# Patient Record
Sex: MALE | Race: WHITE | NOT HISPANIC OR LATINO | Employment: UNEMPLOYED | URBAN - METROPOLITAN AREA
[De-identification: names, ages, dates, MRNs, and addresses within clinical notes are randomized per-mention and may not be internally consistent; named-entity substitution may affect disease eponyms.]

---

## 2017-09-06 ENCOUNTER — HOSPITAL ENCOUNTER (INPATIENT)
Facility: MEDICAL CENTER | Age: 62
LOS: 5 days | DRG: 240 | End: 2017-09-11
Attending: EMERGENCY MEDICINE | Admitting: HOSPITALIST
Payer: MEDICAID

## 2017-09-06 ENCOUNTER — APPOINTMENT (OUTPATIENT)
Dept: RADIOLOGY | Facility: MEDICAL CENTER | Age: 62
DRG: 240 | End: 2017-09-06
Attending: EMERGENCY MEDICINE
Payer: MEDICAID

## 2017-09-06 ENCOUNTER — RESOLUTE PROFESSIONAL BILLING HOSPITAL PROF FEE (OUTPATIENT)
Dept: HOSPITALIST | Facility: MEDICAL CENTER | Age: 62
End: 2017-09-06
Payer: MEDICAID

## 2017-09-06 DIAGNOSIS — I96 GANGRENE OF FOOT (HCC): ICD-10-CM

## 2017-09-06 PROBLEM — L03.90 CELLULITIS: Status: ACTIVE | Noted: 2017-09-06

## 2017-09-06 LAB
ALBUMIN SERPL BCP-MCNC: 3.5 G/DL (ref 3.2–4.9)
ALBUMIN/GLOB SERPL: 0.7 G/DL
ALP SERPL-CCNC: 72 U/L (ref 30–99)
ALT SERPL-CCNC: 21 U/L (ref 2–50)
ANION GAP SERPL CALC-SCNC: 12 MMOL/L (ref 0–11.9)
AST SERPL-CCNC: 30 U/L (ref 12–45)
BASOPHILS # BLD AUTO: 0.2 % (ref 0–1.8)
BASOPHILS # BLD: 0.03 K/UL (ref 0–0.12)
BILIRUB SERPL-MCNC: 0.4 MG/DL (ref 0.1–1.5)
BUN SERPL-MCNC: 28 MG/DL (ref 8–22)
CALCIUM SERPL-MCNC: 9.9 MG/DL (ref 8.5–10.5)
CHLORIDE SERPL-SCNC: 89 MMOL/L (ref 96–112)
CO2 SERPL-SCNC: 25 MMOL/L (ref 20–33)
CREAT SERPL-MCNC: 1.04 MG/DL (ref 0.5–1.4)
CRP SERPL HS-MCNC: 22.85 MG/DL (ref 0–0.75)
EOSINOPHIL # BLD AUTO: 0.01 K/UL (ref 0–0.51)
EOSINOPHIL NFR BLD: 0.1 % (ref 0–6.9)
ERYTHROCYTE [DISTWIDTH] IN BLOOD BY AUTOMATED COUNT: 36.1 FL (ref 35.9–50)
ERYTHROCYTE [SEDIMENTATION RATE] IN BLOOD BY WESTERGREN METHOD: 36 MM/HOUR (ref 0–20)
GFR SERPL CREATININE-BSD FRML MDRD: >60 ML/MIN/1.73 M 2
GLOBULIN SER CALC-MCNC: 4.7 G/DL (ref 1.9–3.5)
GLUCOSE BLD-MCNC: 61 MG/DL (ref 65–99)
GLUCOSE BLD-MCNC: 70 MG/DL (ref 65–99)
GLUCOSE BLD-MCNC: 83 MG/DL (ref 65–99)
GLUCOSE SERPL-MCNC: 115 MG/DL (ref 65–99)
HCT VFR BLD AUTO: 34.3 % (ref 42–52)
HGB BLD-MCNC: 11.5 G/DL (ref 14–18)
IMM GRANULOCYTES # BLD AUTO: 0.14 K/UL (ref 0–0.11)
IMM GRANULOCYTES NFR BLD AUTO: 0.7 % (ref 0–0.9)
LACTATE BLD-SCNC: 1 MMOL/L (ref 0.5–2)
LACTATE BLD-SCNC: 1.3 MMOL/L (ref 0.5–2)
LYMPHOCYTES # BLD AUTO: 1.76 K/UL (ref 1–4.8)
LYMPHOCYTES NFR BLD: 9.2 % (ref 22–41)
MCH RBC QN AUTO: 26.8 PG (ref 27–33)
MCHC RBC AUTO-ENTMCNC: 33.5 G/DL (ref 33.7–35.3)
MCV RBC AUTO: 80 FL (ref 81.4–97.8)
MONOCYTES # BLD AUTO: 1.4 K/UL (ref 0–0.85)
MONOCYTES NFR BLD AUTO: 7.4 % (ref 0–13.4)
NEUTROPHILS # BLD AUTO: 15.69 K/UL (ref 1.82–7.42)
NEUTROPHILS NFR BLD: 82.4 % (ref 44–72)
NRBC # BLD AUTO: 0 K/UL
NRBC BLD AUTO-RTO: 0 /100 WBC
PLATELET # BLD AUTO: 378 K/UL (ref 164–446)
PMV BLD AUTO: 9.1 FL (ref 9–12.9)
POTASSIUM SERPL-SCNC: 4.3 MMOL/L (ref 3.6–5.5)
PREALB SERPL-MCNC: 9 MG/DL (ref 18–38)
PROT SERPL-MCNC: 8.2 G/DL (ref 6–8.2)
RBC # BLD AUTO: 4.29 M/UL (ref 4.7–6.1)
SODIUM SERPL-SCNC: 126 MMOL/L (ref 135–145)
SODIUM SERPL-SCNC: 128 MMOL/L (ref 135–145)
WBC # BLD AUTO: 19 K/UL (ref 4.8–10.8)

## 2017-09-06 PROCEDURE — 96365 THER/PROPH/DIAG IV INF INIT: CPT

## 2017-09-06 PROCEDURE — 700102 HCHG RX REV CODE 250 W/ 637 OVERRIDE(OP): Performed by: HOSPITALIST

## 2017-09-06 PROCEDURE — 770001 HCHG ROOM/CARE - MED/SURG/GYN PRIV*

## 2017-09-06 PROCEDURE — 700105 HCHG RX REV CODE 258: Performed by: EMERGENCY MEDICINE

## 2017-09-06 PROCEDURE — 700105 HCHG RX REV CODE 258

## 2017-09-06 PROCEDURE — 96366 THER/PROPH/DIAG IV INF ADDON: CPT

## 2017-09-06 PROCEDURE — 700111 HCHG RX REV CODE 636 W/ 250 OVERRIDE (IP): Performed by: HOSPITALIST

## 2017-09-06 PROCEDURE — 80053 COMPREHEN METABOLIC PANEL: CPT

## 2017-09-06 PROCEDURE — 700111 HCHG RX REV CODE 636 W/ 250 OVERRIDE (IP): Performed by: EMERGENCY MEDICINE

## 2017-09-06 PROCEDURE — 96361 HYDRATE IV INFUSION ADD-ON: CPT

## 2017-09-06 PROCEDURE — 83605 ASSAY OF LACTIC ACID: CPT

## 2017-09-06 PROCEDURE — 36415 COLL VENOUS BLD VENIPUNCTURE: CPT

## 2017-09-06 PROCEDURE — 96375 TX/PRO/DX INJ NEW DRUG ADDON: CPT

## 2017-09-06 PROCEDURE — 96368 THER/DIAG CONCURRENT INF: CPT

## 2017-09-06 PROCEDURE — 84295 ASSAY OF SERUM SODIUM: CPT

## 2017-09-06 PROCEDURE — 71010 DX-CHEST-PORTABLE (1 VIEW): CPT

## 2017-09-06 PROCEDURE — 99223 1ST HOSP IP/OBS HIGH 75: CPT | Performed by: HOSPITALIST

## 2017-09-06 PROCEDURE — 86140 C-REACTIVE PROTEIN: CPT

## 2017-09-06 PROCEDURE — 73630 X-RAY EXAM OF FOOT: CPT | Mod: LT

## 2017-09-06 PROCEDURE — 85025 COMPLETE CBC W/AUTO DIFF WBC: CPT

## 2017-09-06 PROCEDURE — 93926 LOWER EXTREMITY STUDY: CPT

## 2017-09-06 PROCEDURE — 83036 HEMOGLOBIN GLYCOSYLATED A1C: CPT

## 2017-09-06 PROCEDURE — 700111 HCHG RX REV CODE 636 W/ 250 OVERRIDE (IP)

## 2017-09-06 PROCEDURE — 93922 UPR/L XTREMITY ART 2 LEVELS: CPT

## 2017-09-06 PROCEDURE — 87040 BLOOD CULTURE FOR BACTERIA: CPT | Mod: 91

## 2017-09-06 PROCEDURE — 99285 EMERGENCY DEPT VISIT HI MDM: CPT

## 2017-09-06 PROCEDURE — 94760 N-INVAS EAR/PLS OXIMETRY 1: CPT

## 2017-09-06 PROCEDURE — 82962 GLUCOSE BLOOD TEST: CPT

## 2017-09-06 PROCEDURE — 85652 RBC SED RATE AUTOMATED: CPT

## 2017-09-06 PROCEDURE — 93926 LOWER EXTREMITY STUDY: CPT | Mod: 26 | Performed by: SURGERY

## 2017-09-06 PROCEDURE — 84134 ASSAY OF PREALBUMIN: CPT

## 2017-09-06 PROCEDURE — 700105 HCHG RX REV CODE 258: Performed by: HOSPITALIST

## 2017-09-06 PROCEDURE — A9270 NON-COVERED ITEM OR SERVICE: HCPCS | Performed by: HOSPITALIST

## 2017-09-06 PROCEDURE — 93922 UPR/L XTREMITY ART 2 LEVELS: CPT | Mod: 26 | Performed by: SURGERY

## 2017-09-06 RX ORDER — LOSARTAN POTASSIUM 50 MG/1
50 TABLET ORAL DAILY
Status: DISCONTINUED | OUTPATIENT
Start: 2017-09-07 | End: 2017-09-11 | Stop reason: HOSPADM

## 2017-09-06 RX ORDER — ACETAMINOPHEN 325 MG/1
650 TABLET ORAL EVERY 6 HOURS PRN
Status: DISCONTINUED | OUTPATIENT
Start: 2017-09-06 | End: 2017-09-11 | Stop reason: HOSPADM

## 2017-09-06 RX ORDER — LABETALOL HYDROCHLORIDE 5 MG/ML
10 INJECTION, SOLUTION INTRAVENOUS EVERY 4 HOURS PRN
Status: DISCONTINUED | OUTPATIENT
Start: 2017-09-06 | End: 2017-09-11 | Stop reason: HOSPADM

## 2017-09-06 RX ORDER — SPIRONOLACTONE 50 MG/1
50 TABLET, FILM COATED ORAL 2 TIMES DAILY
Status: DISCONTINUED | OUTPATIENT
Start: 2017-09-06 | End: 2017-09-11 | Stop reason: HOSPADM

## 2017-09-06 RX ORDER — OXYCODONE HYDROCHLORIDE 5 MG/1
15 TABLET ORAL EVERY 4 HOURS PRN
Status: DISCONTINUED | OUTPATIENT
Start: 2017-09-06 | End: 2017-09-11 | Stop reason: HOSPADM

## 2017-09-06 RX ORDER — PROMETHAZINE HYDROCHLORIDE 25 MG/1
12.5-25 SUPPOSITORY RECTAL EVERY 4 HOURS PRN
Status: DISCONTINUED | OUTPATIENT
Start: 2017-09-06 | End: 2017-09-11 | Stop reason: HOSPADM

## 2017-09-06 RX ORDER — LOSARTAN POTASSIUM 50 MG/1
50 TABLET ORAL DAILY
COMMUNITY

## 2017-09-06 RX ORDER — HYDRALAZINE HYDROCHLORIDE 50 MG/1
100 TABLET, FILM COATED ORAL 2 TIMES DAILY
Status: DISCONTINUED | OUTPATIENT
Start: 2017-09-06 | End: 2017-09-11 | Stop reason: HOSPADM

## 2017-09-06 RX ORDER — ONDANSETRON 4 MG/1
4 TABLET, ORALLY DISINTEGRATING ORAL EVERY 4 HOURS PRN
Status: DISCONTINUED | OUTPATIENT
Start: 2017-09-06 | End: 2017-09-11 | Stop reason: HOSPADM

## 2017-09-06 RX ORDER — AMPICILLIN AND SULBACTAM 2; 1 G/1; G/1
3 INJECTION, POWDER, FOR SOLUTION INTRAMUSCULAR; INTRAVENOUS ONCE
Status: COMPLETED | OUTPATIENT
Start: 2017-09-06 | End: 2017-09-06

## 2017-09-06 RX ORDER — OMEPRAZOLE 20 MG/1
20 CAPSULE, DELAYED RELEASE ORAL
Status: DISCONTINUED | OUTPATIENT
Start: 2017-09-06 | End: 2017-09-11 | Stop reason: HOSPADM

## 2017-09-06 RX ORDER — PROMETHAZINE HYDROCHLORIDE 25 MG/1
12.5-25 TABLET ORAL EVERY 4 HOURS PRN
Status: DISCONTINUED | OUTPATIENT
Start: 2017-09-06 | End: 2017-09-11 | Stop reason: HOSPADM

## 2017-09-06 RX ORDER — SODIUM CHLORIDE 9 MG/ML
INJECTION, SOLUTION INTRAVENOUS CONTINUOUS
Status: DISCONTINUED | OUTPATIENT
Start: 2017-09-06 | End: 2017-09-10

## 2017-09-06 RX ORDER — MORPHINE SULFATE 4 MG/ML
4 INJECTION, SOLUTION INTRAMUSCULAR; INTRAVENOUS ONCE
Status: COMPLETED | OUTPATIENT
Start: 2017-09-06 | End: 2017-09-06

## 2017-09-06 RX ORDER — ISOSORBIDE MONONITRATE 30 MG/1
30 TABLET, EXTENDED RELEASE ORAL 2 TIMES DAILY
COMMUNITY

## 2017-09-06 RX ORDER — ATENOLOL 100 MG/1
100 TABLET ORAL DAILY
Status: DISCONTINUED | OUTPATIENT
Start: 2017-09-06 | End: 2017-09-11 | Stop reason: HOSPADM

## 2017-09-06 RX ORDER — BISACODYL 10 MG
10 SUPPOSITORY, RECTAL RECTAL
Status: DISCONTINUED | OUTPATIENT
Start: 2017-09-06 | End: 2017-09-11 | Stop reason: HOSPADM

## 2017-09-06 RX ORDER — OXYCODONE HYDROCHLORIDE 5 MG/1
2.5 TABLET ORAL
Status: DISCONTINUED | OUTPATIENT
Start: 2017-09-06 | End: 2017-09-06

## 2017-09-06 RX ORDER — POLYETHYLENE GLYCOL 3350 17 G/17G
1 POWDER, FOR SOLUTION ORAL
Status: DISCONTINUED | OUTPATIENT
Start: 2017-09-06 | End: 2017-09-11 | Stop reason: HOSPADM

## 2017-09-06 RX ORDER — AMOXICILLIN 250 MG
2 CAPSULE ORAL 2 TIMES DAILY
Status: DISCONTINUED | OUTPATIENT
Start: 2017-09-06 | End: 2017-09-11 | Stop reason: HOSPADM

## 2017-09-06 RX ORDER — OXYCODONE HYDROCHLORIDE 5 MG/1
5 TABLET ORAL
Status: DISCONTINUED | OUTPATIENT
Start: 2017-09-06 | End: 2017-09-06

## 2017-09-06 RX ORDER — LOVASTATIN 20 MG/1
50 TABLET ORAL NIGHTLY
Status: DISCONTINUED | OUTPATIENT
Start: 2017-09-06 | End: 2017-09-11 | Stop reason: HOSPADM

## 2017-09-06 RX ORDER — SODIUM CHLORIDE 9 MG/ML
1000 INJECTION, SOLUTION INTRAVENOUS ONCE
Status: COMPLETED | OUTPATIENT
Start: 2017-09-06 | End: 2017-09-06

## 2017-09-06 RX ORDER — DULOXETIN HYDROCHLORIDE 30 MG/1
30 CAPSULE, DELAYED RELEASE ORAL DAILY
Status: ON HOLD | COMMUNITY
End: 2020-04-09

## 2017-09-06 RX ORDER — ISOSORBIDE MONONITRATE 30 MG/1
30 TABLET, EXTENDED RELEASE ORAL 2 TIMES DAILY
Status: DISCONTINUED | OUTPATIENT
Start: 2017-09-06 | End: 2017-09-11 | Stop reason: HOSPADM

## 2017-09-06 RX ORDER — OMEPRAZOLE 20 MG/1
20 TABLET, DELAYED RELEASE ORAL DAILY
Status: DISCONTINUED | OUTPATIENT
Start: 2017-09-06 | End: 2017-09-06

## 2017-09-06 RX ORDER — OXYCODONE HYDROCHLORIDE 15 MG/1
15 TABLET ORAL EVERY 4 HOURS PRN
COMMUNITY

## 2017-09-06 RX ORDER — ONDANSETRON 2 MG/ML
4 INJECTION INTRAMUSCULAR; INTRAVENOUS EVERY 4 HOURS PRN
Status: DISCONTINUED | OUTPATIENT
Start: 2017-09-06 | End: 2017-09-11 | Stop reason: HOSPADM

## 2017-09-06 RX ORDER — SODIUM CHLORIDE 9 MG/ML
500 INJECTION, SOLUTION INTRAVENOUS
Status: DISCONTINUED | OUTPATIENT
Start: 2017-09-06 | End: 2017-09-11 | Stop reason: HOSPADM

## 2017-09-06 RX ORDER — DEXTROSE MONOHYDRATE 25 G/50ML
25 INJECTION, SOLUTION INTRAVENOUS
Status: DISCONTINUED | OUTPATIENT
Start: 2017-09-06 | End: 2017-09-11 | Stop reason: HOSPADM

## 2017-09-06 RX ORDER — AMLODIPINE BESYLATE 10 MG/1
10 TABLET ORAL DAILY
Status: DISCONTINUED | OUTPATIENT
Start: 2017-09-06 | End: 2017-09-11 | Stop reason: HOSPADM

## 2017-09-06 RX ORDER — DULOXETIN HYDROCHLORIDE 30 MG/1
30 CAPSULE, DELAYED RELEASE ORAL DAILY
Status: DISCONTINUED | OUTPATIENT
Start: 2017-09-06 | End: 2017-09-11 | Stop reason: HOSPADM

## 2017-09-06 RX ADMIN — LOVASTATIN 50 MG: 20 TABLET ORAL at 21:11

## 2017-09-06 RX ADMIN — SODIUM CHLORIDE: 9 INJECTION, SOLUTION INTRAVENOUS at 18:29

## 2017-09-06 RX ADMIN — ISOSORBIDE MONONITRATE 30 MG: 30 TABLET, EXTENDED RELEASE ORAL at 21:15

## 2017-09-06 RX ADMIN — MORPHINE SULFATE 4 MG: 4 INJECTION INTRAVENOUS at 14:03

## 2017-09-06 RX ADMIN — OMEPRAZOLE 20 MG: 20 CAPSULE, DELAYED RELEASE ORAL at 21:12

## 2017-09-06 RX ADMIN — STANDARDIZED SENNA CONCENTRATE AND DOCUSATE SODIUM 2 TABLET: 8.6; 5 TABLET, FILM COATED ORAL at 21:11

## 2017-09-06 RX ADMIN — Medication 16 G: at 18:27

## 2017-09-06 RX ADMIN — SPIRONOLACTONE 50 MG: 50 TABLET ORAL at 21:14

## 2017-09-06 RX ADMIN — SODIUM CHLORIDE 1000 ML: 9 INJECTION, SOLUTION INTRAVENOUS at 13:02

## 2017-09-06 RX ADMIN — OXYCODONE HYDROCHLORIDE 15 MG: 5 TABLET ORAL at 15:11

## 2017-09-06 RX ADMIN — HYDRALAZINE HYDROCHLORIDE 100 MG: 50 TABLET, FILM COATED ORAL at 21:11

## 2017-09-06 RX ADMIN — AMPICILLIN SODIUM AND SULBACTAM SODIUM 3 G: 2; 1 INJECTION, POWDER, FOR SOLUTION INTRAMUSCULAR; INTRAVENOUS at 19:36

## 2017-09-06 RX ADMIN — AMPICILLIN SODIUM AND SULBACTAM SODIUM 3 G: 2; 1 INJECTION, POWDER, FOR SOLUTION INTRAMUSCULAR; INTRAVENOUS at 11:13

## 2017-09-06 RX ADMIN — VANCOMYCIN HYDROCHLORIDE 2500 MG: 100 INJECTION, POWDER, LYOPHILIZED, FOR SOLUTION INTRAVENOUS at 11:28

## 2017-09-06 RX ADMIN — VANCOMYCIN HYDROCHLORIDE 1500 MG: 100 INJECTION, POWDER, LYOPHILIZED, FOR SOLUTION INTRAVENOUS at 21:16

## 2017-09-06 RX ADMIN — OXYCODONE HYDROCHLORIDE 15 MG: 5 TABLET ORAL at 19:45

## 2017-09-06 ASSESSMENT — ENCOUNTER SYMPTOMS
SHORTNESS OF BREATH: 0
VOMITING: 0
CHILLS: 0
NAUSEA: 0
DIARRHEA: 0
FEVER: 0
COUGH: 0
DIZZINESS: 0
SENSORY CHANGE: 0
CONSTIPATION: 0

## 2017-09-06 ASSESSMENT — PATIENT HEALTH QUESTIONNAIRE - PHQ9
SUM OF ALL RESPONSES TO PHQ9 QUESTIONS 1 AND 2: 1
4. FEELING TIRED OR HAVING LITTLE ENERGY: SEVERAL DAYS
SUM OF ALL RESPONSES TO PHQ QUESTIONS 1-9: 3
9. THOUGHTS THAT YOU WOULD BE BETTER OFF DEAD, OR OF HURTING YOURSELF: NOT AT ALL
3. TROUBLE FALLING OR STAYING ASLEEP OR SLEEPING TOO MUCH: NOT AT ALL
5. POOR APPETITE OR OVEREATING: NOT AT ALL
8. MOVING OR SPEAKING SO SLOWLY THAT OTHER PEOPLE COULD HAVE NOTICED. OR THE OPPOSITE, BEING SO FIGETY OR RESTLESS THAT YOU HAVE BEEN MOVING AROUND A LOT MORE THAN USUAL: SEVERAL DAYS
2. FEELING DOWN, DEPRESSED, IRRITABLE, OR HOPELESS: NOT AT ALL
6. FEELING BAD ABOUT YOURSELF - OR THAT YOU ARE A FAILURE OR HAVE LET YOURSELF OR YOUR FAMILY DOWN: NOT AL ALL
1. LITTLE INTEREST OR PLEASURE IN DOING THINGS: SEVERAL DAYS
7. TROUBLE CONCENTRATING ON THINGS, SUCH AS READING THE NEWSPAPER OR WATCHING TELEVISION: NOT AT ALL

## 2017-09-06 ASSESSMENT — LIFESTYLE VARIABLES
EVER_SMOKED: NEVER
ALCOHOL_USE: NO
DO YOU DRINK ALCOHOL: NO

## 2017-09-06 ASSESSMENT — PAIN SCALES - GENERAL
PAINLEVEL_OUTOF10: 9
PAINLEVEL_OUTOF10: 0

## 2017-09-06 NOTE — ED PROVIDER NOTES
ED Provider Note    CHIEF COMPLAINT  Chief Complaint   Patient presents with   • Foot Swelling     Possible foot infection       HPI  Seven Pavan is a 62 y.o. male who presents to the emergency department with foot swelling and an ulcer. 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 His foot has gotten worse. It is now red and swollen. He has neuropathy and denies having pain. He has had no odor. According to outside physician and the redness and swelling is much worse today. Nothing in particular brought this on. Denies fevers or chills.    REVIEW OF SYSTEMS  As per HPI, otherwise a 10 point review of systems is negative    PAST MEDICAL HISTORY  Past Medical History:   Diagnosis Date   • CVA (cerebral infarction) 2011   • Diabetes (CMS-HCC)    • Dysphagia    • HTN (hypertension)    • Obesity        SOCIAL HISTORY  Social History   Substance Use Topics   • Smoking status: Never Smoker   • Smokeless tobacco: Not on file   • Alcohol use No       SURGICAL HISTORY  Past Surgical History:   Procedure Laterality Date   • VITRECTOMY POSTERIOR Left 1/5/2016    Procedure: VITRECTOMY POSTERIOR membrane dissection laser ;  Surgeon: Parmjit Newell M.D.;  Location: SURGERY SAME DAY [facility];  Service:        CURRENT MEDICATIONS  Home Medications     Reviewed by Tyrell Barlow R.N. (Registered Nurse) on 09/06/17 at 1058  Med List Status: Not Addressed   Medication Last Dose Status   amlodipine (NORVASC) 5 MG Tab 1/4/2016 Active   atenolol (TENORMIN) 100 MG Tab 1/4/2016 Active   Dextromethorphan-Quinidine (NUEDEXTA PO) 1/4/2016 Active   dipyridamole-aspirin (AGGRENOX)  MG CAPSULE SR 12 HR 1/4/2016 Active   furosemide (LASIX) 40 MG Tab 1/4/2016 Active   glipiZIDE (GLUCOTROL) 5 MG Tab 1/4/2016 Active   hydrALAZINE (APRESOLINE) 50 MG Tab 1/4/2016 Active   isosorbide dinitrate (ISORDIL) 30 MG Tab 1/4/2016 Active   lisinopril (PRINIVIL, ZESTRIL) 40 MG tablet  "1/4/2016 Active   lovastatin (MEVACOR) 40 MG tablet 1/4/2016 Active   metformin (GLUCOPHAGE) 1000 MG tablet 1/4/2016 Active   omeprazole (PRILOSEC OTC) 20 MG tablet 1/4/2016 Active   spironolactone (ALDACTONE) 50 MG Tab 1/4/2016 Active                ALLERGIES  No Known Allergies    PHYSICAL EXAM  VITAL SIGNS: /72   Pulse 69   Temp 36.2 °C (97.2 °F)   Resp 18   Ht 1.702 m (5' 7\")   Wt 99.8 kg (220 lb)   BMI 34.46 kg/m²    Constitutional: Awake and alert  HENT:  Atraumatic, Normocephalic.Oropharynx moist mucus membranes, Nose normal inspection.   Eyes: Normal inspection  Neck: Supple  Cardiovascular: Normal heart rate, Normal rhythm.    Thorax & Lungs: No respiratory distress, No wheezing, No rales, No rhonchi, No chest tenderness.   Abdomen: Bowel sounds normal, soft, non-distended, nontender, no mass  Skin: See extremity exam below  Extremities: Obvious gangrene of the left 1st and 4th toes. There is redness and swelling over the dorsal foot. Chronic vascular skin changes bilateral lower extremities. I cannot palpate dorsalis pedis or posterior tibial pulse.  Neurologic: Dysarthria, left hemiparesis from prior stroke    RADIOLOGY/PROCEDURES  LE ART DUPLX/IMAG (Specify in Comments Left, Right Or Bilateral)   Final Result      LE ART/CORDELIA (Specify in Comments Left, Right Or Bilateral)   Final Result      DX-FOOT-COMPLETE 3+ LEFT   Final Result      1.  There is diffuse swelling with gas in the plantar soft tissues in the distal metatarsal regions. There is no periostitis to suggest osteomyelitis.   2.  There is extensive atherosclerotic change throughout the vessels of the foot and ankle.   3.  There is degenerative change as described above.      DX-CHEST-PORTABLE (1 VIEW)   Final Result      1.  There is mild peribronchial wall thickening suggesting possible vascular congestion or edema.   2.  Right hilar opacity is likely due to atelectasis.       Imaging is interpreted by radiologist    Labs:  Results " for orders placed or performed during the hospital encounter of 09/06/17   LACTIC ACID   Result Value Ref Range    Lactic Acid 1.3 0.5 - 2.0 mmol/L   CBC WITH DIFFERENTIAL   Result Value Ref Range    WBC 19.0 (H) 4.8 - 10.8 K/uL    RBC 4.29 (L) 4.70 - 6.10 M/uL    Hemoglobin 11.5 (L) 14.0 - 18.0 g/dL    Hematocrit 34.3 (L) 42.0 - 52.0 %    MCV 80.0 (L) 81.4 - 97.8 fL    MCH 26.8 (L) 27.0 - 33.0 pg    MCHC 33.5 (L) 33.7 - 35.3 g/dL    RDW 36.1 35.9 - 50.0 fL    Platelet Count 378 164 - 446 K/uL    MPV 9.1 9.0 - 12.9 fL    Neutrophils-Polys 82.40 (H) 44.00 - 72.00 %    Lymphocytes 9.20 (L) 22.00 - 41.00 %    Monocytes 7.40 0.00 - 13.40 %    Eosinophils 0.10 0.00 - 6.90 %    Basophils 0.20 0.00 - 1.80 %    Immature Granulocytes 0.70 0.00 - 0.90 %    Nucleated RBC 0.00 /100 WBC    Neutrophils (Absolute) 15.69 (H) 1.82 - 7.42 K/uL    Lymphs (Absolute) 1.76 1.00 - 4.80 K/uL    Monos (Absolute) 1.40 (H) 0.00 - 0.85 K/uL    Eos (Absolute) 0.01 0.00 - 0.51 K/uL    Baso (Absolute) 0.03 0.00 - 0.12 K/uL    Immature Granulocytes (abs) 0.14 (H) 0.00 - 0.11 K/uL    NRBC (Absolute) 0.00 K/uL   COMP METABOLIC PANEL   Result Value Ref Range    Sodium 126 (L) 135 - 145 mmol/L    Potassium 4.3 3.6 - 5.5 mmol/L    Chloride 89 (L) 96 - 112 mmol/L    Co2 25 20 - 33 mmol/L    Anion Gap 12.0 (H) 0.0 - 11.9    Glucose 115 (H) 65 - 99 mg/dL    Bun 28 (H) 8 - 22 mg/dL    Creatinine 1.04 0.50 - 1.40 mg/dL    Calcium 9.9 8.5 - 10.5 mg/dL    AST(SGOT) 30 12 - 45 U/L    ALT(SGPT) 21 2 - 50 U/L    Alkaline Phosphatase 72 30 - 99 U/L    Total Bilirubin 0.4 0.1 - 1.5 mg/dL    Albumin 3.5 3.2 - 4.9 g/dL    Total Protein 8.2 6.0 - 8.2 g/dL    Globulin 4.7 (H) 1.9 - 3.5 g/dL    A-G Ratio 0.7 g/dL   ESTIMATED GFR   Result Value Ref Range    GFR If African American >60 >60 mL/min/1.73 m 2    GFR If Non African American >60 >60 mL/min/1.73 m 2         COURSE & MEDICAL DECISION MAKING  Patient presents with gangrenous toes with evidence of infection over  the foot. IV was established. Given IV vancomycin and Unasyn. Obtained laboratory data and x-rays with results as noted above. Started saline infusion for hyponatremia. Patient's vital signs are stable. Patient will be admitted to the hospital for further treatment. Hospitalist was consulted for admission.  consultu Dr. Whitley, orthopedics    FINAL IMPRESSION  1. Cellulitis of the foot  2. Gangrene of left toes #1 and 4      This dictation was created using voice recognition software. The accuracy of the dictation is limited to the abilities of the software.  The nursing notes were reviewed and certain aspects of this information were incorporated into this note.      Electronically signed by: Tyrell Griffin, 9/6/2017 11:10 AM

## 2017-09-06 NOTE — ED NOTES
Patient updated on POC. Complained of 6/10 pain in left foot. ERP notified and patient medicated for pain

## 2017-09-06 NOTE — ED NOTES
Patient transferred from [facility]. Patient transferred for infection in left foot and big toe. Infection has lasted approximately 3 months. ERP at bedside for eval

## 2017-09-06 NOTE — ED NOTES
Patient repositioned in bed and updated on POC. Patient o2 sats were dropping to 88-90 based off positioning in bed. Patient was placed on 2l of 02 via NC.

## 2017-09-06 NOTE — PROGRESS NOTES
LIMB PRESERVATION SERVICE      DATE OF CONSULTATION: 9/6/2017    REASON FOR CONSULT: Gangrenous toes of left foot    HISTORY OF PRESENT ILLNESS: 62-year-old male, prisoner from [facility], seen in ED for gangrenous left 4th toe and left great toe.  The wound on the 4th toe started as a blister about 3 weeks ago.  The great toe wound started after he was given a new pair of shoes recently. He is nonambulatory due to a series of CVAs.  He uses a wheelchair for mobility.  He smoked for many years prior to his incarceration.  He is diabetic, though he does not know what his blood sugars normally run.  An xray of his foot was done and shows diffuse swelling with gas in the plantar soft tissues, no osseous destruction.  Arterial studies have also been done and shows left lower extremity arterial insufficiency.  He denies fevers, chills, nausea or vomiting. WBC 19; Glucose 115.   IV vancomycin and Unasyn started on admission.    PAST MEDICAL HISTORY:   Past Medical History:   Diagnosis Date   • CVA (cerebral infarction) 2011   • Diabetes (CMS-HCC)    • Dysphagia    • HTN (hypertension)    • Obesity         MEDICATIONS:   Current Facility-Administered Medications   Medication Dose   • amlodipine (NORVASC) tablet 10 mg  10 mg   • atenolol (TENORMIN) tablet 100 mg  100 mg   • duloxetine (CYMBALTA) capsule 30 mg  30 mg   • hydrALAZINE (APRESOLINE) tablet 100 mg  100 mg   • isosorbide mononitrate SR (IMDUR) tablet 30 mg  30 mg   • [START ON 9/7/2017] losartan (COZAAR) tablet 50 mg  50 mg   • lovastatin (MEVACOR) tablet 50 mg  50 mg   • oxycodone immediate-release (ROXICODONE) tablet 15 mg  15 mg   • spironolactone (ALDACTONE) tablet 50 mg  50 mg   • senna-docusate (PERICOLACE or SENOKOT S) 8.6-50 MG per tablet 2 Tab  2 Tab    And   • polyethylene glycol/lytes (MIRALAX) PACKET 1 Packet  1 Packet    And   • magnesium hydroxide (MILK OF MAGNESIA) suspension 30 mL  30 mL    And   • bisacodyl (DULCOLAX) suppository 10 mg  10 mg   • NS  infusion     • NS (BOLUS) infusion 500 mL  500 mL   • acetaminophen (TYLENOL) tablet 650 mg  650 mg   • Pharmacy Consult Request ...Pain Management Review      And   • HYDROmorphone (DILAUDID) injection 0.25 mg  0.25 mg   • ampicillin/sulbactam (UNASYN) 3 g in  mL IVPB  3 g   • MD ALERT... vancomycin per pharmacy protocol 1 Each  1 Each   • labetalol (NORMODYNE,TRANDATE) injection 10 mg  10 mg   • insulin lispro (HUMALOG) injection 2-9 Units  2-9 Units   • glucose 4 g chewable tablet 16 g  16 g    And   • dextrose 50% (D50W) injection 25 mL  25 mL   • ondansetron (ZOFRAN) syringe/vial injection 4 mg  4 mg   • ondansetron (ZOFRAN ODT) dispertab 4 mg  4 mg   • promethazine (PHENERGAN) tablet 12.5-25 mg  12.5-25 mg   • promethazine (PHENERGAN) suppository 12.5-25 mg  12.5-25 mg   • prochlorperazine (COMPAZINE) injection 5-10 mg  5-10 mg   • omeprazole (PRILOSEC) capsule 20 mg  20 mg     Current Outpatient Prescriptions   Medication   • losartan (COZAAR) 50 MG Tab   • Dextromethorphan-Quinidine (NUEDEXTA) 20-10 MG Cap   • isosorbide mononitrate SR (IMDUR) 30 MG TABLET SR 24 HR   • duloxetine (CYMBALTA) 30 MG Cap DR Particles   • oxycodone (OXY-IR) 15 MG immediate release tablet   • furosemide (LASIX) 40 MG Tab   • glipiZIDE (GLUCOTROL) 5 MG Tab   • dipyridamole-aspirin (AGGRENOX)  MG CAPSULE SR 12 HR   • lovastatin (MEVACOR) 40 MG tablet   • omeprazole (PRILOSEC OTC) 20 MG tablet   • spironolactone (ALDACTONE) 50 MG Tab   • hydrALAZINE (APRESOLINE) 50 MG Tab   • amlodipine (NORVASC) 5 MG Tab   • atenolol (TENORMIN) 100 MG Tab   • metformin (GLUCOPHAGE) 1000 MG tablet       ALLERGIES:  No Known Allergies     PAST SURGICAL HISTORY:   Past Surgical History:   Procedure Laterality Date   • VITRECTOMY POSTERIOR Left 1/5/2016    Procedure: VITRECTOMY POSTERIOR membrane dissection laser ;  Surgeon: Parmjit Newell M.D.;  Location: SURGERY SAME DAY [facility];  Service:        SOCIAL HISTORY:   Social  "History     Social History   • Marital status: Single     Spouse name: N/A   • Number of children: N/A   • Years of education: N/A     Social History Main Topics   • Smoking status: Never Smoker   • Smokeless tobacco: Not on file   • Alcohol use No   • Drug use: No   • Sexual activity: Not on file     Other Topics Concern   • Not on file     Social History Narrative   • No narrative on file        FAMILY HISTORY: No family history on file.    REVIEW OF SYSTEMS:   Review of Systems   Constitutional: Negative for chills and fever.   Respiratory: Negative for cough and shortness of breath.    Cardiovascular: Negative for chest pain and leg swelling.   Gastrointestinal: Negative for constipation, diarrhea, nausea and vomiting.   Musculoskeletal:        Left leg painful, foot swollen for several days   Neurological: Negative for dizziness and sensory change.       PHYSICAL EXAMINATION:   VITAL SIGNS: Blood pressure 111/72, pulse (!) 58, temperature 36.2 °C (97.2 °F), resp. rate 18, height 1.702 m (5' 7\"), weight 99.8 kg (220 lb), SpO2 90 %.  Physical Exam   Constitutional: He is well-developed, well-nourished, and in no distress.   HENT:   Head: Normocephalic.   Eyes: Pupils are equal, round, and reactive to light.   Cardiovascular:   Unable to palpate Left pedal pulses  Foot cool to touch       Pulmonary/Chest: Effort normal.   Musculoskeletal: He exhibits edema.   Decreased ROM of LLE  Left foot edematous   Neurological:   Drowsy, falling asleep during conversation   Skin: Skin is warm.   Gangrenous left great toe and left 4th toe, foul odor noted  Swelling and fluctuance to left plantar foot   Psychiatric: Affect normal.         DIAGNOSTIC DATA:   Recent Labs      09/06/17   1058   WBC  19.0*   RBC  4.29*   HEMOGLOBIN  11.5*   HEMATOCRIT  34.3*   MCV  80.0*   MCH  26.8*   MCHC  33.5*   RDW  36.1   PLATELETCT  378   MPV  9.1     Recent Labs      09/06/17   1058   SODIUM  126*   POTASSIUM  4.3   CHLORIDE  89*   CO2  25 "   GLUCOSE  115*   BUN  28*                               Results for orders placed during the hospital encounter of 09/06/17   DX-FOOT-COMPLETE 3+ LEFT    Impression 1.  There is diffuse swelling with gas in the plantar soft tissues in the distal metatarsal regions. There is no periostitis to suggest osteomyelitis.  2.  There is extensive atherosclerotic change throughout the vessels of the foot and ankle.  3.  There is degenerative change as described above.                                                                     VASCULAR STUDIES  LLE arterial duplex: CONCLUSIONS   1.  Monophasic left left artery waveforms lend suspicion for possible left    iliac artery stenosis.   2.  Heavy calcification in the left superficial femoral artery with distal    occlusion.    3.  Possible left lower extremity tibial artery occlusion with distal    reconstitution of the anterior tibial and dorsal pedal artery.      ASSESSMENT AND PLAN:     1. Gangrene of toes- Gangrene of left great toe and left 4th toes d/t LLE arterial insufficiency.  Vascular to consult, Dr. Osei contacted.     2. Infection- IV abx started on admission.  WBC 19.  Afebrile.     3. Diabetes mellitus type 2- Glucose 115 on admission. A1c ordered

## 2017-09-07 PROBLEM — I10 HTN (HYPERTENSION): Status: ACTIVE | Noted: 2017-09-07

## 2017-09-07 PROBLEM — E87.1 HYPONATREMIA: Status: ACTIVE | Noted: 2017-09-07

## 2017-09-07 PROBLEM — D64.9 ANEMIA: Status: ACTIVE | Noted: 2017-09-07

## 2017-09-07 PROBLEM — L97.509 DIABETIC FOOT ULCER (HCC): Status: ACTIVE | Noted: 2017-09-07

## 2017-09-07 PROBLEM — I96 GANGRENE OF FOOT (HCC): Status: ACTIVE | Noted: 2017-09-07

## 2017-09-07 PROBLEM — I73.9 PAD (PERIPHERAL ARTERY DISEASE) (HCC): Status: ACTIVE | Noted: 2017-09-07

## 2017-09-07 PROBLEM — E78.5 DYSLIPIDEMIA: Status: ACTIVE | Noted: 2017-09-07

## 2017-09-07 PROBLEM — E11.621 DIABETIC FOOT ULCER (HCC): Status: ACTIVE | Noted: 2017-09-07

## 2017-09-07 PROBLEM — E11.9 DM (DIABETES MELLITUS) (HCC): Status: ACTIVE | Noted: 2017-09-07

## 2017-09-07 PROBLEM — Z86.73 HISTORY OF STROKE: Status: ACTIVE | Noted: 2017-09-07

## 2017-09-07 LAB
ANION GAP SERPL CALC-SCNC: 9 MMOL/L (ref 0–11.9)
BASOPHILS # BLD AUTO: 0.3 % (ref 0–1.8)
BASOPHILS # BLD: 0.04 K/UL (ref 0–0.12)
BUN SERPL-MCNC: 16 MG/DL (ref 8–22)
CALCIUM SERPL-MCNC: 8.9 MG/DL (ref 8.5–10.5)
CHLORIDE SERPL-SCNC: 95 MMOL/L (ref 96–112)
CO2 SERPL-SCNC: 24 MMOL/L (ref 20–33)
CREAT SERPL-MCNC: 0.6 MG/DL (ref 0.5–1.4)
EKG IMPRESSION: NORMAL
EOSINOPHIL # BLD AUTO: 0.04 K/UL (ref 0–0.51)
EOSINOPHIL NFR BLD: 0.3 % (ref 0–6.9)
ERYTHROCYTE [DISTWIDTH] IN BLOOD BY AUTOMATED COUNT: 36.4 FL (ref 35.9–50)
GFR SERPL CREATININE-BSD FRML MDRD: >60 ML/MIN/1.73 M 2
GLUCOSE BLD-MCNC: 108 MG/DL (ref 65–99)
GLUCOSE BLD-MCNC: 115 MG/DL (ref 65–99)
GLUCOSE BLD-MCNC: 163 MG/DL (ref 65–99)
GLUCOSE BLD-MCNC: 216 MG/DL (ref 65–99)
GLUCOSE BLD-MCNC: 226 MG/DL (ref 65–99)
GLUCOSE SERPL-MCNC: 95 MG/DL (ref 65–99)
HCT VFR BLD AUTO: 28.6 % (ref 42–52)
HGB BLD-MCNC: 9.4 G/DL (ref 14–18)
IMM GRANULOCYTES # BLD AUTO: 0.1 K/UL (ref 0–0.11)
IMM GRANULOCYTES NFR BLD AUTO: 0.7 % (ref 0–0.9)
LYMPHOCYTES # BLD AUTO: 1.43 K/UL (ref 1–4.8)
LYMPHOCYTES NFR BLD: 9.9 % (ref 22–41)
MCH RBC QN AUTO: 26.3 PG (ref 27–33)
MCHC RBC AUTO-ENTMCNC: 32.9 G/DL (ref 33.7–35.3)
MCV RBC AUTO: 80.1 FL (ref 81.4–97.8)
MONOCYTES # BLD AUTO: 1.05 K/UL (ref 0–0.85)
MONOCYTES NFR BLD AUTO: 7.3 % (ref 0–13.4)
NEUTROPHILS # BLD AUTO: 11.81 K/UL (ref 1.82–7.42)
NEUTROPHILS NFR BLD: 81.5 % (ref 44–72)
NRBC # BLD AUTO: 0 K/UL
NRBC BLD AUTO-RTO: 0 /100 WBC
PLATELET # BLD AUTO: 273 K/UL (ref 164–446)
PMV BLD AUTO: 9.2 FL (ref 9–12.9)
POTASSIUM SERPL-SCNC: 4 MMOL/L (ref 3.6–5.5)
RBC # BLD AUTO: 3.57 M/UL (ref 4.7–6.1)
SODIUM SERPL-SCNC: 128 MMOL/L (ref 135–145)
VANCOMYCIN TROUGH SERPL-MCNC: 20.3 UG/ML (ref 10–20)
WBC # BLD AUTO: 14.5 K/UL (ref 4.8–10.8)

## 2017-09-07 PROCEDURE — 82962 GLUCOSE BLOOD TEST: CPT | Mod: 91

## 2017-09-07 PROCEDURE — 700111 HCHG RX REV CODE 636 W/ 250 OVERRIDE (IP)

## 2017-09-07 PROCEDURE — 700101 HCHG RX REV CODE 250

## 2017-09-07 PROCEDURE — 700105 HCHG RX REV CODE 258

## 2017-09-07 PROCEDURE — G8997 SWALLOW GOAL STATUS: HCPCS | Mod: CI

## 2017-09-07 PROCEDURE — 160048 HCHG OR STATISTICAL LEVEL 1-5: Performed by: SURGERY

## 2017-09-07 PROCEDURE — 500088 HCHG BLADE, SAGITTAL: Performed by: SURGERY

## 2017-09-07 PROCEDURE — 501838 HCHG SUTURE GENERAL: Performed by: SURGERY

## 2017-09-07 PROCEDURE — 88307 TISSUE EXAM BY PATHOLOGIST: CPT

## 2017-09-07 PROCEDURE — 500881 HCHG PACK, EXTREMITY: Performed by: SURGERY

## 2017-09-07 PROCEDURE — 700102 HCHG RX REV CODE 250 W/ 637 OVERRIDE(OP)

## 2017-09-07 PROCEDURE — 92610 EVALUATE SWALLOWING FUNCTION: CPT

## 2017-09-07 PROCEDURE — A9270 NON-COVERED ITEM OR SERVICE: HCPCS

## 2017-09-07 PROCEDURE — 770001 HCHG ROOM/CARE - MED/SURG/GYN PRIV*

## 2017-09-07 PROCEDURE — 700105 HCHG RX REV CODE 258: Performed by: HOSPITALIST

## 2017-09-07 PROCEDURE — 160036 HCHG PACU - EA ADDL 30 MINS PHASE I: Performed by: SURGERY

## 2017-09-07 PROCEDURE — 700111 HCHG RX REV CODE 636 W/ 250 OVERRIDE (IP): Performed by: NURSE PRACTITIONER

## 2017-09-07 PROCEDURE — 160002 HCHG RECOVERY MINUTES (STAT): Performed by: SURGERY

## 2017-09-07 PROCEDURE — 500053 HCHG BANDAGE, ELASTIC 4: Performed by: SURGERY

## 2017-09-07 PROCEDURE — 700102 HCHG RX REV CODE 250 W/ 637 OVERRIDE(OP): Performed by: HOSPITALIST

## 2017-09-07 PROCEDURE — 80202 ASSAY OF VANCOMYCIN: CPT

## 2017-09-07 PROCEDURE — 160035 HCHG PACU - 1ST 60 MINS PHASE I: Performed by: SURGERY

## 2017-09-07 PROCEDURE — 36415 COLL VENOUS BLD VENIPUNCTURE: CPT

## 2017-09-07 PROCEDURE — 0Y6D0Z1 DETACHMENT AT LEFT UPPER LEG, HIGH, OPEN APPROACH: ICD-10-PCS | Performed by: SURGERY

## 2017-09-07 PROCEDURE — 80048 BASIC METABOLIC PNL TOTAL CA: CPT

## 2017-09-07 PROCEDURE — 160009 HCHG ANES TIME/MIN: Performed by: SURGERY

## 2017-09-07 PROCEDURE — 501445 HCHG STAPLER, SKIN DISP: Performed by: SURGERY

## 2017-09-07 PROCEDURE — 500697 HCHG HEMOCLIP, LARGE (ORANGE): Performed by: SURGERY

## 2017-09-07 PROCEDURE — 99233 SBSQ HOSP IP/OBS HIGH 50: CPT | Performed by: FAMILY MEDICINE

## 2017-09-07 PROCEDURE — 160028 HCHG SURGERY MINUTES - 1ST 30 MINS LEVEL 3: Performed by: SURGERY

## 2017-09-07 PROCEDURE — G8996 SWALLOW CURRENT STATUS: HCPCS | Mod: CJ

## 2017-09-07 PROCEDURE — 160039 HCHG SURGERY MINUTES - EA ADDL 1 MIN LEVEL 3: Performed by: SURGERY

## 2017-09-07 PROCEDURE — 88311 DECALCIFY TISSUE: CPT

## 2017-09-07 PROCEDURE — A9270 NON-COVERED ITEM OR SERVICE: HCPCS | Performed by: HOSPITALIST

## 2017-09-07 PROCEDURE — 93005 ELECTROCARDIOGRAM TRACING: CPT | Performed by: FAMILY MEDICINE

## 2017-09-07 PROCEDURE — 93010 ELECTROCARDIOGRAM REPORT: CPT | Performed by: INTERNAL MEDICINE

## 2017-09-07 PROCEDURE — 700111 HCHG RX REV CODE 636 W/ 250 OVERRIDE (IP): Performed by: HOSPITALIST

## 2017-09-07 PROCEDURE — 85025 COMPLETE CBC W/AUTO DIFF WBC: CPT

## 2017-09-07 RX ORDER — SODIUM CHLORIDE 9 MG/ML
INJECTION, SOLUTION INTRAVENOUS
Status: COMPLETED
Start: 2017-09-07 | End: 2017-09-07

## 2017-09-07 RX ORDER — OXYCODONE HYDROCHLORIDE AND ACETAMINOPHEN 5; 325 MG/1; MG/1
TABLET ORAL
Status: COMPLETED
Start: 2017-09-07 | End: 2017-09-07

## 2017-09-07 RX ORDER — MAGNESIUM HYDROXIDE 1200 MG/15ML
LIQUID ORAL
Status: DISCONTINUED | OUTPATIENT
Start: 2017-09-07 | End: 2017-09-07 | Stop reason: HOSPADM

## 2017-09-07 RX ADMIN — STANDARDIZED SENNA CONCENTRATE AND DOCUSATE SODIUM 2 TABLET: 8.6; 5 TABLET, FILM COATED ORAL at 21:25

## 2017-09-07 RX ADMIN — INSULIN LISPRO 3 UNITS: 100 INJECTION, SOLUTION INTRAVENOUS; SUBCUTANEOUS at 21:23

## 2017-09-07 RX ADMIN — VANCOMYCIN HYDROCHLORIDE 1500 MG: 100 INJECTION, POWDER, LYOPHILIZED, FOR SOLUTION INTRAVENOUS at 12:33

## 2017-09-07 RX ADMIN — AMPICILLIN SODIUM AND SULBACTAM SODIUM 3 G: 2; 1 INJECTION, POWDER, FOR SOLUTION INTRAMUSCULAR; INTRAVENOUS at 21:25

## 2017-09-07 RX ADMIN — RACEPINEPHRINE HYDROCHLORIDE: 11.25 SOLUTION RESPIRATORY (INHALATION) at 11:25

## 2017-09-07 RX ADMIN — HYDRALAZINE HYDROCHLORIDE 100 MG: 50 TABLET, FILM COATED ORAL at 21:25

## 2017-09-07 RX ADMIN — SPIRONOLACTONE 50 MG: 50 TABLET ORAL at 21:25

## 2017-09-07 RX ADMIN — ISOSORBIDE MONONITRATE 30 MG: 30 TABLET, EXTENDED RELEASE ORAL at 08:21

## 2017-09-07 RX ADMIN — HYDROMORPHONE HYDROCHLORIDE 0.25 MG: 1 INJECTION, SOLUTION INTRAMUSCULAR; INTRAVENOUS; SUBCUTANEOUS at 06:09

## 2017-09-07 RX ADMIN — INSULIN LISPRO 3 UNITS: 100 INJECTION, SOLUTION INTRAVENOUS; SUBCUTANEOUS at 16:49

## 2017-09-07 RX ADMIN — AMLODIPINE BESYLATE 10 MG: 10 TABLET ORAL at 08:19

## 2017-09-07 RX ADMIN — STANDARDIZED SENNA CONCENTRATE AND DOCUSATE SODIUM 2 TABLET: 8.6; 5 TABLET, FILM COATED ORAL at 08:20

## 2017-09-07 RX ADMIN — LOSARTAN POTASSIUM 50 MG: 50 TABLET, FILM COATED ORAL at 08:18

## 2017-09-07 RX ADMIN — HYDROMORPHONE HYDROCHLORIDE 0.5 MG: 1 INJECTION, SOLUTION INTRAMUSCULAR; INTRAVENOUS; SUBCUTANEOUS at 22:13

## 2017-09-07 RX ADMIN — SPIRONOLACTONE 50 MG: 50 TABLET ORAL at 08:20

## 2017-09-07 RX ADMIN — ISOSORBIDE MONONITRATE 30 MG: 30 TABLET, EXTENDED RELEASE ORAL at 21:25

## 2017-09-07 RX ADMIN — AMPICILLIN SODIUM AND SULBACTAM SODIUM 3 G: 2; 1 INJECTION, POWDER, FOR SOLUTION INTRAMUSCULAR; INTRAVENOUS at 00:29

## 2017-09-07 RX ADMIN — DULOXETINE HYDROCHLORIDE 30 MG: 30 CAPSULE, DELAYED RELEASE ORAL at 08:18

## 2017-09-07 RX ADMIN — AMPICILLIN SODIUM AND SULBACTAM SODIUM 3 G: 2; 1 INJECTION, POWDER, FOR SOLUTION INTRAMUSCULAR; INTRAVENOUS at 06:09

## 2017-09-07 RX ADMIN — HYDROMORPHONE HYDROCHLORIDE 0.25 MG: 1 INJECTION, SOLUTION INTRAMUSCULAR; INTRAVENOUS; SUBCUTANEOUS at 09:05

## 2017-09-07 RX ADMIN — HYDROMORPHONE HYDROCHLORIDE 0.25 MG: 1 INJECTION, SOLUTION INTRAMUSCULAR; INTRAVENOUS; SUBCUTANEOUS at 02:45

## 2017-09-07 RX ADMIN — OXYCODONE HYDROCHLORIDE 15 MG: 5 TABLET ORAL at 03:49

## 2017-09-07 RX ADMIN — HYDROMORPHONE HYDROCHLORIDE 0.25 MG: 1 INJECTION, SOLUTION INTRAMUSCULAR; INTRAVENOUS; SUBCUTANEOUS at 16:11

## 2017-09-07 RX ADMIN — OXYCODONE HYDROCHLORIDE 15 MG: 5 TABLET ORAL at 15:07

## 2017-09-07 RX ADMIN — SODIUM CHLORIDE: 9 INJECTION, SOLUTION INTRAVENOUS at 12:15

## 2017-09-07 RX ADMIN — OXYCODONE AND ACETAMINOPHEN 2 TABLET: 5; 325 TABLET ORAL at 12:32

## 2017-09-07 RX ADMIN — LOVASTATIN 50 MG: 20 TABLET ORAL at 21:25

## 2017-09-07 RX ADMIN — ATENOLOL 100 MG: 100 TABLET ORAL at 08:20

## 2017-09-07 RX ADMIN — HYDRALAZINE HYDROCHLORIDE 100 MG: 50 TABLET, FILM COATED ORAL at 08:19

## 2017-09-07 RX ADMIN — OXYCODONE HYDROCHLORIDE 15 MG: 5 TABLET ORAL at 20:12

## 2017-09-07 RX ADMIN — AMPICILLIN SODIUM AND SULBACTAM SODIUM 3 G: 2; 1 INJECTION, POWDER, FOR SOLUTION INTRAMUSCULAR; INTRAVENOUS at 15:04

## 2017-09-07 ASSESSMENT — ENCOUNTER SYMPTOMS
HEARTBURN: 0
FEVER: 0
NAUSEA: 0
SHORTNESS OF BREATH: 0
COUGH: 0
DIARRHEA: 0
FOCAL WEAKNESS: 1
VOMITING: 0
ABDOMINAL PAIN: 0
HEADACHES: 0
DIZZINESS: 0
CHILLS: 0
BLURRED VISION: 0
SORE THROAT: 0
WHEEZING: 0

## 2017-09-07 ASSESSMENT — PAIN SCALES - GENERAL
PAINLEVEL_OUTOF10: 8
PAINLEVEL_OUTOF10: 7
PAINLEVEL_OUTOF10: 5
PAINLEVEL_OUTOF10: 8
PAINLEVEL_OUTOF10: 0
PAINLEVEL_OUTOF10: 9
PAINLEVEL_OUTOF10: 8
PAINLEVEL_OUTOF10: 6
PAINLEVEL_OUTOF10: 7
PAINLEVEL_OUTOF10: 8
PAINLEVEL_OUTOF10: 5
PAINLEVEL_OUTOF10: 8
PAINLEVEL_OUTOF10: 8

## 2017-09-07 NOTE — DISCHARGE PLANNING
Medical Social Work    Pt is a 61 y/o male who was admitted for gangrene of foot. Pt's payer source is through [facility]. Pt is an inmate at [facility]. Pt scheduled for OR today.    Plan: D/C plan in progress. Pt to D/C back to correctional center.

## 2017-09-07 NOTE — OR NURSING
"Report called to Jamilah GOODEN. Plan of care discussed. Patient resting with eyes closed, even and unlabored respirations noted. Patient reports 8/10 aching in left leg when awake. RN offered more pain medication. Patient states repeatedly \"I just want to go back upstairs to my room. Please. I can't relax here. Just please get me back upstairs.\" Patient returns back to sleep quickly, Jamilah GOODEN called.   "

## 2017-09-07 NOTE — THERAPY
"Speech Language Therapy Clinical Swallow Evaluation completed.  Functional Status: Clinical swallow evaluation completed on this date.  Patient pleasant and agreeable.  He reported he had taken himself off nectar thickened liquids ~2 1/2 years ago following his 3rd CVA.  [facility] sided [facility], reduced lingual and labial coordination, and lingual weakness.  Pt reported these deficits to be baseline.  Presentation of PO included all consistencies.  He presented with inconsistent cough with pudding and nectars but c/o sore throat due to recent intubation during surgery.  He had mild oral residue following dry solids but no overt s/sx of aspiration with mixed consistencies or thin liquids. At this time, recommend dysphagia 3/thins diet as tolerated.  SLP following during acute care.    Recommendations - Diet: Diet / Liquid Recommendation: Thin Liquid, Dysphagia III                          Strategies: Monitor during meals, Assistance needed for meal tray set-up, No Straws and Head of Bed at 90 Degrees                          Medication Administration: Medication Administration : Whole with Liquid Wash, Float Whole with Puree  Plan of Care: Will benefit from Speech Therapy 2 times per week  Post-Acute Therapy: Discharge to home with outpatient or home health for additional skilled therapy services.    See \"Rehab Therapy-Acute\" Patient Summary Report for complete documentation.   "

## 2017-09-07 NOTE — PROGRESS NOTES
"Pharmacy Kinetics 62 y.o. male on vancomycin day # 2 2017    Currently on Vancomycin 1500 mg iv q12hr (11, 23)    Indication for Treatment: Diabetic foot ulcer/SSTI     Pertinent history per medical record: Admitted on 2017. Presents to the emergency department with foot swelling and an ulcer. 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 His foot has gotten worse. It is now red and swollen. He has neuropathy and denies having pain. He has had no odor. According to outside physician and the redness and swelling is much worse today. Nothing in particular brought this on.      Other antibiotics: Ampicillin/Sulbactam 3 g IV q 6 hours     Allergies: Review of patient's allergies indicates no known allergies.      List concerns for renal function: age, obesity, severe PAD, paraplegia, hx CVA    Pertinent cultures to date:   Results for TORIBIO PRIETO (MRN [id number]) as of 2017 13:22   2017 21:24 2017 21:24   Source BLD BLD   Site PERIPHERAL PERIPHERAL   Significant Indicator NEG NEG       Recent Labs      17   1058  17   0516   WBC  19.0*  14.5*   NEUTSPOLYS  82.40*  81.50*     Recent Labs      17   1058  17   0516   BUN  28*  16   CREATININE  1.04  0.60   ALBUMIN  3.5   --      Recent Labs      17   0937   VANCOTROUGH  20.3*     Intake/Output Summary (Last 24 hours) at 17 1320  Last data filed at 17 0900   Gross per 24 hour   Intake             1115 ml   Output             1275 ml   Net             -160 ml      Blood pressure 149/66, pulse 80, temperature 37.1 °C (98.7 °F), resp. rate 18, height 1.702 m (5' 7\"), weight 99.8 kg (220 lb), SpO2 95 %. Temp (24hrs), Av.8 °C (98.2 °F), Min:36.5 °C (97.7 °F), Max:37.1 °C (98.7 °F)      A/P   1. Vancomycin dose change: 1800 mg IV q 24 hours starting tomorrow @ 1100.  2. Next vancomycin level: ~ 2 days.  3. Goal trough: 12-16 mcg/mL  4. Urine output ok.  Clearance " poor: trough of 20.  Severe PAD.  Paraplegia.  Will change interval to q 24 hours.  AKA done today.  Need for continuing dosing?  Follow up tomorrow.    JAKOB Rust, PharmD, BCPS

## 2017-09-07 NOTE — PROGRESS NOTES
[facility] Hospitalist Progress Note    Date of Service: 2017    Chief Complaint  62 y.o. male admitted 2017 with Gangrene, Diabetic foot ulcer, Cellulitis, PAD.    Interval Problem Update  Gangrene - for AKA today  Diabetes - controlled  HTN - controlled  Low Na     Consultants/Specialty  Vascular - Osei    Disposition  For surgery today        Review of Systems   Constitutional: Negative for chills and fever.   HENT: Negative for sore throat.    Eyes: Negative for blurred vision.   Respiratory: Negative for cough, shortness of breath and wheezing.    Cardiovascular: Negative for chest pain and leg swelling.   Gastrointestinal: Negative for abdominal pain, diarrhea, heartburn, nausea and vomiting.   Genitourinary: Negative for dysuria.   Neurological: Positive for focal weakness. Negative for dizziness and headaches.      Physical Exam  Laboratory/Imaging   Hemodynamics  Temp (24hrs), Av.7 °C (98 °F), Min:36.2 °C (97.2 °F), Max:36.9 °C (98.4 °F)   Temperature: 36.8 °C (98.3 °F)  Pulse  Av.6  Min: 58  Max: 86    Blood Pressure: 149/66, NIBP: 152/66      Respiratory      Respiration: 18, Pulse Oximetry: 96 %        RUL Breath Sounds: Clear, RML Breath Sounds: Diminished, RLL Breath Sounds: Diminished, ANEL Breath Sounds: Clear, LLL Breath Sounds: Diminished    Fluids    Intake/Output Summary (Last 24 hours) at 17 1043  Last data filed at 17 0900   Gross per 24 hour   Intake             1115 ml   Output             1275 ml   Net             -160 ml       Nutrition  Orders Placed This Encounter   Procedures   • DIET NPO     Standing Status:   Standing     Number of Occurrences:   8     Order Specific Question:   Type:     Answer:   At Midnight [5]     Order Specific Question:   Restrict to:     Answer:   Strict [1]     Physical Exam   Constitutional: He is oriented to person, place, and time. He appears well-developed and well-nourished.   HENT:   Head: Normocephalic and atraumatic.   Eyes:  Conjunctivae are normal. Pupils are equal, round, and reactive to light.   Neck: No tracheal deviation present. No thyromegaly present.   Cardiovascular: Normal rate and regular rhythm.    Pulmonary/Chest: Effort normal and breath sounds normal.   Abdominal: Soft. Bowel sounds are normal. He exhibits no distension. There is no tenderness.   Lymphadenopathy:     He has no cervical adenopathy.   Neurological: He is alert and oriented to person, place, and time.   Dysarthria  MMT RUE and RLE 5/5, LUE 1/5 with hand  3/5, LLE 1/5   Skin:   Gangrene of Left big and 4th toe with ulcer at L big toe  Erythema and swelling of left foot   Nursing note and vitals reviewed.      Recent Labs      09/06/17   1058  09/07/17   0516   WBC  19.0*  14.5*   RBC  4.29*  3.57*   HEMOGLOBIN  11.5*  9.4*   HEMATOCRIT  34.3*  28.6*   MCV  80.0*  80.1*   MCH  26.8*  26.3*   MCHC  33.5*  32.9*   RDW  36.1  36.4   PLATELETCT  378  273   MPV  9.1  9.2     Recent Labs      09/06/17   1058  09/06/17   2124  09/07/17   0516   SODIUM  126*  128*  128*   POTASSIUM  4.3   --   4.0   CHLORIDE  89*   --   95*   CO2  25   --   24   GLUCOSE  115*   --   95   BUN  28*   --   16   CREATININE  1.04   --   0.60   CALCIUM  9.9   --   8.9                      Assessment/Plan     * Gangrene of foot (CMS-HCC)- (present on admission)   Assessment & Plan    For AKA today        PAD (peripheral artery disease) (CMS-HCC)- (present on admission)   Assessment & Plan    Resume Aggrenox once okay with Surgery        Diabetic foot ulcer (CMS-HCC)   Assessment & Plan    For AKA        Cellulitis   Assessment & Plan    IV Vancomycin, Unasyn        Anemia   Assessment & Plan    follow cbc        Hyponatremia- (present on admission)   Assessment & Plan    IVF NS, follow bmp  Check TSH        History of stroke- (present on admission)   Assessment & Plan    Resume Aggrenox once okay with surgery, continueLLovastatin        HTN (hypertension)- (present on admission)    Assessment & Plan    Atenolol, Cozaar, Norvasc, Imdur  Check Echo        DM (diabetes mellitus) (CMS-HCC)- (present on admission)   Assessment & Plan    SSI  Check hgba1c        Dyslipidemia- (present on admission)   Assessment & Plan    Lovastatin, check lipid panel            Reviewed items::  Radiology images reviewed, EKG reviewed, Labs reviewed and Medications reviewed  Vaz catheter::  No Vaz  DVT prophylaxis pharmacological::  Enoxaparin (Lovenox)  Ulcer Prophylaxis::  No  Antibiotics:  Treating active infection/contamination beyond 24 hours perioperative coverage

## 2017-09-07 NOTE — CARE PLAN
Problem: Infection  Goal: Will remain free from infection  Outcome: PROGRESSING AS EXPECTED  Multiple antibiotics in use; vascular surgeon discussed AKA with patient.    Problem: Skin Integrity  Goal: Risk for impaired skin integrity will decrease  Outcome: PROGRESSING AS EXPECTED  Turn q2, float heels, waffle cushion for wheelchair.

## 2017-09-07 NOTE — PROGRESS NOTES
"Pharmacy Kinetics 62 y.o. male on vancomycin day # 1 2017    Received loading dose in the ER.    Indication for Treatment: Diabetic foot ulcer/SSTI    Pertinent history per medical record: Admitted on 2017. Presents to the emergency department with foot swelling and an ulcer. 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 His foot has gotten worse. It is now red and swollen. He has neuropathy and denies having pain. He has had no odor. According to outside physician and the redness and swelling is much worse today. Nothing in particular brought this on.     Other antibiotics: Unasyn    Allergies: Review of patient's allergies indicates no known allergies.     List concerns for renal function: age, obesity    Pertinent cultures to date:   Blood cultures collected in ER.    Recent Labs      17   1058   WBC  19.0*   NEUTSPOLYS  82.40*     Recent Labs      17   1058   BUN  28*   CREATININE  1.04   ALBUMIN  3.5     No results for input(s): VANCOTROUGH, VANCOPEAK, VANCORANDOM in the last 72 hours.No intake or output data in the 24 hours ending 17 1702   Blood pressure 111/72, pulse 72, temperature 36.2 °C (97.2 °F), resp. rate 18, height 1.702 m (5' 7\"), weight 99.8 kg (220 lb), SpO2 93 %. Temp (24hrs), Av.2 °C (97.2 °F), Min:36.2 °C (97.2 °F), Max:36.2 °C (97.2 °F)      A/P   1. Vancomycin dose change: Start vancomycin 15mg/kg q12hr (1500mg)  2. Next vancomycin level: Prior to the 4th or 5th dose or sooner if clinically indicated (not ordered)  3. Goal trough: 12-16 mcg/ml  4. Comments: Needs wound care. Dosing as above. Renal function is stable. Pharmacy will follow. Narrow therapy as able. Vascular consulted.    Ventura Delgadillo, PharmD, BCPS    Addendum: patient is wheelchair bound. Renal function could be overestimated, due to different body habitus.  Trough today prior to the 3rd dose. To ensure level is not supratherapeutic.  "

## 2017-09-07 NOTE — OR NURSING
Patient off unit with transport and guards x2. Patient repositioned for comfort prior to departure.

## 2017-09-07 NOTE — CONSULTS
DATE OF CONSULTATION: 9/6/2017     REFERRING PHYSICIAN: Limited preservation service    CONSULTING PHYSICIAN: Ken Osei M.D.      REASON FOR CONSULTATION: Evaluate patient with dry gangrene left toes and significant peripheral arterial disease    I have been asked by the limb preservation service to see the patient in surgical consultation   for evaluation of severe peripheral arterial disease and tissue loss left leg.    HISTORY OF PRESENT ILLNESS: The patient is a 62-year-old gentleman who is incarcerated and was brought in for evaluation of increasing pain in his left foot. The patient has clear evidence of severe ongoing chronic peripheral arterial disease with dry gangrene on the left great toe and left 4th toe. The patient has suffered multiple cerebrovascular accidents in the past and has been nonambulatory for the past several years. He has considerable medical problems including type II diabetes hypertension peripheral arterial disease and multiple cerebrovascular accidents with left hemiparesis.    PAST MEDICAL HISTORY:  has a past medical history of CVA (cerebral infarction) (2011); Diabetes (CMS-HCC); Dysphagia; HTN (hypertension); and Obesity.     PAST SURGICAL HISTORY:  has a past surgical history that includes vitrectomy posterior (Left, 1/5/2016).     ALLERGIES: No Known Allergies     CURRENT MEDICATIONS:   Home Medications     Reviewed by Tigre Agudelo (Pharmacy Tech) on 09/06/17 at 1113  Med List Status: Complete   Medication Last Dose Status   amlodipine (NORVASC) 5 MG Tab 9/5/2017 Active   atenolol (TENORMIN) 100 MG Tab 9/5/2017 Active   Dextromethorphan-Quinidine (NUEDEXTA) 20-10 MG Cap 9/5/2017 Active   dipyridamole-aspirin (AGGRENOX)  MG CAPSULE SR 12 HR 9/5/2017 Active   duloxetine (CYMBALTA) 30 MG Cap DR Particles 9/5/2017 Active   furosemide (LASIX) 40 MG Tab 9/5/2017 Active   glipiZIDE (GLUCOTROL) 5 MG Tab 9/5/2017 Active   hydrALAZINE (APRESOLINE) 50 MG Tab 9/5/2017  Active   isosorbide mononitrate SR (IMDUR) 30 MG TABLET SR 24 HR 9/5/2017 Active   losartan (COZAAR) 50 MG Tab 9/5/2017 Active   lovastatin (MEVACOR) 40 MG tablet 9/5/2017 Active   metformin (GLUCOPHAGE) 1000 MG tablet 9/5/2017 Active   omeprazole (PRILOSEC OTC) 20 MG tablet 9/5/2017 Active   oxycodone (OXY-IR) 15 MG immediate release tablet 9/6/2017 Active   spironolactone (ALDACTONE) 50 MG Tab 9/5/2017 Active                FAMILY HISTORY: No family history on file.     SOCIAL HISTORY:   Social History     Social History Main Topics   • Smoking status: Never Smoker   • Smokeless tobacco: Not on file   • Alcohol use No   • Drug use: No   • Sexual activity: Not on file       Review of Systems:          PHYSICAL EXAMINATION:   Patient is awake and alert sitting in bed in no apparent distress.  The patient has a slight speech impairment as result of his previous strokes but is able to communicate.  Patient's lungs are clear abdomen is soft  Absent left femoral popliteal and pedal pulses.  Absent right lower extremities pulses  Right foot is warm and perfused.  Left foot has a completely dry gangrenous 4th toe and a significant area of dry gangrene on the left great toe.  Minimal motor function in the left leg.        LABORATORY VALUES:   Recent Labs      09/06/17   1058   WBC  19.0*   RBC  4.29*   HEMOGLOBIN  11.5*   HEMATOCRIT  34.3*   MCV  80.0*   MCH  26.8*   MCHC  33.5*   RDW  36.1   PLATELETCT  378   MPV  9.1     Recent Labs      09/06/17   1058   SODIUM  126*   POTASSIUM  4.3   CHLORIDE  89*   CO2  25   GLUCOSE  115*   BUN  28*   CREATININE  1.04   CALCIUM  9.9     Recent Labs      09/06/17   1058   ASTSGOT  30   ALTSGPT  21   TBILIRUBIN  0.4   ALKPHOSPHAT  72   GLOBULIN  4.7*            IMAGING:   LE ART DUPLX/IMAG (Specify in Comments Left, Right Or Bilateral)   Final Result      LE ART/CORDELIA (Specify in Comments Left, Right Or Bilateral)   Final Result      DX-FOOT-COMPLETE 3+ LEFT   Final Result      1.  There  is diffuse swelling with gas in the plantar soft tissues in the distal metatarsal regions. There is no periostitis to suggest osteomyelitis.   2.  There is extensive atherosclerotic change throughout the vessels of the foot and ankle.   3.  There is degenerative change as described above.      DX-CHEST-PORTABLE (1 VIEW)   Final Result      1.  There is mild peribronchial wall thickening suggesting possible vascular congestion or edema.   2.  Right hilar opacity is likely due to atelectasis.          IMPRESSION AND PLAN:     Active Hospital Problems    Diagnosis   • Cellulitis [L03.90]     Severe chronic peripheral arterial disease with tissue loss  Left-sided hemiparesis  History of multiple cerebrovascular accidents  Nonambulatory due to neurologic deficit  Diabetes  Hypertension    Plan- long discussion with this patient with respect to the pathophysiology and natural history of peripheral arterial disease. We specifically discussed the dry gangrene in the patient's left foot and the cause of that necrosis being severe peripheral arterial disease. Patient's noninvasive vascular studies demonstrate monophasic flow with multilevel pocket just down the iliac superficial femoral and tibial vessels.  I had a discussion with the patient with respect to recommendations.  I have recommended a left above-knee amputation.  We discussed the fact that a simple toe amputation or transmetatarsal amputation is not going to heal based on the patient's noninvasive arterial imaging. We discussed the fact that he is nonambulatory and aggressive revascularization attempts with tibial artery bypassing is not warranted in a patient who is nonambulatory. The risk of those peripheral vascular operations would be excessive with respect to his overall health. The patient is also not able to ambulate with a prosthesis due to his paralysis and his nonambulatory status for the last several years. Based on this a below-knee amputation is not  warranted. I discussed with him all this in great detail and he is considering the options but will likely agree to an above-knee amputation. We'll keep the patient nothing by mouth after midnight and discussed further in the morning.  ____________________________________   Ken Osei M.D.          DD: 9/6/2017   DT: 8:05 PM

## 2017-09-07 NOTE — H&P
CHIEF COMPLAINT:  Left foot ulcer and infection.    HISTORY OF PRESENT ILLNESS:  The patient is a 62-year-old male who is   currently incarcerated has been followed up for a left foot ulcer and dry   gangrene at his incarceration facility.  Over the past few days, he has been   noted to have increased swelling and redness and was transferred to our   facility for further evaluation.  The patient has had previous history of   stroke.  He denies any pain in his lower extremity.  He reports that he noted   an ulcer on his great toe from rubbing against his shoes and following that   over the past 2-month, he noted to have gangrene of his left fourth digit.    The patient is wheelchair bound secondary to history of recurrent strokes.  He   has not noted any drainage from his ulcers.  He is not sure how well his   diabetes is controlled.  He denies any fever or chills.  He denies any chest   pain or shortness of breath.  No abdominal pain, no diarrhea, no melena, no   rectal bleeding.    REVIEW OF SYSTEMS:  As above, otherwise reviewed and negative.    PAST MEDICAL HISTORY:  Significant for;  1.  Recurrent strokes with left hemiparesis and right lower extremity weakness   and is wheelchair bound.  2.  Type 2 diabetes.  3.  Hypertension.    PAST SURGICAL HISTORY:  Posterior vitrectomy.    SOCIAL HISTORY:  He is an ex-smoker.  No alcohol or illicit drug use.    FAMILY HISTORY:  Reviewed, not pertinent to the presenting problem.    CURRENT MEDICATIONS:  Amlodipine 5 mg daily, Atenolol 100 mg daily, Nuedexta 1   tablet b.i.d., Aggrenox 25/200 two times a day, Cymbalta 30 mg daily, Lasix   40 mg daily, glipizide 10 mg daily, hydralazine 100 mg 2 times a day, Imdur 30   mg 2 times a day, losartan 50 mg daily, lovastatin 40 mg daily, metformin   1000 mg b.i.d., Prilosec 20 mg daily, oxycodone 50 mg every 4 hours as needed,   Aldactone 50 mg 2 times a day.    PHYSICAL EXAMINATION:  GENERAL:  He is alert, oriented x3.  VITAL  SIGNS:  Temperature is 36.2, pulse is 69, respiratory rate is 18, blood   pressure 111/72, pulse oximetry is 95%.  HEAD AND NECK:  Pupils equal.  Supple neck.  No jugular venous distention.    Oropharynx is clear.  No cervical lymphadenopathy.  HEART:  Regular rate and rhythm, normal S1, S2.  No murmurs, rubs or gallops.  LUNGS:  Clear with symmetric air entry bilaterally.  No chest wall tenderness.  ABDOMEN:  Soft, nontender.  Bowel sounds are positive.  No hepatosplenomegaly.  EXTREMITIES:  +1 edema in the left lower extremity, trace edema in the other   extremities.  No clubbing.  Pulses are nonpalpable in the lower extremities.    He has a gangrenous left fourth toe with swelling and fluctuance over the   plantar aspect of his left foot.  He has an ulcer over his left first toe with   no surrounding erythema or drainage.  He has erythema over his left lateral   foot and distal leg.  SKIN:  He has the above noted skin changes in his lower extremities.    DIAGNOSTICS:  White blood cell count is 19, hemoglobin 11.5, hematocrit 34.3,   platelet count 378, 82% neutrophils.  Lactic acid is 1.3.  Sodium 126,   potassium 4.3, chloride 89, bicarbonate 25, glucose 115, BUN 28, creatinine   1.04.  AST is 30, ALT 21, alk phos 72.  Chest x-ray reveals mild peribronchial   wall thickening suggesting possible vascular congestion or edema.  Right   hilar opacities likely due to atelectasis.  X-ray of the left foot revealed   diffuse swelling with gas in the plantar soft tissues and the distal   metatarsal regions.  There is no periostitis to suggest osteomyelitis.  There   was extensive atherosclerotic change throughout the vessels of the foot and   ankle.  There is degenerative change.  Lower extremity arterial study revealed   monophasic left femoral artery waveforms suspicion for possible left iliac   artery stenosis, heavy calcification in the left superficial femoral artery   with distal occlusion, possible left lower  extremity tibial arterial occlusion   with distal reconstitution of the anterior tibial and dorsal pedal artery.    ASSESSMENT AND PLAN:  1.  Limb ischemia with left first and fourth toes gangrenes and cellulitis and   deep soft tissue infection.  2.  Peripheral vascular disease.  3.  Type 2 diabetes, uncertain control with vascular manifestations.  4.  Hyponatremia.  5.  Hypertension.    PLAN:  Patient will be admitted.  He will be started on IV vancomycin and   Unasyn pending culture results.    He will likely need revascularization and likely irrigation and debridement.    Discussed the case with limb preservation services and Dr. Osei will be   evaluating the patient from vascular surgery.    We will hold his oral hypoglycemic agents.  He will be kept n.p.o. pending   vascular surgery evaluation.    We will monitor his blood sugars and cover him with sliding scale insulin.  We   will check a hemoglobin A1c.    I will hold his Lasix as he appears clinically intravascularly dry.  We will   gently hydrate him while he is n.p.o.    We will hold his Aggrenox pending surgical evaluation and plan on resuming it   postoperatively when cleared by vascular surgery.    We will continue with his Norvasc, atenolol, Imdur, losartan, spironolactone   and monitor his blood pressure and adjust accordingly.  We will continue with   his lovastatin.    We will use SCDs for DVT prophylaxis pending surgical evaluation.    Plan of care discussed with the patient and his questions answered.       ____________________________________     MD MICHAEL PRUITT / SUNNY    DD:  09/06/2017 18:05:13  DT:  09/06/2017 18:51:15    D#:  [id number]  Job#:  [id number]

## 2017-09-07 NOTE — CARE PLAN
Problem: Safety  Goal: Will remain free from injury  Outcome: PROGRESSING AS EXPECTED  Safety maintained. Bed low and locked position, call light within reach, hourly rounding,  socks on, and pt instructed to call for assist.

## 2017-09-07 NOTE — WOUND TEAM
Wound consult placed for left foot.  Patient in OR for amputation.  No involvement by wound team indicated.

## 2017-09-07 NOTE — PROGRESS NOTES
Patient admitted to room 310 from ER. Oriented to room. Blood sugar 61 on admission. Left sided deficits noted. Left foot with open areas on great toe and 4th, 5th digits.

## 2017-09-07 NOTE — PROGRESS NOTES
Pt back to room. Guards at bedside. Pt alert and oriented pain controlled at this time. VSS. Dsg cd&I.

## 2017-09-07 NOTE — OP REPORT
09/07/17    PRE-OPERATIVE DIAGNOSIS - Severe PAD with tissue loss, paraplegia, history of CVA     POST-OPERATIVE DIAGNOSIS - Same     PROCEDURE PERFORMED - Left above knee amputation through proximal femur    SURGEON - Ken Osei M.D.    ASSISTANT - GEO Short    TYPE OF ANESTHESIA - General     ANESTHESIOLOGIST  - Dr. Waller    DRAINS - None    INDICATIONS - 62 y.o.      PROCEDURE IN DETAIL -the patient was taken to the operating suite placed in the supine position patient's left lower extremity was circumferentially prepped and draped in sterile fashion. Tourniquet was placed high on the thigh. The leg was exsanguinated and the tourniquet was brought up to pressure. Circumferential incision was made above the knee and fishmouth fashion. Incision was carried down to subcutaneous tissue using electrocautery and sharp dissection. The femur was transected using a bone saw and the tourniquet was brought down and hemostasis was assured with interrupted 2-0 Vicryl sutures as well as electrocautery. 2-0 Vicryl sutures were then used to reapproximate the fascia skin staples to reapproximate the skin sterile dressings were applied.      Ken Osei M.D.

## 2017-09-08 PROBLEM — I69.391 DYSPHAGIA AS LATE EFFECT OF STROKE: Status: ACTIVE | Noted: 2017-09-08

## 2017-09-08 LAB
ANION GAP SERPL CALC-SCNC: 10 MMOL/L (ref 0–11.9)
BASOPHILS # BLD AUTO: 0.4 % (ref 0–1.8)
BASOPHILS # BLD: 0.05 K/UL (ref 0–0.12)
BNP SERPL-MCNC: 116 PG/ML (ref 0–100)
BUN SERPL-MCNC: 9 MG/DL (ref 8–22)
CALCIUM SERPL-MCNC: 9.2 MG/DL (ref 8.5–10.5)
CHLORIDE SERPL-SCNC: 93 MMOL/L (ref 96–112)
CHOLEST SERPL-MCNC: 117 MG/DL (ref 100–199)
CO2 SERPL-SCNC: 25 MMOL/L (ref 20–33)
CREAT SERPL-MCNC: 0.65 MG/DL (ref 0.5–1.4)
EOSINOPHIL # BLD AUTO: 0.04 K/UL (ref 0–0.51)
EOSINOPHIL NFR BLD: 0.3 % (ref 0–6.9)
ERYTHROCYTE [DISTWIDTH] IN BLOOD BY AUTOMATED COUNT: 37.4 FL (ref 35.9–50)
EST. AVERAGE GLUCOSE BLD GHB EST-MCNC: 154 MG/DL
GFR SERPL CREATININE-BSD FRML MDRD: >60 ML/MIN/1.73 M 2
GLUCOSE BLD-MCNC: 194 MG/DL (ref 65–99)
GLUCOSE BLD-MCNC: 205 MG/DL (ref 65–99)
GLUCOSE BLD-MCNC: 219 MG/DL (ref 65–99)
GLUCOSE BLD-MCNC: 221 MG/DL (ref 65–99)
GLUCOSE SERPL-MCNC: 189 MG/DL (ref 65–99)
HBA1C MFR BLD: 7 % (ref 0–5.6)
HCT VFR BLD AUTO: 30.4 % (ref 42–52)
HDLC SERPL-MCNC: 12 MG/DL
HGB BLD-MCNC: 9.9 G/DL (ref 14–18)
IMM GRANULOCYTES # BLD AUTO: 0.11 K/UL (ref 0–0.11)
IMM GRANULOCYTES NFR BLD AUTO: 0.9 % (ref 0–0.9)
LDLC SERPL CALC-MCNC: 76 MG/DL
LV EJECT FRACT  99904: 55
LV EJECT FRACT MOD 2C 99903: 56.71
LV EJECT FRACT MOD 4C 99902: 42.54
LV EJECT FRACT MOD BP 99901: 50.63
LYMPHOCYTES # BLD AUTO: 1.12 K/UL (ref 1–4.8)
LYMPHOCYTES NFR BLD: 8.7 % (ref 22–41)
MCH RBC QN AUTO: 26.5 PG (ref 27–33)
MCHC RBC AUTO-ENTMCNC: 32.6 G/DL (ref 33.7–35.3)
MCV RBC AUTO: 81.3 FL (ref 81.4–97.8)
MONOCYTES # BLD AUTO: 0.94 K/UL (ref 0–0.85)
MONOCYTES NFR BLD AUTO: 7.3 % (ref 0–13.4)
NEUTROPHILS # BLD AUTO: 10.61 K/UL (ref 1.82–7.42)
NEUTROPHILS NFR BLD: 82.4 % (ref 44–72)
NRBC # BLD AUTO: 0 K/UL
NRBC BLD AUTO-RTO: 0 /100 WBC
PLATELET # BLD AUTO: 320 K/UL (ref 164–446)
PMV BLD AUTO: 9.7 FL (ref 9–12.9)
POTASSIUM SERPL-SCNC: 4.2 MMOL/L (ref 3.6–5.5)
RBC # BLD AUTO: 3.74 M/UL (ref 4.7–6.1)
SODIUM SERPL-SCNC: 128 MMOL/L (ref 135–145)
TRIGL SERPL-MCNC: 147 MG/DL (ref 0–149)
TSH SERPL DL<=0.005 MIU/L-ACNC: 0.95 UIU/ML (ref 0.3–3.7)
WBC # BLD AUTO: 12.9 K/UL (ref 4.8–10.8)

## 2017-09-08 PROCEDURE — 700105 HCHG RX REV CODE 258: Performed by: HOSPITALIST

## 2017-09-08 PROCEDURE — 83880 ASSAY OF NATRIURETIC PEPTIDE: CPT

## 2017-09-08 PROCEDURE — 80061 LIPID PANEL: CPT

## 2017-09-08 PROCEDURE — 97162 PT EVAL MOD COMPLEX 30 MIN: CPT

## 2017-09-08 PROCEDURE — 700111 HCHG RX REV CODE 636 W/ 250 OVERRIDE (IP): Performed by: FAMILY MEDICINE

## 2017-09-08 PROCEDURE — A9270 NON-COVERED ITEM OR SERVICE: HCPCS | Performed by: HOSPITALIST

## 2017-09-08 PROCEDURE — 700102 HCHG RX REV CODE 250 W/ 637 OVERRIDE(OP): Performed by: HOSPITALIST

## 2017-09-08 PROCEDURE — 82962 GLUCOSE BLOOD TEST: CPT

## 2017-09-08 PROCEDURE — 700105 HCHG RX REV CODE 258: Performed by: FAMILY MEDICINE

## 2017-09-08 PROCEDURE — 84443 ASSAY THYROID STIM HORMONE: CPT

## 2017-09-08 PROCEDURE — 93306 TTE W/DOPPLER COMPLETE: CPT | Mod: 26 | Performed by: INTERNAL MEDICINE

## 2017-09-08 PROCEDURE — 93306 TTE W/DOPPLER COMPLETE: CPT

## 2017-09-08 PROCEDURE — 85025 COMPLETE CBC W/AUTO DIFF WBC: CPT

## 2017-09-08 PROCEDURE — G8978 MOBILITY CURRENT STATUS: HCPCS | Mod: CM

## 2017-09-08 PROCEDURE — G8979 MOBILITY GOAL STATUS: HCPCS | Mod: CJ

## 2017-09-08 PROCEDURE — 700111 HCHG RX REV CODE 636 W/ 250 OVERRIDE (IP)

## 2017-09-08 PROCEDURE — 770001 HCHG ROOM/CARE - MED/SURG/GYN PRIV*

## 2017-09-08 PROCEDURE — 36415 COLL VENOUS BLD VENIPUNCTURE: CPT

## 2017-09-08 PROCEDURE — 99233 SBSQ HOSP IP/OBS HIGH 50: CPT | Performed by: FAMILY MEDICINE

## 2017-09-08 PROCEDURE — 80048 BASIC METABOLIC PNL TOTAL CA: CPT

## 2017-09-08 PROCEDURE — 700111 HCHG RX REV CODE 636 W/ 250 OVERRIDE (IP): Performed by: NURSE PRACTITIONER

## 2017-09-08 PROCEDURE — 700105 HCHG RX REV CODE 258

## 2017-09-08 PROCEDURE — 700111 HCHG RX REV CODE 636 W/ 250 OVERRIDE (IP): Performed by: HOSPITALIST

## 2017-09-08 RX ORDER — INSULIN GLARGINE 100 [IU]/ML
10 INJECTION, SOLUTION SUBCUTANEOUS
Status: DISCONTINUED | OUTPATIENT
Start: 2017-09-09 | End: 2017-09-09

## 2017-09-08 RX ADMIN — SPIRONOLACTONE 50 MG: 50 TABLET ORAL at 08:19

## 2017-09-08 RX ADMIN — SPIRONOLACTONE 50 MG: 50 TABLET ORAL at 20:27

## 2017-09-08 RX ADMIN — HYDROMORPHONE HYDROCHLORIDE 0.5 MG: 1 INJECTION, SOLUTION INTRAMUSCULAR; INTRAVENOUS; SUBCUTANEOUS at 06:39

## 2017-09-08 RX ADMIN — INSULIN LISPRO 3 UNITS: 100 INJECTION, SOLUTION INTRAVENOUS; SUBCUTANEOUS at 11:13

## 2017-09-08 RX ADMIN — LOVASTATIN 50 MG: 20 TABLET ORAL at 20:28

## 2017-09-08 RX ADMIN — HYDROMORPHONE HYDROCHLORIDE 0.5 MG: 1 INJECTION, SOLUTION INTRAMUSCULAR; INTRAVENOUS; SUBCUTANEOUS at 01:54

## 2017-09-08 RX ADMIN — SODIUM CHLORIDE: 9 INJECTION, SOLUTION INTRAVENOUS at 11:19

## 2017-09-08 RX ADMIN — LOSARTAN POTASSIUM 50 MG: 50 TABLET, FILM COATED ORAL at 08:19

## 2017-09-08 RX ADMIN — STANDARDIZED SENNA CONCENTRATE AND DOCUSATE SODIUM 2 TABLET: 8.6; 5 TABLET, FILM COATED ORAL at 20:28

## 2017-09-08 RX ADMIN — AMPICILLIN SODIUM AND SULBACTAM SODIUM 3 G: 2; 1 INJECTION, POWDER, FOR SOLUTION INTRAMUSCULAR; INTRAVENOUS at 20:26

## 2017-09-08 RX ADMIN — VANCOMYCIN HYDROCHLORIDE 1800 MG: 100 INJECTION, POWDER, LYOPHILIZED, FOR SOLUTION INTRAVENOUS at 11:18

## 2017-09-08 RX ADMIN — INSULIN LISPRO 3 UNITS: 100 INJECTION, SOLUTION INTRAVENOUS; SUBCUTANEOUS at 17:04

## 2017-09-08 RX ADMIN — ISOSORBIDE MONONITRATE 30 MG: 30 TABLET, EXTENDED RELEASE ORAL at 08:19

## 2017-09-08 RX ADMIN — AMPICILLIN SODIUM AND SULBACTAM SODIUM 3 G: 2; 1 INJECTION, POWDER, FOR SOLUTION INTRAMUSCULAR; INTRAVENOUS at 08:19

## 2017-09-08 RX ADMIN — STANDARDIZED SENNA CONCENTRATE AND DOCUSATE SODIUM 2 TABLET: 8.6; 5 TABLET, FILM COATED ORAL at 08:19

## 2017-09-08 RX ADMIN — ISOSORBIDE MONONITRATE 30 MG: 30 TABLET, EXTENDED RELEASE ORAL at 20:27

## 2017-09-08 RX ADMIN — OXYCODONE HYDROCHLORIDE 15 MG: 5 TABLET ORAL at 00:13

## 2017-09-08 RX ADMIN — HYDROMORPHONE HYDROCHLORIDE 0.5 MG: 1 INJECTION, SOLUTION INTRAMUSCULAR; INTRAVENOUS; SUBCUTANEOUS at 10:42

## 2017-09-08 RX ADMIN — INSULIN LISPRO 3 UNITS: 100 INJECTION, SOLUTION INTRAVENOUS; SUBCUTANEOUS at 23:22

## 2017-09-08 RX ADMIN — OXYCODONE HYDROCHLORIDE 15 MG: 5 TABLET ORAL at 04:26

## 2017-09-08 RX ADMIN — ATENOLOL 100 MG: 100 TABLET ORAL at 08:19

## 2017-09-08 RX ADMIN — ENOXAPARIN SODIUM 40 MG: 100 INJECTION SUBCUTANEOUS at 08:20

## 2017-09-08 RX ADMIN — AMPICILLIN SODIUM AND SULBACTAM SODIUM 3 G: 2; 1 INJECTION, POWDER, FOR SOLUTION INTRAMUSCULAR; INTRAVENOUS at 01:54

## 2017-09-08 RX ADMIN — AMLODIPINE BESYLATE 10 MG: 10 TABLET ORAL at 08:17

## 2017-09-08 RX ADMIN — HYDRALAZINE HYDROCHLORIDE 100 MG: 50 TABLET, FILM COATED ORAL at 08:19

## 2017-09-08 RX ADMIN — INSULIN LISPRO 2 UNITS: 100 INJECTION, SOLUTION INTRAVENOUS; SUBCUTANEOUS at 05:49

## 2017-09-08 RX ADMIN — OXYCODONE HYDROCHLORIDE 15 MG: 5 TABLET ORAL at 20:31

## 2017-09-08 RX ADMIN — OXYCODONE HYDROCHLORIDE 15 MG: 5 TABLET ORAL at 15:08

## 2017-09-08 RX ADMIN — OMEPRAZOLE 20 MG: 20 CAPSULE, DELAYED RELEASE ORAL at 20:27

## 2017-09-08 RX ADMIN — HYDRALAZINE HYDROCHLORIDE 100 MG: 50 TABLET, FILM COATED ORAL at 20:27

## 2017-09-08 RX ADMIN — DULOXETINE HYDROCHLORIDE 30 MG: 30 CAPSULE, DELAYED RELEASE ORAL at 08:19

## 2017-09-08 RX ADMIN — OXYCODONE HYDROCHLORIDE 15 MG: 5 TABLET ORAL at 08:24

## 2017-09-08 RX ADMIN — AMPICILLIN SODIUM AND SULBACTAM SODIUM 3 G: 2; 1 INJECTION, POWDER, FOR SOLUTION INTRAMUSCULAR; INTRAVENOUS at 15:08

## 2017-09-08 ASSESSMENT — PAIN SCALES - GENERAL
PAINLEVEL_OUTOF10: 7
PAINLEVEL_OUTOF10: 7
PAINLEVEL_OUTOF10: 5
PAINLEVEL_OUTOF10: 8
PAINLEVEL_OUTOF10: 7
PAINLEVEL_OUTOF10: 6
PAINLEVEL_OUTOF10: 7
PAINLEVEL_OUTOF10: 4
PAINLEVEL_OUTOF10: 5
PAINLEVEL_OUTOF10: 6
PAINLEVEL_OUTOF10: 7

## 2017-09-08 ASSESSMENT — ENCOUNTER SYMPTOMS
HEADACHES: 0
SHORTNESS OF BREATH: 0
COUGH: 0
CHILLS: 0
WHEEZING: 0
ABDOMINAL PAIN: 0
HEARTBURN: 0
SORE THROAT: 0
NAUSEA: 0
DIZZINESS: 0
BLURRED VISION: 0
VOMITING: 0
FEVER: 0
FOCAL WEAKNESS: 1
DIARRHEA: 0

## 2017-09-08 ASSESSMENT — COGNITIVE AND FUNCTIONAL STATUS - GENERAL
TURNING FROM BACK TO SIDE WHILE IN FLAT BAD: A LOT
MOVING TO AND FROM BED TO CHAIR: UNABLE
STANDING UP FROM CHAIR USING ARMS: TOTAL
SUGGESTED CMS G CODE MODIFIER MOBILITY: CM
MOVING FROM LYING ON BACK TO SITTING ON SIDE OF FLAT BED: UNABLE
WALKING IN HOSPITAL ROOM: TOTAL
CLIMB 3 TO 5 STEPS WITH RAILING: TOTAL
MOBILITY SCORE: 7

## 2017-09-08 ASSESSMENT — GAIT ASSESSMENTS: GAIT LEVEL OF ASSIST: UNABLE TO PARTICIPATE

## 2017-09-08 NOTE — CARE PLAN
Problem: Discharge Barriers/Planning  Goal: Patient's continuum of care needs will be met  Pt is on IV abx. Pt is also waiting for a PT and OT evaluation and ECHO to be done.     Problem: Pain Management  Goal: Pain level will decrease to patient's comfort goal  Will medicate for pain PRN see MAR

## 2017-09-08 NOTE — PROGRESS NOTES
Notified GEO Rosado about pts pain control, per telephone order with read back upped IV diladudid to 0.5mg.

## 2017-09-08 NOTE — THERAPY
"Physical Therapy Evaluation completed.   Bed Mobility:  Supine to Sit: Moderate Assist  Transfers: Sit to Stand: Unable to Participate  Gait: Level Of Assist: Unable to Participate with Will Continue to Assess for Equipment Needs       Plan of Care: Will benefit from Physical Therapy 3 times per week  Discharge Recommendations: Equipment: Will Continue to Assess for Equipment Needs. Post-acute therapy Discharge to home with outpatient or home health for additional skilled therapy services.    See \"Rehab Therapy-Acute\" Patient Summary Report for complete documentation.   62 year old male with history of CVA in 2011, 2013, and 2015 resulting in significant left sided hemiparesis presents s/p left AKA due to gangrene. He needs significant encouragement to participate as he reports significant pain and does not want to. He uses his friends to assist with ambulation in hallways, has previously been able to transfer himself from wheelchair to his bed without assist. He denies history of falls. He will benefit from continued skilled acute physical therapy services during the course of his stay to improve activity tolerance, balance, strength, and to educate regarding prevention of complications from new AKA including hip ROM, proper residual limb care.  "

## 2017-09-08 NOTE — PROGRESS NOTES
Assumed care of patient at 1915, received report from ted RN. Communication board updated and reviewed with pt and guards. Pt has two guards at the bedside. Assessment complete. No other needs at this time.

## 2017-09-08 NOTE — PROGRESS NOTES
"Pharmacy Kinetics 62 y.o. male on vancomycin day # 3  2017    Currently on Vancomycin 1800 mg iv q24hr (1100)     Indication for Treatment: Diabetic foot ulcer/SSTI     Pertinent history per medical record: Admitted on 2017. Presents to the emergency department with foot swelling and an ulcer. 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 His foot has gotten worse. It is now red and swollen. He has neuropathy and denies having pain. He has had no odor. According to outside physician and the redness and swelling is much worse.     Other antibiotics: Ampicillin/Sulbactam 3 g IV q 6 hours     Allergies: Review of patient's allergies indicates no known allergies.      List concerns for renal function: age, obesity, severe PAD, paraplegia, hx CVA    Pertinent cultures to date:   17():PBCx2:NGTD    Recent Labs      17   1058  17   0516  17   0203   WBC  19.0*  14.5*  12.9*   NEUTSPOLYS  82.40*  81.50*  82.40*     Recent Labs      17   1058  17   0516  17   0203   BUN  28*  16  9   CREATININE  1.04  0.60  0.65   ALBUMIN  3.5   --    --      Recent Labs      17   0937   VANCOTROUGH  20.3*     Intake/Output Summary (Last 24 hours) at 17 1055  Last data filed at 17 0642   Gross per 24 hour   Intake              960 ml   Output             1275 ml   Net             -315 ml      Blood pressure 158/79, pulse 91, temperature 37.3 °C (99.2 °F), resp. rate 17, height 1.702 m (5' 7\"), weight 99.8 kg (220 lb), SpO2 98 %. Temp (24hrs), Av.9 °C (98.4 °F), Min:36.1 °C (97 °F), Max:37.5 °C (99.5 °F)      A/P                  1. Vancomycin dose change: No change    2. Next vancomycin level: 17@1030  3. Goal trough: 12-16 mcg/mL  4. Comments.  Clearance poor, severe PAD,  paraplegia. WBC elevated.   AKA done yesterday , cx result unchanged. Will follow MD notes for ABX plan. Continue same for now.    Jonh " Crumby PharmD BCPS

## 2017-09-08 NOTE — PROGRESS NOTES
Received report from night RN and assumed care at 0700.  Pt A/O x 4.  O2 2L via NC, O2 sat 95%.  PIV on R antecubital, running NS at 50 ml/hr.  Pain 4/10, medicated per MAR.  POC discussed.  All needs met at this time.  Call light within reach.  Bed in the lowest position.  Hourly rounding in place.

## 2017-09-08 NOTE — PROGRESS NOTES
[facility]ist Progress Note    Date of Service: 2017    Chief Complaint  62 y.o. male admitted 2017 with Gangrene, Diabetic foot ulcer, Cellulitis, PAD.    Interval Problem Update  Gangrene - AKA done, pain controlled  Diabetes - not controlled  HTN - controlled  Dysphagia - swallow eval done  Low Na     Consultants/Specialty  Vascular - Osei    Disposition  Wound care        Review of Systems   Constitutional: Negative for chills and fever.   HENT: Negative for sore throat.    Eyes: Negative for blurred vision.   Respiratory: Negative for cough, shortness of breath and wheezing.    Cardiovascular: Negative for chest pain and leg swelling.   Gastrointestinal: Negative for abdominal pain, diarrhea, heartburn, nausea and vomiting.   Genitourinary: Negative for dysuria.   Neurological: Positive for focal weakness. Negative for dizziness and headaches.      Physical Exam  Laboratory/Imaging   Hemodynamics  Temp (24hrs), Av.8 °C (98.3 °F), Min:36.1 °C (97 °F), Max:37.5 °C (99.5 °F)   Temperature: 36.6 °C (97.8 °F)  Pulse  Av.8  Min: 58  Max: 91    Blood Pressure: 159/68      Respiratory      Respiration: 17, Pulse Oximetry: 96 % (92 on room air with sitting EOB)        RUL Breath Sounds: Clear, RML Breath Sounds: Diminished, RLL Breath Sounds: Diminished, ANEL Breath Sounds: Clear, LLL Breath Sounds: Diminished    Fluids    Intake/Output Summary (Last 24 hours) at 17 1409  Last data filed at 17 1300   Gross per 24 hour   Intake             1210 ml   Output             1675 ml   Net             -465 ml       Nutrition  Orders Placed This Encounter   Procedures   • DIET ORDER     Standing Status:   Standing     Number of Occurrences:   1     Order Specific Question:   Diet:     Answer:   Diabetic [3]     Order Specific Question:   Texture/Fiber modifications:     Answer:   Dysphagia 3(Mechanical Soft)specify fluid consistency(question 6) [3]     Order Specific Question:    Consistency/Fluid modifications:     Answer:   Thin Liquids [3]     Physical Exam   Constitutional: He is oriented to person, place, and time. He appears well-developed and well-nourished.   HENT:   Head: Normocephalic and atraumatic.   Eyes: Conjunctivae are normal. Pupils are equal, round, and reactive to light.   Neck: No tracheal deviation present. No thyromegaly present.   Cardiovascular: Normal rate and regular rhythm.    Pulmonary/Chest: Effort normal and breath sounds normal.   Abdominal: Soft. Bowel sounds are normal. He exhibits no distension. There is no tenderness.   Musculoskeletal:   L AKA   Lymphadenopathy:     He has no cervical adenopathy.   Neurological: He is alert and oriented to person, place, and time.   Dysarthria  MMT RUE and RLE 5/5, LUE 1/5 with hand  3/5, LLE 1/5   Nursing note and vitals reviewed.      Recent Labs      09/06/17   1058  09/07/17   0516  09/08/17   0203   WBC  19.0*  14.5*  12.9*   RBC  4.29*  3.57*  3.74*   HEMOGLOBIN  11.5*  9.4*  9.9*   HEMATOCRIT  34.3*  28.6*  30.4*   MCV  80.0*  80.1*  81.3*   MCH  26.8*  26.3*  26.5*   MCHC  33.5*  32.9*  32.6*   RDW  36.1  36.4  37.4   PLATELETCT  378  273  320   MPV  9.1  9.2  9.7     Recent Labs      09/06/17   1058  09/06/17   2124  09/07/17   0516  09/08/17   0203   SODIUM  126*  128*  128*  128*   POTASSIUM  4.3   --   4.0  4.2   CHLORIDE  89*   --   95*  93*   CO2  25   --   24  25   GLUCOSE  115*   --   95  189*   BUN  28*   --   16  9   CREATININE  1.04   --   0.60  0.65   CALCIUM  9.9   --   8.9  9.2         Recent Labs      09/08/17   0203   BNPBTYPENAT  116*     Recent Labs      09/08/17   0203   TRIGLYCERIDE  147   HDL  12*   LDL  76          Assessment/Plan     * Gangrene of foot (CMS-[facility])- (present on admission)   Assessment & Plan    L AKA        PAD (peripheral artery disease) (CMS-HCC)- (present on admission)   Assessment & Plan    Resume Aggrenox once okay with Surgery        Diabetic foot ulcer (CMS-HCC)    Assessment & Plan    L AKA        Cellulitis   Assessment & Plan    IV Vancomycin, Unasyn, once blood cultures negative can likely discontinue        Dysphagia as late effect of stroke- (present on admission)   Assessment & Plan    Dysphagia 3        Anemia   Assessment & Plan    follow cbc        Hyponatremia- (present on admission)   Assessment & Plan    IVF NS, follow bmp          History of stroke- (present on admission)   Assessment & Plan    Resume Aggrenox once okay with surgery, continue Lovastatin  PT/OT        HTN (hypertension)- (present on admission)   Assessment & Plan    Atenolol, Cozaar, Norvasc, Imdur, Aldactone          DM (diabetes mellitus) (CMS-HCC)- (present on admission)   Assessment & Plan    SSI, start Lantus        Dyslipidemia- (present on admission)   Assessment & Plan    Lovastatin            Reviewed items::  Radiology images reviewed, EKG reviewed, Labs reviewed and Medications reviewed  Vaz catheter::  No Vaz  DVT prophylaxis pharmacological::  Enoxaparin (Lovenox)  Ulcer Prophylaxis::  No  Antibiotics:  Treating active infection/contamination beyond 24 hours perioperative coverage

## 2017-09-08 NOTE — PROGRESS NOTES
Received report and assumed care at 0700.  Pt A/O x 4.  PIV on R AC running NS at 50 ml/ hr; dressing CDI.  O2 NC at 2L, O2 sat 96%.  Dressing to L AKA, ABNER.  Pain 7/10, medicated per MAR.  POC discussed.  All assessment done.  All needs met at this time.  Call light within reach  Bed in lowest position  Hourly rounding in place.

## 2017-09-08 NOTE — PROGRESS NOTES
Vascular Surgery     Awake alert  Feeling OK   Pain under good control this morning     AKA stump dressing dry     Will take bandage down in couple days     Ken Osei MD

## 2017-09-08 NOTE — DIETARY
Nutrition note:  Received consult for DM diet education. Pt is incarcerated, so diet ed is NOT appropriate.

## 2017-09-08 NOTE — CONSULTS
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  Patient is very teary over the loss of his leg.  Discussed the importance of keeping FSBG  for optimal healing, including increasing protein, eliminating sugar, sleep, pain control and O2/exercise.  HbA1c= 7.0% on metformin 1000 mg twice daily and glipizide 10 mg twice daily.  He reports hypoglycemia daily before dinner, likely due to skipping lunch.  Emphasized the importance of eating lunch every day.      Reviewed basic type 2 care including nutrition, exercise, FSBG testing, medications, sick day care, foot care, preventing and treating hypoglycemia, preventing complications.  Written material provided.

## 2017-09-09 PROBLEM — E61.1 IRON DEFICIENCY: Status: ACTIVE | Noted: 2017-09-09

## 2017-09-09 PROBLEM — E61.1 IRON DEFICIENCY: Chronic | Status: ACTIVE | Noted: 2017-09-09

## 2017-09-09 LAB
ANION GAP SERPL CALC-SCNC: 9 MMOL/L (ref 0–11.9)
BASOPHILS # BLD AUTO: 0.4 % (ref 0–1.8)
BASOPHILS # BLD: 0.04 K/UL (ref 0–0.12)
BUN SERPL-MCNC: 9 MG/DL (ref 8–22)
CALCIUM SERPL-MCNC: 9.1 MG/DL (ref 8.5–10.5)
CHLORIDE SERPL-SCNC: 93 MMOL/L (ref 96–112)
CO2 SERPL-SCNC: 25 MMOL/L (ref 20–33)
CREAT SERPL-MCNC: 0.65 MG/DL (ref 0.5–1.4)
EOSINOPHIL # BLD AUTO: 0.1 K/UL (ref 0–0.51)
EOSINOPHIL NFR BLD: 0.9 % (ref 0–6.9)
ERYTHROCYTE [DISTWIDTH] IN BLOOD BY AUTOMATED COUNT: 36.1 FL (ref 35.9–50)
FOLATE SERPL-MCNC: 7.1 NG/ML
GFR SERPL CREATININE-BSD FRML MDRD: >60 ML/MIN/1.73 M 2
GLUCOSE BLD-MCNC: 184 MG/DL (ref 65–99)
GLUCOSE BLD-MCNC: 184 MG/DL (ref 65–99)
GLUCOSE BLD-MCNC: 191 MG/DL (ref 65–99)
GLUCOSE SERPL-MCNC: 176 MG/DL (ref 65–99)
HCT VFR BLD AUTO: 29.1 % (ref 42–52)
HGB BLD-MCNC: 9.6 G/DL (ref 14–18)
IMM GRANULOCYTES # BLD AUTO: 0.11 K/UL (ref 0–0.11)
IMM GRANULOCYTES NFR BLD AUTO: 1 % (ref 0–0.9)
IRON SATN MFR SERPL: 17 % (ref 15–55)
IRON SERPL-MCNC: 27 UG/DL (ref 50–180)
LYMPHOCYTES # BLD AUTO: 1.56 K/UL (ref 1–4.8)
LYMPHOCYTES NFR BLD: 14.6 % (ref 22–41)
MCH RBC QN AUTO: 26.2 PG (ref 27–33)
MCHC RBC AUTO-ENTMCNC: 33 G/DL (ref 33.7–35.3)
MCV RBC AUTO: 79.5 FL (ref 81.4–97.8)
MONOCYTES # BLD AUTO: 1.15 K/UL (ref 0–0.85)
MONOCYTES NFR BLD AUTO: 10.7 % (ref 0–13.4)
NEUTROPHILS # BLD AUTO: 7.75 K/UL (ref 1.82–7.42)
NEUTROPHILS NFR BLD: 72.4 % (ref 44–72)
NRBC # BLD AUTO: 0 K/UL
NRBC BLD AUTO-RTO: 0 /100 WBC
PLATELET # BLD AUTO: 315 K/UL (ref 164–446)
PMV BLD AUTO: 9.4 FL (ref 9–12.9)
POTASSIUM SERPL-SCNC: 3.9 MMOL/L (ref 3.6–5.5)
RBC # BLD AUTO: 3.66 M/UL (ref 4.7–6.1)
SODIUM SERPL-SCNC: 127 MMOL/L (ref 135–145)
TIBC SERPL-MCNC: 157 UG/DL (ref 250–450)
VANCOMYCIN TROUGH SERPL-MCNC: 7.2 UG/ML (ref 10–20)
VIT B12 SERPL-MCNC: 353 PG/ML (ref 211–911)
WBC # BLD AUTO: 10.7 K/UL (ref 4.8–10.8)

## 2017-09-09 PROCEDURE — 82607 VITAMIN B-12: CPT

## 2017-09-09 PROCEDURE — A9270 NON-COVERED ITEM OR SERVICE: HCPCS | Performed by: HOSPITALIST

## 2017-09-09 PROCEDURE — 770001 HCHG ROOM/CARE - MED/SURG/GYN PRIV*

## 2017-09-09 PROCEDURE — 700105 HCHG RX REV CODE 258: Performed by: HOSPITALIST

## 2017-09-09 PROCEDURE — 36415 COLL VENOUS BLD VENIPUNCTURE: CPT

## 2017-09-09 PROCEDURE — 82746 ASSAY OF FOLIC ACID SERUM: CPT

## 2017-09-09 PROCEDURE — 700102 HCHG RX REV CODE 250 W/ 637 OVERRIDE(OP): Performed by: FAMILY MEDICINE

## 2017-09-09 PROCEDURE — A9270 NON-COVERED ITEM OR SERVICE: HCPCS | Performed by: FAMILY MEDICINE

## 2017-09-09 PROCEDURE — 700111 HCHG RX REV CODE 636 W/ 250 OVERRIDE (IP): Performed by: HOSPITALIST

## 2017-09-09 PROCEDURE — 82962 GLUCOSE BLOOD TEST: CPT

## 2017-09-09 PROCEDURE — 700111 HCHG RX REV CODE 636 W/ 250 OVERRIDE (IP): Performed by: FAMILY MEDICINE

## 2017-09-09 PROCEDURE — 85025 COMPLETE CBC W/AUTO DIFF WBC: CPT

## 2017-09-09 PROCEDURE — 700111 HCHG RX REV CODE 636 W/ 250 OVERRIDE (IP)

## 2017-09-09 PROCEDURE — 700102 HCHG RX REV CODE 250 W/ 637 OVERRIDE(OP): Performed by: HOSPITALIST

## 2017-09-09 PROCEDURE — 83540 ASSAY OF IRON: CPT

## 2017-09-09 PROCEDURE — 700105 HCHG RX REV CODE 258: Performed by: FAMILY MEDICINE

## 2017-09-09 PROCEDURE — 99233 SBSQ HOSP IP/OBS HIGH 50: CPT | Performed by: FAMILY MEDICINE

## 2017-09-09 PROCEDURE — 80048 BASIC METABOLIC PNL TOTAL CA: CPT

## 2017-09-09 PROCEDURE — 700105 HCHG RX REV CODE 258

## 2017-09-09 PROCEDURE — 80202 ASSAY OF VANCOMYCIN: CPT

## 2017-09-09 PROCEDURE — 83550 IRON BINDING TEST: CPT

## 2017-09-09 RX ORDER — FERROUS SULFATE 325(65) MG
325 TABLET ORAL 2 TIMES DAILY WITH MEALS
Status: DISCONTINUED | OUTPATIENT
Start: 2017-09-09 | End: 2017-09-11 | Stop reason: HOSPADM

## 2017-09-09 RX ORDER — SODIUM CHLORIDE 1 G/1
1 TABLET ORAL
Status: DISCONTINUED | OUTPATIENT
Start: 2017-09-09 | End: 2017-09-11 | Stop reason: HOSPADM

## 2017-09-09 RX ORDER — INSULIN GLARGINE 100 [IU]/ML
15 INJECTION, SOLUTION SUBCUTANEOUS
Status: DISCONTINUED | OUTPATIENT
Start: 2017-09-10 | End: 2017-09-10

## 2017-09-09 RX ADMIN — SODIUM CHLORIDE: 9 INJECTION, SOLUTION INTRAVENOUS at 12:34

## 2017-09-09 RX ADMIN — VANCOMYCIN HYDROCHLORIDE 1800 MG: 100 INJECTION, POWDER, LYOPHILIZED, FOR SOLUTION INTRAVENOUS at 12:34

## 2017-09-09 RX ADMIN — ISOSORBIDE MONONITRATE 30 MG: 30 TABLET, EXTENDED RELEASE ORAL at 22:17

## 2017-09-09 RX ADMIN — OMEPRAZOLE 20 MG: 20 CAPSULE, DELAYED RELEASE ORAL at 22:17

## 2017-09-09 RX ADMIN — OXYCODONE HYDROCHLORIDE 15 MG: 5 TABLET ORAL at 14:11

## 2017-09-09 RX ADMIN — Medication 325 MG: at 17:52

## 2017-09-09 RX ADMIN — SODIUM CHLORIDE TAB 1 GM 1 G: 1 TAB at 17:52

## 2017-09-09 RX ADMIN — ISOSORBIDE MONONITRATE 30 MG: 30 TABLET, EXTENDED RELEASE ORAL at 08:04

## 2017-09-09 RX ADMIN — ATENOLOL 100 MG: 100 TABLET ORAL at 08:04

## 2017-09-09 RX ADMIN — AMPICILLIN SODIUM AND SULBACTAM SODIUM 3 G: 2; 1 INJECTION, POWDER, FOR SOLUTION INTRAMUSCULAR; INTRAVENOUS at 02:00

## 2017-09-09 RX ADMIN — HYDRALAZINE HYDROCHLORIDE 100 MG: 50 TABLET, FILM COATED ORAL at 08:04

## 2017-09-09 RX ADMIN — SPIRONOLACTONE 50 MG: 50 TABLET ORAL at 08:05

## 2017-09-09 RX ADMIN — INSULIN LISPRO 2 UNITS: 100 INJECTION, SOLUTION INTRAVENOUS; SUBCUTANEOUS at 06:19

## 2017-09-09 RX ADMIN — INSULIN LISPRO 2 UNITS: 100 INJECTION, SOLUTION INTRAVENOUS; SUBCUTANEOUS at 11:05

## 2017-09-09 RX ADMIN — AMPICILLIN SODIUM AND SULBACTAM SODIUM 3 G: 2; 1 INJECTION, POWDER, FOR SOLUTION INTRAMUSCULAR; INTRAVENOUS at 22:18

## 2017-09-09 RX ADMIN — HYDRALAZINE HYDROCHLORIDE 100 MG: 50 TABLET, FILM COATED ORAL at 22:17

## 2017-09-09 RX ADMIN — OXYCODONE HYDROCHLORIDE 15 MG: 5 TABLET ORAL at 22:17

## 2017-09-09 RX ADMIN — INSULIN GLARGINE 10 UNITS: 100 INJECTION, SOLUTION SUBCUTANEOUS at 06:18

## 2017-09-09 RX ADMIN — AMPICILLIN SODIUM AND SULBACTAM SODIUM 3 G: 2; 1 INJECTION, POWDER, FOR SOLUTION INTRAMUSCULAR; INTRAVENOUS at 08:00

## 2017-09-09 RX ADMIN — OXYCODONE HYDROCHLORIDE 15 MG: 5 TABLET ORAL at 05:36

## 2017-09-09 RX ADMIN — DULOXETINE HYDROCHLORIDE 30 MG: 30 CAPSULE, DELAYED RELEASE ORAL at 08:04

## 2017-09-09 RX ADMIN — AMPICILLIN SODIUM AND SULBACTAM SODIUM 3 G: 2; 1 INJECTION, POWDER, FOR SOLUTION INTRAMUSCULAR; INTRAVENOUS at 15:06

## 2017-09-09 RX ADMIN — INSULIN LISPRO 2 UNITS: 100 INJECTION, SOLUTION INTRAVENOUS; SUBCUTANEOUS at 16:36

## 2017-09-09 RX ADMIN — STANDARDIZED SENNA CONCENTRATE AND DOCUSATE SODIUM 2 TABLET: 8.6; 5 TABLET, FILM COATED ORAL at 08:05

## 2017-09-09 RX ADMIN — OXYCODONE HYDROCHLORIDE 15 MG: 5 TABLET ORAL at 09:28

## 2017-09-09 RX ADMIN — INSULIN LISPRO 2 UNITS: 100 INJECTION, SOLUTION INTRAVENOUS; SUBCUTANEOUS at 22:28

## 2017-09-09 RX ADMIN — AMLODIPINE BESYLATE 10 MG: 10 TABLET ORAL at 08:04

## 2017-09-09 RX ADMIN — ENOXAPARIN SODIUM 40 MG: 100 INJECTION SUBCUTANEOUS at 08:01

## 2017-09-09 RX ADMIN — LOSARTAN POTASSIUM 50 MG: 50 TABLET, FILM COATED ORAL at 08:04

## 2017-09-09 RX ADMIN — LOVASTATIN 50 MG: 20 TABLET ORAL at 22:17

## 2017-09-09 RX ADMIN — POLYETHYLENE GLYCOL 3350 1 PACKET: 17 POWDER, FOR SOLUTION ORAL at 12:34

## 2017-09-09 RX ADMIN — SPIRONOLACTONE 50 MG: 50 TABLET ORAL at 22:17

## 2017-09-09 ASSESSMENT — ENCOUNTER SYMPTOMS
NAUSEA: 0
DIZZINESS: 0
SHORTNESS OF BREATH: 0
CHILLS: 0
COUGH: 0
WHEEZING: 0
BLURRED VISION: 0
FEVER: 0
SORE THROAT: 0
VOMITING: 0
ABDOMINAL PAIN: 0
FOCAL WEAKNESS: 1
HEADACHES: 0
DIARRHEA: 0
HEARTBURN: 0

## 2017-09-09 ASSESSMENT — PAIN SCALES - GENERAL
PAINLEVEL_OUTOF10: 8
PAINLEVEL_OUTOF10: 2
PAINLEVEL_OUTOF10: 2
PAINLEVEL_OUTOF10: 9
PAINLEVEL_OUTOF10: 2
PAINLEVEL_OUTOF10: 4

## 2017-09-09 NOTE — PROGRESS NOTES
Received report from night RN and assumed care at 0700.  Pt alert/oriented x 4.  Pt on O2 2L via NC, O2 sat 96%.  Dressing to L AKA, ABNER.  Left side weakness.  Denies any pain at this time.   All needs met at this time; resting comfortably in bed  Call light within reach.  Bed in the lowest position.  Hourly rounding in place.

## 2017-09-09 NOTE — CARE PLAN
Problem: Safety  Goal: Will remain free from falls  Outcome: PROGRESSING AS EXPECTED  Safety precautions in place. Education on safety and falls provided to pt.

## 2017-09-09 NOTE — PROGRESS NOTES
[facility] Hospitalist Progress Note    Date of Service: 2017    Chief Complaint  62 y.o. male admitted 2017 with Gangrene, Diabetic foot ulcer, Cellulitis, PAD.    Interval Problem Update  Gangrene - AKA done, pain controlled  Diabetes - better controlled  HTN - controlled  Low Na   Low Iron    Consultants/Specialty  Vascular - Osei    Disposition  Wound care        Review of Systems   Constitutional: Negative for chills and fever.   HENT: Negative for sore throat.    Eyes: Negative for blurred vision.   Respiratory: Negative for cough, shortness of breath and wheezing.    Cardiovascular: Negative for chest pain and leg swelling.   Gastrointestinal: Negative for abdominal pain, diarrhea, heartburn, nausea and vomiting.   Genitourinary: Negative for dysuria.   Neurological: Positive for focal weakness. Negative for dizziness and headaches.      Physical Exam  Laboratory/Imaging   Hemodynamics  Temp (24hrs), Av.3 °C (99.1 °F), Min:37 °C (98.6 °F), Max:37.6 °C (99.6 °F)   Temperature: 37 °C (98.6 °F)  Pulse  Av.8  Min: 58  Max: 94    Blood Pressure: 147/66      Respiratory      Respiration: 19, Pulse Oximetry: 96 %        RUL Breath Sounds: Clear, RML Breath Sounds: Diminished, RLL Breath Sounds: Diminished, ANEL Breath Sounds: Clear, LLL Breath Sounds: Diminished    Fluids    Intake/Output Summary (Last 24 hours) at 17 1216  Last data filed at 17 0528   Gross per 24 hour   Intake              100 ml   Output              925 ml   Net             -825 ml       Nutrition  Orders Placed This Encounter   Procedures   • DIET ORDER     Standing Status:   Standing     Number of Occurrences:   1     Order Specific Question:   Diet:     Answer:   Diabetic [3]     Order Specific Question:   Texture/Fiber modifications:     Answer:   Dysphagia 3(Mechanical Soft)specify fluid consistency(question 6) [3]     Order Specific Question:   Consistency/Fluid modifications:     Answer:   Thin Liquids [3]      Physical Exam   Constitutional: He is oriented to person, place, and time. He appears well-developed and well-nourished.   HENT:   Head: Normocephalic and atraumatic.   Eyes: Conjunctivae are normal. Pupils are equal, round, and reactive to light.   Neck: No tracheal deviation present. No thyromegaly present.   Cardiovascular: Normal rate and regular rhythm.    Pulmonary/Chest: Effort normal and breath sounds normal.   Abdominal: Soft. Bowel sounds are normal. He exhibits no distension. There is no tenderness.   Musculoskeletal:   L AKA   Lymphadenopathy:     He has no cervical adenopathy.   Neurological: He is alert and oriented to person, place, and time.   Dysarthria  MMT RUE and RLE 5/5, LUE 1/5 with hand  3/5, LLE 1/5   Nursing note and vitals reviewed.      Recent Labs      09/07/17   0516  09/08/17 0203  09/09/17   0313   WBC  14.5*  12.9*  10.7   RBC  3.57*  3.74*  3.66*   HEMOGLOBIN  9.4*  9.9*  9.6*   HEMATOCRIT  28.6*  30.4*  29.1*   MCV  80.1*  81.3*  79.5*   MCH  26.3*  26.5*  26.2*   MCHC  32.9*  32.6*  33.0*   RDW  36.4  37.4  36.1   PLATELETCT  273  320  315   MPV  9.2  9.7  9.4     Recent Labs      09/07/17   0516  09/08/17   0203  09/09/17   0313   SODIUM  128*  128*  127*   POTASSIUM  4.0  4.2  3.9   CHLORIDE  95*  93*  93*   CO2  24  25  25   GLUCOSE  95  189*  176*   BUN  16  9  9   CREATININE  0.60  0.65  0.65   CALCIUM  8.9  9.2  9.1         Recent Labs      09/08/17   0203   BNPBTYPENAT  116*     Recent Labs      09/08/17   0203   TRIGLYCERIDE  147   HDL  12*   LDL  76          Assessment/Plan     * Gangrene of foot (CMS-HCC)- (present on admission)   Assessment & Plan    L AKA        PAD (peripheral artery disease) (CMS-HCC)- (present on admission)   Assessment & Plan    Resume Aggrenox once okay with Surgery        Diabetic foot ulcer (CMS-HCC)   Assessment & Plan    L AKA        Cellulitis   Assessment & Plan    IV Vancomycin, Unasyn, once blood cultures negative can likely  discontinue        Iron deficiency- (present on admission)   Assessment & Plan    Start FeSO4        Dysphagia as late effect of stroke- (present on admission)   Assessment & Plan    Dysphagia 3        Anemia   Assessment & Plan    follow cbc        Hyponatremia- (present on admission)   Assessment & Plan    Start salt tabs, follow bmp          History of stroke- (present on admission)   Assessment & Plan    Resume Aggrenox once okay with surgery, continue Lovastatin  PT/OT        HTN (hypertension)- (present on admission)   Assessment & Plan    Atenolol, Cozaar, Norvasc, Imdur, Aldactone          DM (diabetes mellitus) (CMS-HCC)- (present on admission)   Assessment & Plan    SSI, increase Lantus        Dyslipidemia- (present on admission)   Assessment & Plan    Lovastatin            Reviewed items::  Radiology images reviewed, EKG reviewed, Labs reviewed and Medications reviewed  Vaz catheter::  No Vaz  DVT prophylaxis pharmacological::  Enoxaparin (Lovenox)  Ulcer Prophylaxis::  No  Antibiotics:  Treating active infection/contamination beyond 24 hours perioperative coverage

## 2017-09-09 NOTE — PROGRESS NOTES
"Pharmacy Kinetics 62 y.o. male on vancomycin day # 4   2017    Currently on Vancomycin 1800 mg iv q24hr (1100)     Indication for Treatment: Diabetic foot ulcer/SSTI     Pertinent history per medical record: Admitted on 2017. Presents to the emergency department with foot swelling and an ulcer. 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 His foot has gotten worse. It is now red and swollen. He has neuropathy and denies having pain. He has had no odor. According to outside physician and the redness and swelling is much worse.     Other antibiotics: Ampicillin/Sulbactam 3 g IV q 6 hours     Allergies: Review of patient's allergies indicates no known allergies.      List concerns for renal function: age, obesity, severe PAD, paraplegia, hx CVA     Pertinent cultures to date:   17():PBCx2:NGTD    Recent Labs      17   1058  17   0516  17   0203  17   0313   WBC  19.0*  14.5*  12.9*  10.7   NEUTSPOLYS  82.40*  81.50*  82.40*  72.40*     Recent Labs      17   1058  17   0516  17   0203  17   0313   BUN  28*  16  9  9   CREATININE  1.04  0.60  0.65  0.65   ALBUMIN  3.5   --    --    --      Recent Labs      17   0937   VANCOTROUGH  20.3*     Intake/Output Summary (Last 24 hours) at 17 0959  Last data filed at 17 0528   Gross per 24 hour   Intake              350 ml   Output             1125 ml   Net             -775 ml      Blood pressure 147/66, pulse 94, temperature 37 °C (98.6 °F), resp. rate 19, height 1.702 m (5' 7\"), weight 99.8 kg (220 lb), SpO2 96 %. Temp (24hrs), Av.1 °C (98.8 °F), Min:36.6 °C (97.8 °F), Max:37.6 °C (99.6 °F)      A/P                  1. Vancomycin dose change: No change    2. Next vancomycin level: 17@1030  3. Goal trough: 12-16 mcg/mL  4. Comments.  Clearance poor, severe PAD,  paraplegia. AKA done yesterday , cx result unchanged (NGTD>48hr), wbc wnl. " Will follow MD notes for ABX plan, level today. Continue same for now.Consider abx de escalation when clinically warranted.     Jonh Cosby PharmD BCPS AAHIVP       9/9/2017 10:55   Vancomycin Trough 7.2 (L)     Increase vancomycin 1200 mg iv q12hr (0000 1200)    Jonh Cosby PharmD BCPS

## 2017-09-10 LAB
ANION GAP SERPL CALC-SCNC: 9 MMOL/L (ref 0–11.9)
BASOPHILS # BLD AUTO: 0.4 % (ref 0–1.8)
BASOPHILS # BLD: 0.03 K/UL (ref 0–0.12)
BUN SERPL-MCNC: 9 MG/DL (ref 8–22)
CALCIUM SERPL-MCNC: 8.9 MG/DL (ref 8.5–10.5)
CHLORIDE SERPL-SCNC: 93 MMOL/L (ref 96–112)
CO2 SERPL-SCNC: 25 MMOL/L (ref 20–33)
CREAT SERPL-MCNC: 0.61 MG/DL (ref 0.5–1.4)
EOSINOPHIL # BLD AUTO: 0.06 K/UL (ref 0–0.51)
EOSINOPHIL NFR BLD: 0.8 % (ref 0–6.9)
ERYTHROCYTE [DISTWIDTH] IN BLOOD BY AUTOMATED COUNT: 35.7 FL (ref 35.9–50)
GFR SERPL CREATININE-BSD FRML MDRD: >60 ML/MIN/1.73 M 2
GLUCOSE BLD-MCNC: 164 MG/DL (ref 65–99)
GLUCOSE BLD-MCNC: 179 MG/DL (ref 65–99)
GLUCOSE BLD-MCNC: 180 MG/DL (ref 65–99)
GLUCOSE BLD-MCNC: 187 MG/DL (ref 65–99)
GLUCOSE BLD-MCNC: 203 MG/DL (ref 65–99)
GLUCOSE SERPL-MCNC: 156 MG/DL (ref 65–99)
HCT VFR BLD AUTO: 28 % (ref 42–52)
HGB BLD-MCNC: 9.3 G/DL (ref 14–18)
IMM GRANULOCYTES # BLD AUTO: 0.08 K/UL (ref 0–0.11)
IMM GRANULOCYTES NFR BLD AUTO: 1 % (ref 0–0.9)
LYMPHOCYTES # BLD AUTO: 1.32 K/UL (ref 1–4.8)
LYMPHOCYTES NFR BLD: 17.1 % (ref 22–41)
MCH RBC QN AUTO: 26.3 PG (ref 27–33)
MCHC RBC AUTO-ENTMCNC: 33.2 G/DL (ref 33.7–35.3)
MCV RBC AUTO: 79.1 FL (ref 81.4–97.8)
MONOCYTES # BLD AUTO: 0.8 K/UL (ref 0–0.85)
MONOCYTES NFR BLD AUTO: 10.4 % (ref 0–13.4)
NEUTROPHILS # BLD AUTO: 5.43 K/UL (ref 1.82–7.42)
NEUTROPHILS NFR BLD: 70.3 % (ref 44–72)
NRBC # BLD AUTO: 0 K/UL
NRBC BLD AUTO-RTO: 0 /100 WBC
PLATELET # BLD AUTO: 274 K/UL (ref 164–446)
PMV BLD AUTO: 9.4 FL (ref 9–12.9)
POTASSIUM SERPL-SCNC: 3.8 MMOL/L (ref 3.6–5.5)
RBC # BLD AUTO: 3.54 M/UL (ref 4.7–6.1)
SODIUM SERPL-SCNC: 127 MMOL/L (ref 135–145)
WBC # BLD AUTO: 7.7 K/UL (ref 4.8–10.8)

## 2017-09-10 PROCEDURE — 80048 BASIC METABOLIC PNL TOTAL CA: CPT

## 2017-09-10 PROCEDURE — 85025 COMPLETE CBC W/AUTO DIFF WBC: CPT

## 2017-09-10 PROCEDURE — 770001 HCHG ROOM/CARE - MED/SURG/GYN PRIV*

## 2017-09-10 PROCEDURE — 700105 HCHG RX REV CODE 258

## 2017-09-10 PROCEDURE — A9270 NON-COVERED ITEM OR SERVICE: HCPCS | Performed by: FAMILY MEDICINE

## 2017-09-10 PROCEDURE — 82962 GLUCOSE BLOOD TEST: CPT

## 2017-09-10 PROCEDURE — 700111 HCHG RX REV CODE 636 W/ 250 OVERRIDE (IP): Performed by: HOSPITALIST

## 2017-09-10 PROCEDURE — 700111 HCHG RX REV CODE 636 W/ 250 OVERRIDE (IP)

## 2017-09-10 PROCEDURE — 700102 HCHG RX REV CODE 250 W/ 637 OVERRIDE(OP): Performed by: HOSPITALIST

## 2017-09-10 PROCEDURE — 700102 HCHG RX REV CODE 250 W/ 637 OVERRIDE(OP): Performed by: FAMILY MEDICINE

## 2017-09-10 PROCEDURE — 36415 COLL VENOUS BLD VENIPUNCTURE: CPT

## 2017-09-10 PROCEDURE — A9270 NON-COVERED ITEM OR SERVICE: HCPCS | Performed by: HOSPITALIST

## 2017-09-10 PROCEDURE — 99233 SBSQ HOSP IP/OBS HIGH 50: CPT | Performed by: FAMILY MEDICINE

## 2017-09-10 PROCEDURE — 700105 HCHG RX REV CODE 258: Performed by: HOSPITALIST

## 2017-09-10 PROCEDURE — 700111 HCHG RX REV CODE 636 W/ 250 OVERRIDE (IP): Performed by: FAMILY MEDICINE

## 2017-09-10 RX ORDER — INSULIN GLARGINE 100 [IU]/ML
20 INJECTION, SOLUTION SUBCUTANEOUS
Status: DISCONTINUED | OUTPATIENT
Start: 2017-09-11 | End: 2017-09-11 | Stop reason: HOSPADM

## 2017-09-10 RX ADMIN — DULOXETINE HYDROCHLORIDE 30 MG: 30 CAPSULE, DELAYED RELEASE ORAL at 08:21

## 2017-09-10 RX ADMIN — ATENOLOL 100 MG: 100 TABLET ORAL at 08:22

## 2017-09-10 RX ADMIN — ENOXAPARIN SODIUM 40 MG: 100 INJECTION SUBCUTANEOUS at 08:21

## 2017-09-10 RX ADMIN — ISOSORBIDE MONONITRATE 30 MG: 30 TABLET, EXTENDED RELEASE ORAL at 08:21

## 2017-09-10 RX ADMIN — SODIUM CHLORIDE TAB 1 GM 1 G: 1 TAB at 10:57

## 2017-09-10 RX ADMIN — INSULIN GLARGINE 15 UNITS: 100 INJECTION, SOLUTION SUBCUTANEOUS at 06:01

## 2017-09-10 RX ADMIN — LOVASTATIN 50 MG: 20 TABLET ORAL at 21:52

## 2017-09-10 RX ADMIN — OXYCODONE HYDROCHLORIDE 15 MG: 5 TABLET ORAL at 08:21

## 2017-09-10 RX ADMIN — INSULIN LISPRO 3 UNITS: 100 INJECTION, SOLUTION INTRAVENOUS; SUBCUTANEOUS at 22:00

## 2017-09-10 RX ADMIN — ISOSORBIDE MONONITRATE 30 MG: 30 TABLET, EXTENDED RELEASE ORAL at 21:53

## 2017-09-10 RX ADMIN — OXYCODONE HYDROCHLORIDE 15 MG: 5 TABLET ORAL at 21:52

## 2017-09-10 RX ADMIN — AMLODIPINE BESYLATE 10 MG: 10 TABLET ORAL at 08:22

## 2017-09-10 RX ADMIN — LOSARTAN POTASSIUM 50 MG: 50 TABLET, FILM COATED ORAL at 08:21

## 2017-09-10 RX ADMIN — OXYCODONE HYDROCHLORIDE 15 MG: 5 TABLET ORAL at 13:27

## 2017-09-10 RX ADMIN — AMPICILLIN SODIUM AND SULBACTAM SODIUM 3 G: 2; 1 INJECTION, POWDER, FOR SOLUTION INTRAMUSCULAR; INTRAVENOUS at 02:00

## 2017-09-10 RX ADMIN — Medication 325 MG: at 17:45

## 2017-09-10 RX ADMIN — OMEPRAZOLE 20 MG: 20 CAPSULE, DELAYED RELEASE ORAL at 21:53

## 2017-09-10 RX ADMIN — SODIUM CHLORIDE TAB 1 GM 1 G: 1 TAB at 08:21

## 2017-09-10 RX ADMIN — INSULIN LISPRO 2 UNITS: 100 INJECTION, SOLUTION INTRAVENOUS; SUBCUTANEOUS at 16:09

## 2017-09-10 RX ADMIN — VANCOMYCIN HYDROCHLORIDE 1200 MG: 100 INJECTION, POWDER, LYOPHILIZED, FOR SOLUTION INTRAVENOUS at 00:00

## 2017-09-10 RX ADMIN — Medication 325 MG: at 08:21

## 2017-09-10 RX ADMIN — HYDRALAZINE HYDROCHLORIDE 100 MG: 50 TABLET, FILM COATED ORAL at 21:53

## 2017-09-10 RX ADMIN — SPIRONOLACTONE 50 MG: 50 TABLET ORAL at 21:52

## 2017-09-10 RX ADMIN — INSULIN LISPRO 2 UNITS: 100 INJECTION, SOLUTION INTRAVENOUS; SUBCUTANEOUS at 05:59

## 2017-09-10 RX ADMIN — SODIUM CHLORIDE TAB 1 GM 1 G: 1 TAB at 17:45

## 2017-09-10 RX ADMIN — HYDRALAZINE HYDROCHLORIDE 100 MG: 50 TABLET, FILM COATED ORAL at 08:21

## 2017-09-10 RX ADMIN — STANDARDIZED SENNA CONCENTRATE AND DOCUSATE SODIUM 2 TABLET: 8.6; 5 TABLET, FILM COATED ORAL at 08:22

## 2017-09-10 RX ADMIN — INSULIN LISPRO 2 UNITS: 100 INJECTION, SOLUTION INTRAVENOUS; SUBCUTANEOUS at 10:58

## 2017-09-10 RX ADMIN — OXYCODONE HYDROCHLORIDE 15 MG: 5 TABLET ORAL at 03:14

## 2017-09-10 RX ADMIN — SPIRONOLACTONE 50 MG: 50 TABLET ORAL at 08:21

## 2017-09-10 RX ADMIN — OXYCODONE HYDROCHLORIDE 15 MG: 5 TABLET ORAL at 17:45

## 2017-09-10 ASSESSMENT — ENCOUNTER SYMPTOMS
FOCAL WEAKNESS: 1
COUGH: 0
HEADACHES: 0
DIARRHEA: 0
VOMITING: 0
ABDOMINAL PAIN: 0
WHEEZING: 0
FEVER: 0
SHORTNESS OF BREATH: 0
NAUSEA: 0
BLURRED VISION: 0
DIZZINESS: 0
HEARTBURN: 0
CHILLS: 0
SORE THROAT: 0

## 2017-09-10 ASSESSMENT — PAIN SCALES - GENERAL
PAINLEVEL_OUTOF10: 4
PAINLEVEL_OUTOF10: 6
PAINLEVEL_OUTOF10: 2
PAINLEVEL_OUTOF10: 8
PAINLEVEL_OUTOF10: 2
PAINLEVEL_OUTOF10: 8
PAINLEVEL_OUTOF10: 8

## 2017-09-10 NOTE — PROGRESS NOTES
Pt is AAO x4.  Pt reports a 6/10 L stump pain level.  Medicated per MAR.  VS WNL.  L sided weakness due to hx of CVA.  L stump dressing in place, CDI.  PIV patent, saline locked.  R SCD in place.  POC discussed.  All needs met at this time.  Bed in low position.  Call light within reach.  Rounding in place.

## 2017-09-10 NOTE — CARE PLAN
Problem: Venous Thromboembolism (VTW)/Deep Vein Thrombosis (DVT) Prevention:  Goal: Patient will participate in Venous Thrombosis (VTE)/Deep Vein Thrombosis (DVT)Prevention Measures  Lovenox given and SCD in place to R leg as a preventative measure.     Problem: Bowel/Gastric:  Goal: Normal bowel function is maintained or improved  Pt refusing senna; however with extensive education given by this RN, pt agreed.

## 2017-09-10 NOTE — PROGRESS NOTES
[facility] Hospitalist Progress Note    Date of Service: 9/10/2017    Chief Complaint  62 y.o. male admitted 2017 with Gangrene, Diabetic foot ulcer, Cellulitis, PAD.    Interval Problem Update  Gangrene - AKA done, pain controlled, blood cult neg  Diabetes - better controlled  HTN - controlled    Consultants/Specialty  Vascular - Osei    Disposition  Wound care        Review of Systems   Constitutional: Negative for chills and fever.   HENT: Negative for sore throat.    Eyes: Negative for blurred vision.   Respiratory: Negative for cough, shortness of breath and wheezing.    Cardiovascular: Negative for chest pain and leg swelling.   Gastrointestinal: Negative for abdominal pain, diarrhea, heartburn, nausea and vomiting.   Genitourinary: Negative for dysuria.   Neurological: Positive for focal weakness. Negative for dizziness and headaches.      Physical Exam  Laboratory/Imaging   Hemodynamics  Temp (24hrs), Av °C (98.6 °F), Min:36.6 °C (97.9 °F), Max:37.4 °C (99.4 °F)   Temperature: 36.6 °C (97.9 °F)  Pulse  Av.4  Min: 58  Max: 94    Blood Pressure: (!) 167/62      Respiratory      Respiration: 16, Pulse Oximetry: 93 %        RUL Breath Sounds: Clear, RML Breath Sounds: Diminished, RLL Breath Sounds: Diminished, ANEL Breath Sounds: Clear, LLL Breath Sounds: Diminished    Fluids    Intake/Output Summary (Last 24 hours) at 09/10/17 1325  Last data filed at 09/10/17 0500   Gross per 24 hour   Intake              930 ml   Output             1100 ml   Net             -170 ml       Nutrition  Orders Placed This Encounter   Procedures   • DIET ORDER     Standing Status:   Standing     Number of Occurrences:   1     Order Specific Question:   Diet:     Answer:   Diabetic [3]     Order Specific Question:   Texture/Fiber modifications:     Answer:   Dysphagia 3(Mechanical Soft)specify fluid consistency(question 6) [3]     Order Specific Question:   Consistency/Fluid modifications:     Answer:   Thin Liquids [3]      Physical Exam   Constitutional: He is oriented to person, place, and time. He appears well-developed and well-nourished.   HENT:   Head: Normocephalic and atraumatic.   Eyes: Conjunctivae are normal. Pupils are equal, round, and reactive to light.   Neck: No tracheal deviation present. No thyromegaly present.   Cardiovascular: Normal rate and regular rhythm.    Pulmonary/Chest: Effort normal and breath sounds normal.   Abdominal: Soft. Bowel sounds are normal. He exhibits no distension. There is no tenderness.   Musculoskeletal:   L AKA   Lymphadenopathy:     He has no cervical adenopathy.   Neurological: He is alert and oriented to person, place, and time.   Dysarthria  MMT RUE and RLE 5/5, LUE 1/5 with hand  3/5, LLE 1/5   Nursing note and vitals reviewed.      Recent Labs      09/08/17   0203  09/09/17   0313  09/10/17   0134   WBC  12.9*  10.7  7.7   RBC  3.74*  3.66*  3.54*   HEMOGLOBIN  9.9*  9.6*  9.3*   HEMATOCRIT  30.4*  29.1*  28.0*   MCV  81.3*  79.5*  79.1*   MCH  26.5*  26.2*  26.3*   MCHC  32.6*  33.0*  33.2*   RDW  37.4  36.1  35.7*   PLATELETCT  320  315  274   MPV  9.7  9.4  9.4     Recent Labs      09/08/17   0203  09/09/17   0313  09/10/17   0134   SODIUM  128*  127*  127*   POTASSIUM  4.2  3.9  3.8   CHLORIDE  93*  93*  93*   CO2  25  25  25   GLUCOSE  189*  176*  156*   BUN  9  9  9   CREATININE  0.65  0.65  0.61   CALCIUM  9.2  9.1  8.9         Recent Labs      09/08/17 0203   BNPBTYPENAT  116*     Recent Labs      09/08/17   0203   TRIGLYCERIDE  147   HDL  12*   LDL  76          Assessment/Plan     * Gangrene of foot (CMS-HCC)- (present on admission)   Assessment & Plan    L AKA        PAD (peripheral artery disease) (CMS-HCC)- (present on admission)   Assessment & Plan    Resume Aggrenox once okay with Surgery        Diabetic foot ulcer (CMS-HCC)   Assessment & Plan    L AKA        Cellulitis   Assessment & Plan    Stop IV Vancomycin, Unasyn        Iron deficiency- (present on  admission)   Assessment & Plan    FeSO4        Dysphagia as late effect of stroke- (present on admission)   Assessment & Plan    Dysphagia 3        Anemia   Assessment & Plan    follow cbc        Hyponatremia- (present on admission)   Assessment & Plan    salt tabs, follow bmp          History of stroke- (present on admission)   Assessment & Plan    Resume Aggrenox once okay with surgery, continue Lovastatin  PT/OT        HTN (hypertension)- (present on admission)   Assessment & Plan    Atenolol, Cozaar, Norvasc, Imdur, Aldactone, Hydralazine          DM (diabetes mellitus) (CMS-HCC)- (present on admission)   Assessment & Plan    SSI, increase Lantus to 20 units        Dyslipidemia- (present on admission)   Assessment & Plan    Lovastatin            Reviewed items::  Radiology images reviewed, EKG reviewed, Labs reviewed and Medications reviewed  Vaz catheter::  No Vaz  DVT prophylaxis pharmacological::  Enoxaparin (Lovenox)  Ulcer Prophylaxis::  No  Antibiotics:  Treating active infection/contamination beyond 24 hours perioperative coverage

## 2017-09-10 NOTE — CARE PLAN
Problem: Safety  Goal: Will remain free from injury  Educated on safety measures, using call light ect    Problem: Infection  Goal: Will remain free from infection    Intervention: Assess signs and symptoms of infection  Assessed for infx risk, no infx signs      Problem: Pain Management  Goal: Pain level will decrease to patient's comfort goal  Pt states pain management plan working for them, pain controlled

## 2017-09-11 VITALS
BODY MASS INDEX: 32.28 KG/M2 | HEART RATE: 57 BPM | WEIGHT: 205.69 LBS | TEMPERATURE: 97.5 F | RESPIRATION RATE: 17 BRPM | OXYGEN SATURATION: 93 % | SYSTOLIC BLOOD PRESSURE: 130 MMHG | DIASTOLIC BLOOD PRESSURE: 67 MMHG | HEIGHT: 67 IN

## 2017-09-11 LAB
ANION GAP SERPL CALC-SCNC: 9 MMOL/L (ref 0–11.9)
BASOPHILS # BLD AUTO: 0.6 % (ref 0–1.8)
BASOPHILS # BLD: 0.04 K/UL (ref 0–0.12)
BUN SERPL-MCNC: 13 MG/DL (ref 8–22)
CALCIUM SERPL-MCNC: 8.8 MG/DL (ref 8.5–10.5)
CHLORIDE SERPL-SCNC: 93 MMOL/L (ref 96–112)
CO2 SERPL-SCNC: 26 MMOL/L (ref 20–33)
CREAT SERPL-MCNC: 0.69 MG/DL (ref 0.5–1.4)
EOSINOPHIL # BLD AUTO: 0.07 K/UL (ref 0–0.51)
EOSINOPHIL NFR BLD: 1 % (ref 0–6.9)
ERYTHROCYTE [DISTWIDTH] IN BLOOD BY AUTOMATED COUNT: 35.8 FL (ref 35.9–50)
GFR SERPL CREATININE-BSD FRML MDRD: >60 ML/MIN/1.73 M 2
GLUCOSE BLD-MCNC: 173 MG/DL (ref 65–99)
GLUCOSE BLD-MCNC: 178 MG/DL (ref 65–99)
GLUCOSE BLD-MCNC: 187 MG/DL (ref 65–99)
GLUCOSE SERPL-MCNC: 168 MG/DL (ref 65–99)
HCT VFR BLD AUTO: 29.7 % (ref 42–52)
HGB BLD-MCNC: 9.9 G/DL (ref 14–18)
IMM GRANULOCYTES # BLD AUTO: 0.12 K/UL (ref 0–0.11)
IMM GRANULOCYTES NFR BLD AUTO: 1.7 % (ref 0–0.9)
LYMPHOCYTES # BLD AUTO: 1.35 K/UL (ref 1–4.8)
LYMPHOCYTES NFR BLD: 19.1 % (ref 22–41)
MCH RBC QN AUTO: 26.7 PG (ref 27–33)
MCHC RBC AUTO-ENTMCNC: 33.3 G/DL (ref 33.7–35.3)
MCV RBC AUTO: 80.1 FL (ref 81.4–97.8)
MONOCYTES # BLD AUTO: 0.89 K/UL (ref 0–0.85)
MONOCYTES NFR BLD AUTO: 12.6 % (ref 0–13.4)
NEUTROPHILS # BLD AUTO: 4.61 K/UL (ref 1.82–7.42)
NEUTROPHILS NFR BLD: 65 % (ref 44–72)
NRBC # BLD AUTO: 0 K/UL
NRBC BLD AUTO-RTO: 0 /100 WBC
PLATELET # BLD AUTO: 287 K/UL (ref 164–446)
PMV BLD AUTO: 9.8 FL (ref 9–12.9)
POTASSIUM SERPL-SCNC: 3.8 MMOL/L (ref 3.6–5.5)
RBC # BLD AUTO: 3.71 M/UL (ref 4.7–6.1)
SODIUM SERPL-SCNC: 128 MMOL/L (ref 135–145)
WBC # BLD AUTO: 7.1 K/UL (ref 4.8–10.8)

## 2017-09-11 PROCEDURE — 85025 COMPLETE CBC W/AUTO DIFF WBC: CPT

## 2017-09-11 PROCEDURE — 700102 HCHG RX REV CODE 250 W/ 637 OVERRIDE(OP): Performed by: HOSPITALIST

## 2017-09-11 PROCEDURE — 80048 BASIC METABOLIC PNL TOTAL CA: CPT

## 2017-09-11 PROCEDURE — A9270 NON-COVERED ITEM OR SERVICE: HCPCS | Performed by: HOSPITALIST

## 2017-09-11 PROCEDURE — 82962 GLUCOSE BLOOD TEST: CPT | Mod: 91

## 2017-09-11 PROCEDURE — 700111 HCHG RX REV CODE 636 W/ 250 OVERRIDE (IP): Performed by: FAMILY MEDICINE

## 2017-09-11 PROCEDURE — 36415 COLL VENOUS BLD VENIPUNCTURE: CPT

## 2017-09-11 PROCEDURE — A9270 NON-COVERED ITEM OR SERVICE: HCPCS | Performed by: FAMILY MEDICINE

## 2017-09-11 PROCEDURE — 700102 HCHG RX REV CODE 250 W/ 637 OVERRIDE(OP): Performed by: FAMILY MEDICINE

## 2017-09-11 PROCEDURE — 99239 HOSP IP/OBS DSCHRG MGMT >30: CPT | Performed by: FAMILY MEDICINE

## 2017-09-11 RX ORDER — SODIUM CHLORIDE 1 G/1
1 TABLET ORAL
Qty: 90 TAB | Refills: 1 | Status: ON HOLD | OUTPATIENT
Start: 2017-09-11 | End: 2020-04-09

## 2017-09-11 RX ORDER — INSULIN GLARGINE 100 [IU]/ML
20 INJECTION, SOLUTION SUBCUTANEOUS EVERY MORNING
Qty: 1 VIAL | Refills: 1 | Status: ON HOLD | OUTPATIENT
Start: 2017-09-11 | End: 2020-04-09

## 2017-09-11 RX ORDER — FERROUS SULFATE 325(65) MG
325 TABLET ORAL 2 TIMES DAILY WITH MEALS
Qty: 60 TAB | Refills: 1 | Status: ON HOLD | OUTPATIENT
Start: 2017-09-11 | End: 2020-04-09

## 2017-09-11 RX ADMIN — INSULIN LISPRO 2 UNITS: 100 INJECTION, SOLUTION INTRAVENOUS; SUBCUTANEOUS at 06:06

## 2017-09-11 RX ADMIN — STANDARDIZED SENNA CONCENTRATE AND DOCUSATE SODIUM 2 TABLET: 8.6; 5 TABLET, FILM COATED ORAL at 09:00

## 2017-09-11 RX ADMIN — ATENOLOL 100 MG: 100 TABLET ORAL at 08:58

## 2017-09-11 RX ADMIN — SODIUM CHLORIDE TAB 1 GM 1 G: 1 TAB at 16:53

## 2017-09-11 RX ADMIN — LOSARTAN POTASSIUM 50 MG: 50 TABLET, FILM COATED ORAL at 09:00

## 2017-09-11 RX ADMIN — OXYCODONE HYDROCHLORIDE 15 MG: 5 TABLET ORAL at 18:48

## 2017-09-11 RX ADMIN — SODIUM CHLORIDE TAB 1 GM 1 G: 1 TAB at 08:13

## 2017-09-11 RX ADMIN — INSULIN LISPRO 2 UNITS: 100 INJECTION, SOLUTION INTRAVENOUS; SUBCUTANEOUS at 16:55

## 2017-09-11 RX ADMIN — SODIUM CHLORIDE TAB 1 GM 1 G: 1 TAB at 11:44

## 2017-09-11 RX ADMIN — ISOSORBIDE MONONITRATE 30 MG: 30 TABLET, EXTENDED RELEASE ORAL at 08:59

## 2017-09-11 RX ADMIN — SPIRONOLACTONE 50 MG: 50 TABLET ORAL at 09:01

## 2017-09-11 RX ADMIN — OXYCODONE HYDROCHLORIDE 15 MG: 5 TABLET ORAL at 10:58

## 2017-09-11 RX ADMIN — ENOXAPARIN SODIUM 40 MG: 100 INJECTION SUBCUTANEOUS at 08:58

## 2017-09-11 RX ADMIN — DULOXETINE HYDROCHLORIDE 30 MG: 30 CAPSULE, DELAYED RELEASE ORAL at 08:58

## 2017-09-11 RX ADMIN — INSULIN GLARGINE 20 UNITS: 100 INJECTION, SOLUTION SUBCUTANEOUS at 06:07

## 2017-09-11 RX ADMIN — INSULIN LISPRO 2 UNITS: 100 INJECTION, SOLUTION INTRAVENOUS; SUBCUTANEOUS at 11:48

## 2017-09-11 RX ADMIN — HYDRALAZINE HYDROCHLORIDE 100 MG: 50 TABLET, FILM COATED ORAL at 08:59

## 2017-09-11 RX ADMIN — OXYCODONE HYDROCHLORIDE 15 MG: 5 TABLET ORAL at 03:46

## 2017-09-11 RX ADMIN — Medication 325 MG: at 08:13

## 2017-09-11 RX ADMIN — AMLODIPINE BESYLATE 10 MG: 10 TABLET ORAL at 08:57

## 2017-09-11 RX ADMIN — Medication 325 MG: at 16:53

## 2017-09-11 ASSESSMENT — PAIN SCALES - GENERAL: PAINLEVEL_OUTOF10: 8

## 2017-09-11 NOTE — CARE PLAN
Problem: Venous Thromboembolism (VTW)/Deep Vein Thrombosis (DVT) Prevention:  Goal: Patient will participate in Venous Thrombosis (VTE)/Deep Vein Thrombosis (DVT)Prevention Measures  Outcome: PROGRESSING AS EXPECTED  SCDs on.    Problem: Respiratory:  Goal: Respiratory status will improve  Outcome: PROGRESSING AS EXPECTED  IS every hour. Needs coaching.    Problem: Skin Integrity  Goal: Risk for impaired skin integrity will decrease  Outcome: PROGRESSING AS EXPECTED      Problem: Pain Management  Goal: Pain level will decrease to patient's comfort goal  Outcome: PROGRESSING AS EXPECTED  Oxy 15 for pain. Pain controlled.

## 2017-09-11 NOTE — PROGRESS NOTES
Vascular Surgery     AKA stump looks good   Dressing removed today     Change bandage over incision every other day     OK to disposition from surgery standpoint     Follow-up 2 weeks my office

## 2017-09-11 NOTE — DISCHARGE PLANNING
Medical Social Work    SW called [facility] at [phone]. [facility] requesting that attending nurse call to provide reports and that a discharge summary be completed and transported with pt. Per attending nurse, transport arranged for this evening. SW notified attending nurse.

## 2017-09-11 NOTE — PROGRESS NOTES
LIMB PRESERVATION SERVICE     62-year-old male, prisoner from [facility], admitted for gangrenous left 4th toe and left great toe.  The wound on the 4th toe started as a blister about 3 weeks ago.  The great toe wound started after he was given a new pair of shoes recently. He is nonambulatory due to a series of CVAs.  He uses a wheelchair for mobility.     POD # 4 s/p Left AKA by Dr. Sea gu CDI  Dr. Osei to change today    Completed abx  DM educator has seen pt, recommendations provided.  a1c  7%      Reviewed importance of checking right foot daily, avoid pressure to heels when in bed and to wear protective shoe when in WC.         PLAN  Wound care per surgeon  Recommend compressive bandage/ tubigrip to control edema  Nursing to float R heel     D/C plan: return to [facility]    LPS to sign off, please reconsult if needs arise

## 2017-09-11 NOTE — DISCHARGE SUMMARY
Hospital Medicine Discharge Note     Admit Date:  9/6/2017       Discharge Date:   9/11/2017    Attending Physician:  Topher Rosas     Diagnoses (includes active and resolved):   Principal Problem:    Gangrene of foot (CMS-HCC) POA: Yes  Active Problems:    Cellulitis POA: Unknown    Diabetic foot ulcer (CMS-HCC) POA: Unknown    PAD (peripheral artery disease) (CMS-HCC) POA: Yes    DM (diabetes mellitus) (CMS-HCC) POA: Yes    HTN (hypertension) POA: Yes    History of stroke POA: Yes    Hyponatremia POA: Yes    Anemia POA: Unknown    Dysphagia as late effect of stroke POA: Yes    Iron deficiency (Chronic) POA: Yes    Dyslipidemia POA: Yes  Resolved Problems:    * No resolved hospital problems. *        Hospital Summary (Brief Narrative):       62-year-old white male admitted for left foot ulcer with gangrene, he has known history of stroke with residual left-sided weakness. Vascular surgery consult was consulted and recommended a left above-knee amputation. Surgery was done, patient tolerated the procedure well, pain was well controlled. Initially he was placed on empiric IV antibiotics as cultures later proved to be negative and antibiotics were discontinued. His diabetes was not well controlled and he was started on Lantus. Vascular surgery has cleared him for discharge.     Consultants:      Vascular surgery - Ken Osei M.D.     Procedures:        Left above knee amputation        Discharge Medications:        Medication Reconciliation Completed     Medication List      START taking these medications      Instructions   ferrous sulfate 325 (65 Fe) MG tablet   Take 1 Tab by mouth 2 times a day, with meals.  Dose:  325 mg     insulin glargine 100 UNIT/ML Soln  Commonly known as:  LANTUS   Inject 20 Units as instructed every morning.  Dose:  20 Units     sodium chloride 1 GM Tabs  Commonly known as:  SALT   Take 1 Tab by mouth 3 times a day, with meals.  Dose:  1 g        CONTINUE taking these medications       Instructions   amlodipine 5 MG Tabs  Commonly known as:  NORVASC   Take 10 mg by mouth every day.  Dose:  10 mg     aspirin-dipyridamole  MG Cp12  Commonly known as:  AGGRENOX   Take 1 Cap by mouth 2 times a day.  Dose:  1 Cap     atenolol 100 MG Tabs  Commonly known as:  TENORMIN   Take 100 mg by mouth every day.  Dose:  100 mg     duloxetine 30 MG Cpep  Commonly known as:  CYMBALTA   Take 30 mg by mouth every day.  Dose:  30 mg     hydrALAZINE 50 MG Tabs  Commonly known as:  APRESOLINE   Take 100 mg by mouth 2 Times a Day.  Dose:  100 mg     isosorbide mononitrate SR 30 MG Tb24  Commonly known as:  IMDUR   Take 30 mg by mouth 2 Times a Day.  Dose:  30 mg     losartan 50 MG Tabs  Commonly known as:  COZAAR   Take 50 mg by mouth every day.  Dose:  50 mg     lovastatin 40 MG tablet  Commonly known as:  MEVACOR   Take 50 mg by mouth every evening.  Dose:  50 mg     metformin 1000 MG tablet  Commonly known as:  GLUCOPHAGE   Take 1,000 mg by mouth 2 times a day, with meals.  Dose:  1000 mg     NUEDEXTA 20-10 MG Caps  Generic drug:  Dextromethorphan-Quinidine   Take 1 Tab by mouth 2 Times a Day.  Dose:  1 Tab     omeprazole 20 MG tablet  Commonly known as:  PRILOSEC OTC   Take 20 mg by mouth every day.  Dose:  20 mg     oxycodone 15 MG immediate release tablet  Commonly known as:  OXY-IR   Take 15 mg by mouth every four hours as needed for Severe Pain.  Dose:  15 mg     spironolactone 50 MG Tabs  Commonly known as:  ALDACTONE   Take 50 mg by mouth 2 times a day.  Dose:  50 mg        STOP taking these medications    furosemide 40 MG Tabs  Commonly known as:  LASIX     glipiZIDE 5 MG Tabs  Commonly known as:  GLUCOTROL              Disposition:  Transferred to facility    Diet:   Diabetic, dysphagia 3    Activity:   As tolerated    Code status:  Full    Primary Care Provider:    Pcp Pt States None    Follow up appointment details :        Ken Osei M.D.  [address] [place] [address] [id number]  [address]  [facility]  [id number] [phone]    In 2 weeks      No future appointments.    Pending Studies:        None    Time spent on discharge day patient visit: 35 minutes    #################################################      Most Recent Labs:    Lab Results   Component Value Date/Time    WBC 7.1 09/11/2017 03:44 AM    RBC 3.71 (L) 09/11/2017 03:44 AM    HEMOGLOBIN 9.9 (L) 09/11/2017 03:44 AM    HEMATOCRIT 29.7 (L) 09/11/2017 03:44 AM    MCV 80.1 (L) 09/11/2017 03:44 AM    MCH 26.7 (L) 09/11/2017 03:44 AM    MCHC 33.3 (L) 09/11/2017 03:44 AM    MPV 9.8 09/11/2017 03:44 AM    NEUTSPOLYS 65.00 09/11/2017 03:44 AM    LYMPHOCYTES 19.10 (L) 09/11/2017 03:44 AM    MONOCYTES 12.60 09/11/2017 03:44 AM    EOSINOPHILS 1.00 09/11/2017 03:44 AM    BASOPHILS 0.60 09/11/2017 03:44 AM      Lab Results   Component Value Date/Time    SODIUM 128 (L) 09/11/2017 03:44 AM    POTASSIUM 3.8 09/11/2017 03:44 AM    CHLORIDE 93 (L) 09/11/2017 03:44 AM    CO2 26 09/11/2017 03:44 AM    GLUCOSE 168 (H) 09/11/2017 03:44 AM    BUN 13 09/11/2017 03:44 AM    CREATININE 0.69 09/11/2017 03:44 AM      Lab Results   Component Value Date/Time    ALTSGPT 21 09/06/2017 10:58 AM    ASTSGOT 30 09/06/2017 10:58 AM    ALKPHOSPHAT 72 09/06/2017 10:58 AM    TBILIRUBIN 0.4 09/06/2017 10:58 AM    ALBUMIN 3.5 09/06/2017 10:58 AM    GLOBULIN 4.7 (H) 09/06/2017 10:58 AM    PREALBUMIN 9.0 (L) 09/06/2017 10:58 AM     No results found for: PROTHROMBTM, INR     Imaging/ Testing:      ECHOCARDIOGRAM COMP W/O CONT   Final Result      LE ART DUPLX/IMAG (Specify in Comments Left, Right Or Bilateral)   Final Result      LE ART/CORDELIA (Specify in Comments Left, Right Or Bilateral)   Final Result      DX-FOOT-COMPLETE 3+ LEFT   Final Result      1.  There is diffuse swelling with gas in the plantar soft tissues in the distal metatarsal regions. There is no periostitis to suggest osteomyelitis.   2.  There is extensive atherosclerotic change throughout the vessels of the foot and ankle.   3.   There is degenerative change as described above.      DX-CHEST-PORTABLE (1 VIEW)   Final Result      1.  There is mild peribronchial wall thickening suggesting possible vascular congestion or edema.   2.  Right hilar opacity is likely due to atelectasis.          Instructions:      The patient was instructed to return to the ER in the event of worsening symptoms. I have counseled the patient on the importance of compliance and the patient has agreed to proceed with all medical recommendations and follow up plan indicated above.   The patient understands that all medications come with benefits and risks. Risks may include permanent injury or death and these risks can be minimized with close reassessment and monitoring.

## 2017-09-11 NOTE — PROGRESS NOTES
Aox4. Pain on left stump controlled with oxy 15. Denies SOB. Diminished lung sounds. Reinforced IS. Needs coaching only able to pull 500. Left sided weakness. BM last night. Drinking and voiding. Dressing to left stump clean, dry, and intact. Pt slept throughout the night. care home guards at bedside.

## 2017-09-12 LAB
BACTERIA BLD CULT: NORMAL
BACTERIA BLD CULT: NORMAL
SIGNIFICANT IND 70042: NORMAL
SIGNIFICANT IND 70042: NORMAL
SITE SITE: NORMAL
SITE SITE: NORMAL
SOURCE SOURCE: NORMAL
SOURCE SOURCE: NORMAL

## 2017-09-12 NOTE — PROGRESS NOTES
Patient's IV removed and discharge instructions given. Patient to be discharged back to [facility] with two guards via [facility] van when arrives.

## 2017-09-12 NOTE — DISCHARGE INSTRUCTIONS
Discharge Instructions    Discharged to other by medical transportation with escort. Discharged via wheelchair, hospital escort: Yes.  Special equipment needed: Not Applicable    Be sure to schedule a follow-up appointment with your primary care doctor or any specialists as instructed.     Discharge Plan:   Diet Plan: Discussed  Activity Level: Discussed  Confirmed Follow up Appointment: No (Comments)  Confirmed Symptoms Management: Discussed  Medication Reconciliation Updated: Yes  Influenza Vaccine Indication: Not indicated: Previously immunized this influenza season and > 8 years of age    I understand that a diet low in cholesterol, fat, and sodium is recommended for good health. Unless I have been given specific instructions below for another diet, I accept this instruction as my diet prescription.   Other diet: diabetic    Special Instructions: None    · Is patient discharged on Warfarin / Coumadin?   No     · Is patient Post Blood Transfusion?  NoLiving With an Amputation  The most common causes of amputation from the hip down include:  · Diseases that:  ¨ Reduce the blood flow to an area of your body.  ¨ Decrease your body's ability to fight infection.  · Traumatic injuries that cause significant damage to body tissues.  · Birth defects.  · Cancerous lumps (malignant tumors).  Amputation above the hip is usually the result of trauma or birth defect. Disease is a less common cause.  Living with an amputation can be challenging, but you can still live a long, productive life.  WHAT ARE THE COMMON CHALLENGES OF LIVING WITH AN AMPUTATION?  The most common challenges are mobility and self-care. With some new habits, though, it is often possible to do all of the activities you used to do.  You may be able to use a device that substitutes for your limb (prosthesis). The prosthesis helps you adapt more quickly to these challenges. Your health care provider can help select a prosthesis to meet your needs. A person  who helps you choose and fits you with a prosthesis (prosthetist) may also help.  A rehabilitation program can also help you gain mobility and self-reliance. Your rehabilitation team may include:  · Physicians.  · Physical and occupational therapists.  · Prosthetists.  · Nurses.  · Social workers.  · Psychologists.  · Dietitians.  Your rehabilitation team will help you with all aspects of recovery and returning to work, home, sports, and your community. You will learn to:  · Get around safely.  · Adjust your home.  · Exercise.  · Use a prosthetic.  · Work through emotional challenges.  · Connect with other people who have gone through the same experience.  Additional challenges may include:  · Grieving period.  · Body image issues.  · Lifestyle issues, such as sex.  · Maintaining a healthy weight.  These issues are normal. Discuss these with your rehabilitation team.  WHEN CAN I RETURN TO MY REGULAR ACTIVITIES?   Returning to your normal activities is part of healing. Changes can often be made to equipment that allow you to return to a sport or hobby. Some [employer] design special equipment for this. Discuss all of your leisure interests with your health care provider and prosthetist.  WHEN CAN I RETURN TO WORK?  When you are ready to return to work, your therapists can perform job site evaluations and make recommendations to help you perform your job. You may not be able to return to your same job. Your local Office of Vocational Rehabilitation can assist you in job retraining.  FOR MORE INFORMATION:  Visit these online resources. You can find tips on everything from getting dressed and using bathrooms to driving and travel considerations. These resources can also connect you to a network of emotional support, activities, and innovations. You may also search the Internet to find a local support group.  · Amputee Coalition:  http://www.amputee-coalition.org/ensuring-fall-safety/http://www.amputee-coalition.org/ensuring-fall-safety/  · Amputee Support Groups: http://amputee.supportgroups.com/http://amputee.supportgroups.com  · Daily Strength: http://www.dailystrenRocawear.org/c/Amputees/support-grouphttp://www.dailystStardoll.org/c/Amputees/support-group  · National Amputee Foundation: http://www.nationalamputation.org/http://www.nationalamputation.org  · National Center on Health, Physical Activity and Disability: http://www.nchpad.org/http://www.nchpad.org  · Disabled Sports USA: http://www.disabledsportsusa.orghttp://www.disabledsportsusa.org  · American Academy of Orthotists and Prosthetists: http://www.oandp.org/http://www.oandp.org     This information is not intended to replace advice given to you by your health care provider. Make sure you discuss any questions you have with your health care provider.     Document Released: 09/09/2003 Document Revised: 01/08/2016 Document Reviewed: 05/05/2015  [employer] Interactive Patient Education ©2016 [employer]      Depression / Suicide Risk    As you are discharged from this [facility] facility, it is important to learn how to keep safe from harming yourself.    Recognize the warning signs:  · Abrupt changes in personality, positive or negative- including increase in energy   · Giving away possessions  · Change in eating patterns- significant weight changes-  positive or negative  · Change in sleeping patterns- unable to sleep or sleeping all the time   · Unwillingness or inability to communicate  · Depression  · Unusual sadness, discouragement and loneliness  · Talk of wanting to die  · Neglect of personal appearance   · Rebelliousness- reckless behavior  · Withdrawal from people/activities they love  · Confusion- inability to concentrate     If you or a loved one observes any of these behaviors or has concerns about self-harm, here's what you can do:  · Talk about it- your feelings and  reasons for harming yourself  · Remove any means that you might use to hurt yourself (examples: pills, rope, extension cords, firearm)  · Get professional help from the community (Mental Health, Substance Abuse, psychological counseling)  · Do not be alone:Call your Safe Contact- someone whom you trust who will be there for you.  · Call your local CRISIS HOTLINE 848-3817 or 648-335-4254  · Call your local Children's Mobile Crisis Response Team [place] (904) 137-4494 or www.Kili  · Call the toll free National Suicide Prevention Hotlines   · National Suicide Prevention Lifeline 056-634-LPJA (4840)  · National Hope Line Network 800-SUICIDE (344-4565)

## 2017-09-16 NOTE — DOCUMENTATION QUERY
DOCUMENTATION QUERY    PROVIDERS: Please select “Cosign w/ note”to reply to query.    To better represent the severity of illness of your patient, please review the following information and exercise your independent professional judgment in responding to this query.     Left above knee amputation is documented in operative report.  ICD-10-PCS codes are very specific to what area of the bone was amputated.  Based upon the clinical findings, risk factors, and treatment, can this procedure be further specified?      • Left above knee amputation high (proximal portion of femur shaft )  • Left above knee amputation mid (middle portion of femur shaft   • Left above knee amputation low (distal portion of femur shaft )    The medical record reflects the following:   Clinical Findings  PVD with gangrene, diabetic foot ulcers   Treatment  AKA    Risk Factors  Diabetic foot ulcers, PVD with gangrene   Location within medical record  Operative report     Thank you,   OLY Stevens

## 2017-09-20 NOTE — DOCUMENTATION QUERY
PROVIDERS: Please select “Cosign w/ note”to reply to query.     To better represent the severity of illness of your patient, please review the following information and exercise your independent professional judgment in responding to this query.      Left above knee amputation is documented in operative report.  ICD-10-PCS codes are very specific to what area of the bone was amputated.  Based upon the clinical findings, risk factors, and treatment, can this procedure be further specified?        · Left above knee amputation high (proximal portion of femur shaft )  · Left above knee amputation mid (middle portion of femur shaft   · Left above knee amputation low (distal portion of femur shaft )     The medical record reflects the following:   Clinical Findings  PVD with gangrene, diabetic foot ulcers   Treatment  AKA    Risk Factors  Diabetic foot ulcers, PVD with gangrene   Location within medical record  Operative report      Thank you,   PREMA Rios, OLY    High amputation through proximal femur

## 2020-04-09 ENCOUNTER — HOSPITAL ENCOUNTER (INPATIENT)
Facility: MEDICAL CENTER | Age: 65
LOS: 12 days | DRG: 617 | End: 2020-04-21
Attending: INTERNAL MEDICINE | Admitting: INTERNAL MEDICINE
Payer: MEDICAID

## 2020-04-09 ENCOUNTER — HOSPITAL ENCOUNTER (OUTPATIENT)
Facility: MEDICAL CENTER | Age: 65
DRG: 617 | End: 2020-04-09
Admitting: INTERNAL MEDICINE
Payer: MEDICAID

## 2020-04-09 DIAGNOSIS — Z89.611 S/P AKA (ABOVE KNEE AMPUTATION), RIGHT (HCC): ICD-10-CM

## 2020-04-09 LAB — GLUCOSE BLD-MCNC: 113 MG/DL (ref 65–99)

## 2020-04-09 PROCEDURE — 82962 GLUCOSE BLOOD TEST: CPT

## 2020-04-09 PROCEDURE — 87205 SMEAR GRAM STAIN: CPT

## 2020-04-09 PROCEDURE — 87070 CULTURE OTHR SPECIMN AEROBIC: CPT

## 2020-04-09 PROCEDURE — 700111 HCHG RX REV CODE 636 W/ 250 OVERRIDE (IP): Performed by: INTERNAL MEDICINE

## 2020-04-09 PROCEDURE — 700102 HCHG RX REV CODE 250 W/ 637 OVERRIDE(OP): Performed by: INTERNAL MEDICINE

## 2020-04-09 PROCEDURE — 87077 CULTURE AEROBIC IDENTIFY: CPT

## 2020-04-09 PROCEDURE — 99223 1ST HOSP IP/OBS HIGH 75: CPT | Performed by: INTERNAL MEDICINE

## 2020-04-09 PROCEDURE — 770001 HCHG ROOM/CARE - MED/SURG/GYN PRIV*

## 2020-04-09 PROCEDURE — 700105 HCHG RX REV CODE 258: Performed by: INTERNAL MEDICINE

## 2020-04-09 PROCEDURE — 87186 SC STD MICRODIL/AGAR DIL: CPT

## 2020-04-09 PROCEDURE — A9270 NON-COVERED ITEM OR SERVICE: HCPCS | Performed by: INTERNAL MEDICINE

## 2020-04-09 RX ORDER — ONDANSETRON 2 MG/ML
4 INJECTION INTRAMUSCULAR; INTRAVENOUS EVERY 4 HOURS PRN
Status: DISCONTINUED | OUTPATIENT
Start: 2020-04-09 | End: 2020-04-21 | Stop reason: HOSPADM

## 2020-04-09 RX ORDER — PROMETHAZINE HYDROCHLORIDE 25 MG/1
12.5-25 SUPPOSITORY RECTAL EVERY 4 HOURS PRN
Status: DISCONTINUED | OUTPATIENT
Start: 2020-04-09 | End: 2020-04-19

## 2020-04-09 RX ORDER — MORPHINE SULFATE 4 MG/ML
1-2 INJECTION, SOLUTION INTRAMUSCULAR; INTRAVENOUS EVERY 4 HOURS PRN
Status: DISCONTINUED | OUTPATIENT
Start: 2020-04-09 | End: 2020-04-15

## 2020-04-09 RX ORDER — GLIPIZIDE 10 MG/1
10 TABLET ORAL 2 TIMES DAILY
COMMUNITY

## 2020-04-09 RX ORDER — AMLODIPINE BESYLATE 10 MG/1
10 TABLET ORAL DAILY
Status: DISCONTINUED | OUTPATIENT
Start: 2020-04-10 | End: 2020-04-21 | Stop reason: HOSPADM

## 2020-04-09 RX ORDER — ACETAMINOPHEN 325 MG/1
650 TABLET ORAL EVERY 6 HOURS PRN
Status: DISCONTINUED | OUTPATIENT
Start: 2020-04-09 | End: 2020-04-21 | Stop reason: HOSPADM

## 2020-04-09 RX ORDER — SPIRONOLACTONE 50 MG/1
50 TABLET, FILM COATED ORAL 2 TIMES DAILY
Status: DISCONTINUED | OUTPATIENT
Start: 2020-04-09 | End: 2020-04-21 | Stop reason: HOSPADM

## 2020-04-09 RX ORDER — PROMETHAZINE HYDROCHLORIDE 25 MG/1
12.5-25 TABLET ORAL EVERY 4 HOURS PRN
Status: DISCONTINUED | OUTPATIENT
Start: 2020-04-09 | End: 2020-04-19

## 2020-04-09 RX ORDER — BISACODYL 10 MG
10 SUPPOSITORY, RECTAL RECTAL
Status: DISCONTINUED | OUTPATIENT
Start: 2020-04-09 | End: 2020-04-21 | Stop reason: HOSPADM

## 2020-04-09 RX ORDER — OXYCODONE HYDROCHLORIDE 5 MG/1
5-10 TABLET ORAL EVERY 4 HOURS PRN
Status: DISCONTINUED | OUTPATIENT
Start: 2020-04-09 | End: 2020-04-10

## 2020-04-09 RX ORDER — ONDANSETRON 4 MG/1
4 TABLET, ORALLY DISINTEGRATING ORAL EVERY 4 HOURS PRN
Status: DISCONTINUED | OUTPATIENT
Start: 2020-04-09 | End: 2020-04-21 | Stop reason: HOSPADM

## 2020-04-09 RX ORDER — HYDRALAZINE HYDROCHLORIDE 25 MG/1
100 TABLET, FILM COATED ORAL 2 TIMES DAILY
Status: DISCONTINUED | OUTPATIENT
Start: 2020-04-10 | End: 2020-04-21 | Stop reason: HOSPADM

## 2020-04-09 RX ORDER — ISOSORBIDE MONONITRATE 30 MG/1
30 TABLET, EXTENDED RELEASE ORAL 2 TIMES DAILY
Status: DISCONTINUED | OUTPATIENT
Start: 2020-04-09 | End: 2020-04-21 | Stop reason: HOSPADM

## 2020-04-09 RX ORDER — POLYETHYLENE GLYCOL 3350 17 G/17G
1 POWDER, FOR SOLUTION ORAL
Status: DISCONTINUED | OUTPATIENT
Start: 2020-04-09 | End: 2020-04-21 | Stop reason: HOSPADM

## 2020-04-09 RX ORDER — AMOXICILLIN 250 MG
2 CAPSULE ORAL 2 TIMES DAILY
Status: DISCONTINUED | OUTPATIENT
Start: 2020-04-09 | End: 2020-04-21 | Stop reason: HOSPADM

## 2020-04-09 RX ORDER — AMITRIPTYLINE HYDROCHLORIDE 50 MG/1
50 TABLET, FILM COATED ORAL NIGHTLY
COMMUNITY

## 2020-04-09 RX ORDER — PROCHLORPERAZINE EDISYLATE 5 MG/ML
5-10 INJECTION INTRAMUSCULAR; INTRAVENOUS EVERY 4 HOURS PRN
Status: DISCONTINUED | OUTPATIENT
Start: 2020-04-09 | End: 2020-04-19

## 2020-04-09 RX ORDER — OMEPRAZOLE 20 MG/1
20 CAPSULE, DELAYED RELEASE ORAL DAILY
Status: DISCONTINUED | OUTPATIENT
Start: 2020-04-10 | End: 2020-04-21 | Stop reason: HOSPADM

## 2020-04-09 RX ORDER — SODIUM CHLORIDE 9 MG/ML
INJECTION, SOLUTION INTRAVENOUS CONTINUOUS
Status: DISCONTINUED | OUTPATIENT
Start: 2020-04-09 | End: 2020-04-14

## 2020-04-09 RX ORDER — ATENOLOL 50 MG/1
100 TABLET ORAL DAILY
Status: DISCONTINUED | OUTPATIENT
Start: 2020-04-10 | End: 2020-04-21 | Stop reason: HOSPADM

## 2020-04-09 RX ORDER — AMITRIPTYLINE HYDROCHLORIDE 25 MG/1
50 TABLET, FILM COATED ORAL NIGHTLY
Status: DISCONTINUED | OUTPATIENT
Start: 2020-04-10 | End: 2020-04-21 | Stop reason: HOSPADM

## 2020-04-09 RX ORDER — OMEPRAZOLE 20 MG/1
20 TABLET, DELAYED RELEASE ORAL DAILY
Status: DISCONTINUED | OUTPATIENT
Start: 2020-04-10 | End: 2020-04-09

## 2020-04-09 RX ORDER — ASPIRIN AND DIPYRIDAMOLE 25; 200 MG/1; MG/1
1 CAPSULE, EXTENDED RELEASE ORAL 2 TIMES DAILY
Status: DISCONTINUED | OUTPATIENT
Start: 2020-04-09 | End: 2020-04-21 | Stop reason: HOSPADM

## 2020-04-09 RX ORDER — LOSARTAN POTASSIUM 50 MG/1
50 TABLET ORAL DAILY
Status: DISCONTINUED | OUTPATIENT
Start: 2020-04-10 | End: 2020-04-21 | Stop reason: HOSPADM

## 2020-04-09 RX ORDER — LOVASTATIN 20 MG/1
50 TABLET ORAL NIGHTLY
Status: DISCONTINUED | OUTPATIENT
Start: 2020-04-09 | End: 2020-04-14

## 2020-04-09 RX ADMIN — PIPERACILLIN AND TAZOBACTAM 4.5 G: 4; .5 INJECTION, POWDER, LYOPHILIZED, FOR SOLUTION INTRAVENOUS; PARENTERAL at 23:42

## 2020-04-09 RX ADMIN — INSULIN HUMAN 5 UNITS: 100 INJECTION, SUSPENSION SUBCUTANEOUS at 22:44

## 2020-04-09 RX ADMIN — ISOSORBIDE MONONITRATE 30 MG: 30 TABLET, EXTENDED RELEASE ORAL at 22:34

## 2020-04-09 RX ADMIN — ASPIRIN AND DIPYRIDAMOLE 1 CAPSULE: 25; 200 CAPSULE, EXTENDED RELEASE ORAL at 22:33

## 2020-04-09 RX ADMIN — LOVASTATIN 50 MG: 20 TABLET ORAL at 22:33

## 2020-04-09 RX ADMIN — OXYCODONE HYDROCHLORIDE 10 MG: 5 TABLET ORAL at 22:34

## 2020-04-09 RX ADMIN — SODIUM CHLORIDE: 9 INJECTION, SOLUTION INTRAVENOUS at 22:35

## 2020-04-09 RX ADMIN — SPIRONOLACTONE 50 MG: 50 TABLET ORAL at 22:34

## 2020-04-09 ASSESSMENT — LIFESTYLE VARIABLES
EVER_SMOKED: YES
CONSUMPTION TOTAL: NEGATIVE
EVER HAD A DRINK FIRST THING IN THE MORNING TO STEADY YOUR NERVES TO GET RID OF A HANGOVER: NO
ON A TYPICAL DAY WHEN YOU DRINK ALCOHOL HOW MANY DRINKS DO YOU HAVE: 0
HAVE PEOPLE ANNOYED YOU BY CRITICIZING YOUR DRINKING: NO
TOTAL SCORE: 0
HAVE YOU EVER FELT YOU SHOULD CUT DOWN ON YOUR DRINKING: NO
ALCOHOL_USE: NO
HOW MANY TIMES IN THE PAST YEAR HAVE YOU HAD 5 OR MORE DRINKS IN A DAY: 0
EVER FELT BAD OR GUILTY ABOUT YOUR DRINKING: NO
AVERAGE NUMBER OF DAYS PER WEEK YOU HAVE A DRINK CONTAINING ALCOHOL: 0
TOTAL SCORE: 0
TOTAL SCORE: 0

## 2020-04-09 ASSESSMENT — PATIENT HEALTH QUESTIONNAIRE - PHQ9
SUM OF ALL RESPONSES TO PHQ9 QUESTIONS 1 AND 2: 0
2. FEELING DOWN, DEPRESSED, IRRITABLE, OR HOPELESS: NOT AT ALL
1. LITTLE INTEREST OR PLEASURE IN DOING THINGS: NOT AT ALL

## 2020-04-09 ASSESSMENT — COGNITIVE AND FUNCTIONAL STATUS - GENERAL
MOVING TO AND FROM BED TO CHAIR: UNABLE
SUGGESTED CMS G CODE MODIFIER MOBILITY: CM
STANDING UP FROM CHAIR USING ARMS: TOTAL
PERSONAL GROOMING: A LOT
DRESSING REGULAR UPPER BODY CLOTHING: A LOT
DAILY ACTIVITIY SCORE: 12
EATING MEALS: A LOT
CLIMB 3 TO 5 STEPS WITH RAILING: TOTAL
HELP NEEDED FOR BATHING: A LOT
DRESSING REGULAR LOWER BODY CLOTHING: A LOT
TURNING FROM BACK TO SIDE WHILE IN FLAT BAD: A LOT
TOILETING: A LOT
MOVING FROM LYING ON BACK TO SITTING ON SIDE OF FLAT BED: UNABLE
WALKING IN HOSPITAL ROOM: TOTAL
SUGGESTED CMS G CODE MODIFIER DAILY ACTIVITY: CL
MOBILITY SCORE: 7

## 2020-04-09 ASSESSMENT — ENCOUNTER SYMPTOMS
DIZZINESS: 0
SPUTUM PRODUCTION: 0
FOCAL WEAKNESS: 1
SHORTNESS OF BREATH: 0
COUGH: 1
ABDOMINAL PAIN: 0
HEADACHES: 0
FEVER: 0
SPEECH CHANGE: 1
VOMITING: 0
WHEEZING: 0
NAUSEA: 0
HEMOPTYSIS: 0
SORE THROAT: 0
CHILLS: 0

## 2020-04-09 ASSESSMENT — COPD QUESTIONNAIRES
DURING THE PAST 4 WEEKS HOW MUCH DID YOU FEEL SHORT OF BREATH: NONE/LITTLE OF THE TIME
DO YOU EVER COUGH UP ANY MUCUS OR PHLEGM?: NO/ONLY WITH OCCASIONAL COLDS OR INFECTIONS
IN THE PAST 12 MONTHS DO YOU DO LESS THAN YOU USED TO BECAUSE OF YOUR BREATHING PROBLEMS: DISAGREE/UNSURE
HAVE YOU SMOKED AT LEAST 100 CIGARETTES IN YOUR ENTIRE LIFE: NO/DON'T KNOW
COPD SCREENING SCORE: 2

## 2020-04-09 NOTE — PROGRESS NOTES
Laurie physician: Dr. sU  Referring facility: [facility]    64-year-old male history of diabetes, hypertension, history of CVA and a right AKA in the past, admitted with left foot diabetic ulcer which has failed extensive wound care, wound VAC and antibiotic treatment outpatient.    Case was discussed with Dr. Lebron who will perform debridement.  Records being sent include antibiotic history and recent vascular studies    Note: Per Dr. Lebron, Please involve orthopedic surgery during this hospital stay as well

## 2020-04-09 NOTE — PROGRESS NOTES
Transfer Center:    Received clinic to Direct Admit transfer request from [facility] @ [id number]  Sending Physician:  Abeba   Specialist consulted:  Bernardino   Diagnosis:  DM ulcers of RLE leg & foot   Patient accepted by:  Elisha     Patient coming via:  ground  ETA:  TBD   Medical records from sending facility scanned into Media tab.  Nursing to notify Direct Admit On-Call hospitalist and/or Specialist when patient arrives. Please release the signed & held ADT.    Plan:  Transfer Center to assist if needed.

## 2020-04-10 LAB
ANION GAP SERPL CALC-SCNC: 14 MMOL/L (ref 7–16)
APTT PPP: 35.1 SEC (ref 24.7–36)
BASOPHILS # BLD AUTO: 0.2 % (ref 0–1.8)
BASOPHILS # BLD: 0.03 K/UL (ref 0–0.12)
BUN SERPL-MCNC: 22 MG/DL (ref 8–22)
CALCIUM SERPL-MCNC: 9.3 MG/DL (ref 8.5–10.5)
CHLORIDE SERPL-SCNC: 94 MMOL/L (ref 96–112)
CO2 SERPL-SCNC: 22 MMOL/L (ref 20–33)
CREAT SERPL-MCNC: 0.96 MG/DL (ref 0.5–1.4)
EOSINOPHIL # BLD AUTO: 0.06 K/UL (ref 0–0.51)
EOSINOPHIL NFR BLD: 0.5 % (ref 0–6.9)
ERYTHROCYTE [DISTWIDTH] IN BLOOD BY AUTOMATED COUNT: 41.1 FL (ref 35.9–50)
EST. AVERAGE GLUCOSE BLD GHB EST-MCNC: 157 MG/DL
GLUCOSE BLD-MCNC: 120 MG/DL (ref 65–99)
GLUCOSE BLD-MCNC: 123 MG/DL (ref 65–99)
GLUCOSE BLD-MCNC: 166 MG/DL (ref 65–99)
GLUCOSE BLD-MCNC: 96 MG/DL (ref 65–99)
GLUCOSE SERPL-MCNC: 109 MG/DL (ref 65–99)
GRAM STN SPEC: NORMAL
HBA1C MFR BLD: 7.1 % (ref 0–5.6)
HCT VFR BLD AUTO: 30 % (ref 42–52)
HGB BLD-MCNC: 10 G/DL (ref 14–18)
IMM GRANULOCYTES # BLD AUTO: 0.06 K/UL (ref 0–0.11)
IMM GRANULOCYTES NFR BLD AUTO: 0.5 % (ref 0–0.9)
INR PPP: 1.22 (ref 0.87–1.13)
LYMPHOCYTES # BLD AUTO: 2.25 K/UL (ref 1–4.8)
LYMPHOCYTES NFR BLD: 17.5 % (ref 22–41)
MCH RBC QN AUTO: 26 PG (ref 27–33)
MCHC RBC AUTO-ENTMCNC: 33.3 G/DL (ref 33.7–35.3)
MCV RBC AUTO: 77.9 FL (ref 81.4–97.8)
MONOCYTES # BLD AUTO: 1.02 K/UL (ref 0–0.85)
MONOCYTES NFR BLD AUTO: 8 % (ref 0–13.4)
NEUTROPHILS # BLD AUTO: 9.41 K/UL (ref 1.82–7.42)
NEUTROPHILS NFR BLD: 73.3 % (ref 44–72)
NRBC # BLD AUTO: 0 K/UL
NRBC BLD-RTO: 0 /100 WBC
PLATELET # BLD AUTO: 320 K/UL (ref 164–446)
PMV BLD AUTO: 9.3 FL (ref 9–12.9)
POTASSIUM SERPL-SCNC: 4.3 MMOL/L (ref 3.6–5.5)
PROTHROMBIN TIME: 15.7 SEC (ref 12–14.6)
RBC # BLD AUTO: 3.85 M/UL (ref 4.7–6.1)
SIGNIFICANT IND 70042: NORMAL
SITE SITE: NORMAL
SODIUM SERPL-SCNC: 130 MMOL/L (ref 135–145)
SOURCE SOURCE: NORMAL
WBC # BLD AUTO: 12.8 K/UL (ref 4.8–10.8)

## 2020-04-10 PROCEDURE — 700111 HCHG RX REV CODE 636 W/ 250 OVERRIDE (IP): Performed by: INTERNAL MEDICINE

## 2020-04-10 PROCEDURE — A9270 NON-COVERED ITEM OR SERVICE: HCPCS | Performed by: INTERNAL MEDICINE

## 2020-04-10 PROCEDURE — 83036 HEMOGLOBIN GLYCOSYLATED A1C: CPT

## 2020-04-10 PROCEDURE — 99232 SBSQ HOSP IP/OBS MODERATE 35: CPT | Performed by: FAMILY MEDICINE

## 2020-04-10 PROCEDURE — 700105 HCHG RX REV CODE 258: Performed by: INTERNAL MEDICINE

## 2020-04-10 PROCEDURE — 80048 BASIC METABOLIC PNL TOTAL CA: CPT

## 2020-04-10 PROCEDURE — 700102 HCHG RX REV CODE 250 W/ 637 OVERRIDE(OP): Performed by: FAMILY MEDICINE

## 2020-04-10 PROCEDURE — 36415 COLL VENOUS BLD VENIPUNCTURE: CPT

## 2020-04-10 PROCEDURE — 85730 THROMBOPLASTIN TIME PARTIAL: CPT

## 2020-04-10 PROCEDURE — 700102 HCHG RX REV CODE 250 W/ 637 OVERRIDE(OP): Performed by: INTERNAL MEDICINE

## 2020-04-10 PROCEDURE — A9270 NON-COVERED ITEM OR SERVICE: HCPCS | Performed by: FAMILY MEDICINE

## 2020-04-10 PROCEDURE — 85610 PROTHROMBIN TIME: CPT

## 2020-04-10 PROCEDURE — 85025 COMPLETE CBC W/AUTO DIFF WBC: CPT

## 2020-04-10 PROCEDURE — 770001 HCHG ROOM/CARE - MED/SURG/GYN PRIV*

## 2020-04-10 PROCEDURE — 82962 GLUCOSE BLOOD TEST: CPT | Mod: 91

## 2020-04-10 RX ADMIN — HYDRALAZINE HYDROCHLORIDE 100 MG: 25 TABLET, FILM COATED ORAL at 17:24

## 2020-04-10 RX ADMIN — OXYCODONE HYDROCHLORIDE 10 MG: 5 TABLET ORAL at 05:17

## 2020-04-10 RX ADMIN — HYDRALAZINE HYDROCHLORIDE 100 MG: 25 TABLET, FILM COATED ORAL at 05:17

## 2020-04-10 RX ADMIN — ASPIRIN AND DIPYRIDAMOLE 1 CAPSULE: 25; 200 CAPSULE, EXTENDED RELEASE ORAL at 05:22

## 2020-04-10 RX ADMIN — LOVASTATIN 50 MG: 20 TABLET ORAL at 21:14

## 2020-04-10 RX ADMIN — PIPERACILLIN AND TAZOBACTAM 4.5 G: 4; .5 INJECTION, POWDER, LYOPHILIZED, FOR SOLUTION INTRAVENOUS; PARENTERAL at 12:11

## 2020-04-10 RX ADMIN — PIPERACILLIN AND TAZOBACTAM 4.5 G: 4; .5 INJECTION, POWDER, LYOPHILIZED, FOR SOLUTION INTRAVENOUS; PARENTERAL at 21:11

## 2020-04-10 RX ADMIN — ISOSORBIDE MONONITRATE 30 MG: 30 TABLET, EXTENDED RELEASE ORAL at 17:24

## 2020-04-10 RX ADMIN — OMEPRAZOLE 20 MG: 20 CAPSULE, DELAYED RELEASE ORAL at 05:17

## 2020-04-10 RX ADMIN — ASPIRIN AND DIPYRIDAMOLE 1 CAPSULE: 25; 200 CAPSULE, EXTENDED RELEASE ORAL at 17:23

## 2020-04-10 RX ADMIN — ISOSORBIDE MONONITRATE 30 MG: 30 TABLET, EXTENDED RELEASE ORAL at 05:18

## 2020-04-10 RX ADMIN — PIPERACILLIN AND TAZOBACTAM 4.5 G: 4; .5 INJECTION, POWDER, LYOPHILIZED, FOR SOLUTION INTRAVENOUS; PARENTERAL at 03:14

## 2020-04-10 RX ADMIN — ACETAMINOPHEN 650 MG: 325 TABLET, FILM COATED ORAL at 09:22

## 2020-04-10 RX ADMIN — OXYCODONE HYDROCHLORIDE 15 MG: 10 TABLET ORAL at 12:11

## 2020-04-10 RX ADMIN — AMLODIPINE BESYLATE 10 MG: 10 TABLET ORAL at 05:18

## 2020-04-10 RX ADMIN — AMITRIPTYLINE HYDROCHLORIDE 50 MG: 25 TABLET, FILM COATED ORAL at 21:15

## 2020-04-10 RX ADMIN — SPIRONOLACTONE 50 MG: 50 TABLET ORAL at 05:17

## 2020-04-10 RX ADMIN — SODIUM CHLORIDE: 9 INJECTION, SOLUTION INTRAVENOUS at 12:11

## 2020-04-10 RX ADMIN — LOSARTAN POTASSIUM 50 MG: 50 TABLET, FILM COATED ORAL at 05:18

## 2020-04-10 RX ADMIN — INSULIN HUMAN 5 UNITS: 100 INJECTION, SUSPENSION SUBCUTANEOUS at 21:51

## 2020-04-10 RX ADMIN — OXYCODONE HYDROCHLORIDE 15 MG: 10 TABLET ORAL at 21:52

## 2020-04-10 RX ADMIN — OXYCODONE HYDROCHLORIDE 15 MG: 10 TABLET ORAL at 17:24

## 2020-04-10 RX ADMIN — SPIRONOLACTONE 50 MG: 50 TABLET ORAL at 17:24

## 2020-04-10 RX ADMIN — ATENOLOL 100 MG: 50 TABLET ORAL at 05:17

## 2020-04-10 ASSESSMENT — ENCOUNTER SYMPTOMS
PALPITATIONS: 0
NAUSEA: 0
MYALGIAS: 0
NECK PAIN: 0
TINGLING: 0
PHOTOPHOBIA: 0
DOUBLE VISION: 0
FEVER: 0
HEARTBURN: 0
BLURRED VISION: 0
DIAPHORESIS: 0
DIZZINESS: 0
HEMOPTYSIS: 0
HEADACHES: 0
ORTHOPNEA: 0
CHILLS: 0
COUGH: 0
SPUTUM PRODUCTION: 0
VOMITING: 0

## 2020-04-10 NOTE — PROGRESS NOTES
2 RN Skin Check Completed    Pt has wound and redness on sacrum, mepilex put in place.   L upper, posterior thigh wound present  R lateral heel has wound with eschar   R lower outer leg has multiple wounds with slough  Excoriation present on R upper, posterior thigh  Pt has L AKA with healed incision site    All other bony prominences inspected and intact

## 2020-04-10 NOTE — H&P
Hospital Medicine History & Physical Note    Date of Service  4/9/2020    Primary Care Physician  Pcp Pt States None    Consultants  Bernardino    Code Status  Full Code      Chief Complaint  Leg wound    History of Presenting Illness  64 y.o. male who presented 4/9/2020 with stroke, DM, HTN who [facility] from [facility] with left diabetic foot ulcer that has been not healing despite antibiotics and wound Vac.  His documents show that his wound culture was growing Pseudomonas and enterococcus and he was treated with oral antibiotics.  He is unsure if he is still on antibiotics.  Dr. Lebron was consulted who recommended admission for debridement.  He complains of pain around the wound.  He denies fevers or chills.    Review of Systems  Review of Systems   Constitutional: Negative for chills and fever.   HENT: Negative for sore throat.    Respiratory: Positive for cough (Chronic, intermittent). Negative for hemoptysis, sputum production, shortness of breath and wheezing.    Cardiovascular: Negative for chest pain.   Gastrointestinal: Negative for abdominal pain, nausea and vomiting.   Musculoskeletal: Positive for joint pain.   Skin: Positive for rash. Negative for itching.   Neurological: Positive for speech change (Chronic) and focal weakness (Chronic). Negative for dizziness and headaches.   All other systems reviewed and are negative.      Past Medical History   has a past medical history of CVA (cerebral infarction) (2011), Diabetes (CMS-[facility]), Dysphagia, HTN (hypertension), and Obesity.    Surgical History   has a past surgical history that includes vitrectomy posterior (Left, 1/5/2016) and knee amputation above (Left, 9/7/2017).     Family History  family history is not on file.     Social History   reports that he has never smoked. He does not have any smokeless tobacco history on file. He reports that he does not drink alcohol or use drugs.    Allergies  No Known Allergies    Medications  Prior to  Admission Medications   Prescriptions Last Dose Informant Patient Reported? Taking?   Dextromethorphan-Quinidine (NUEDEXTA) 20-10 MG Cap 4/9/2020 at Unknown time MAR from Other Facility Yes No   Sig: Take 1 Tab by mouth 2 Times a Day.   amitriptyline (ELAVIL) 50 MG Tab 4/8/2020 at Unknown time  Yes Yes   Sig: Take 50 mg by mouth every evening.   amlodipine (NORVASC) 5 MG Tab 4/9/2020 at Unknown time MAR from Other Facility Yes No   Sig: Take 10 mg by mouth every day.   atenolol (TENORMIN) 100 MG Tab 4/9/2020 at Unknown time MAR from Other Facility Yes No   Sig: Take 100 mg by mouth every day.   dipyridamole-aspirin (AGGRENOX)  MG CAPSULE SR 12 HR 4/9/2020 at Unknown time MAR from Other Facility Yes No   Sig: Take 1 Cap by mouth 2 times a day.   glipiZIDE (GLUCOTROL) 10 MG Tab 4/9/2020 at Unknown time  Yes Yes   Sig: Take 10 mg by mouth 2 times a day.   hydrALAZINE (APRESOLINE) 50 MG Tab 4/9/2020 at Unknown time MAR from Other Facility Yes No   Sig: Take 100 mg by mouth 2 Times a Day.   insulin NPH (HUMULIN/NOVOLIN) 100 UNIT/ML Suspension 4/9/2020 at Unknown time  Yes Yes   Sig: Inject 14 Units as instructed every morning.   insulin NPH (HUMULIN/NOVOLIN) 100 UNIT/ML Suspension 4/8/2020 at Unknown time  Yes Yes   Sig: Inject 5 Units as instructed every evening.   insulin regular (HUMULIN R) 100 Unit/mL Solution 4/8/2020 at Unknown time  Yes Yes   Sig: Inject 2-10 Units as instructed 2 times a day as needed for High Blood Sugar.   isosorbide mononitrate SR (IMDUR) 30 MG TABLET SR 24 HR 4/9/2020 at Unknown time MAR from Other Facility Yes No   Sig: Take 30 mg by mouth 2 Times a Day.   losartan (COZAAR) 50 MG Tab 4/9/2020 at Unknown time MAR from Other Facility Yes No   Sig: Take 50 mg by mouth every day.   lovastatin (MEVACOR) 40 MG tablet 4/9/2020 at Unknown time MAR from Other Facility Yes No   Sig: Take 50 mg by mouth every evening.   omeprazole (PRILOSEC OTC) 20 MG tablet 4/9/2020 at Unknown time MAR from  Other Facility Yes No   Sig: Take 20 mg by mouth every day.   oxycodone (OXY-IR) 15 MG immediate release tablet 4/9/2020 at Unknown time MAR from Other Facility Yes No   Sig: Take 15 mg by mouth every four hours as needed for Severe Pain.   spironolactone (ALDACTONE) 50 MG Tab 4/9/2020 at Unknown time MAR from Other Facility Yes No   Sig: Take 50 mg by mouth 2 times a day.      Facility-Administered Medications: None       Physical Exam  Temp:  [36.9 °C (98.5 °F)] 36.9 °C (98.5 °F)  Pulse:  [71] 71  Resp:  [19] 19  BP: (141)/(74) 141/74  SpO2:  [95 %] 95 %    Physical Exam  Vitals signs and nursing note reviewed.   Constitutional:       General: He is not in acute distress.     Appearance: He is not toxic-appearing or diaphoretic.   HENT:      Head: Normocephalic.      Mouth/Throat:      Mouth: Mucous membranes are moist.   Eyes:      General:         Right eye: No discharge.         Left eye: No discharge.      Pupils: Pupils are equal, round, and reactive to light.   Neck:      Musculoskeletal: Neck supple.   Cardiovascular:      Rate and Rhythm: Normal rate and regular rhythm.   Pulmonary:      Effort: Pulmonary effort is normal.      Breath sounds: No wheezing or rales.   Abdominal:      Palpations: Abdomen is soft.      Tenderness: There is no abdominal tenderness. There is no guarding or rebound.   Musculoskeletal:      Comments: Left leg amputation   Skin:     Comments: Right heel with dark ulceration, no drainage, gauzes adhered to the wound, very tender, no edema.  He has smaller ulcerations to his right lateral lower extremity.  Unable to palpate pedal pulses   Neurological:      Mental Status: He is alert and oriented to person, place, and time.      Comments: Slurred speech, left upper extremity weakness with 3/5 strength   Psychiatric:         Mood and Affect: Mood normal.         Behavior: Behavior normal.         Laboratory:          No results for input(s): ALTSGPT, ASTSGOT, ALKPHOSPHAT, TBILIRUBIN,  DBILIRUBIN, GAMMAGT, AMYLASE, LIPASE, ALB, PREALBUMIN, GLUCOSE in the last 72 hours.      No results for input(s): NTPROBNP in the last 72 hours.      No results for input(s): TROPONINT in the last 72 hours.    Urinalysis:    No results found     Imaging:  No orders to display         Assessment/Plan:  I anticipate this patient will require at least two midnights for appropriate medical management, necessitating inpatient admission.    * Diabetic foot ulcer (HCC)- (present on admission)  Assessment & Plan  Not healing despite oral antibiotics and wound VAC  Wound was previously growing enterococcus and Pseudomonas  Collect new wound culture  I will start on Zosyn for now  Dr. Lebron to debride wound tomorrow, n.p.o. after midnight  We will need to consult orthopedics in the morning    PAD (peripheral artery disease) (HCC)- (present on admission)  Assessment & Plan  Dr. Lebron was recommended, she will see the patient tomorrow    Dysphagia as late effect of stroke- (present on admission)  Assessment & Plan  History of    History of stroke- (present on admission)  Assessment & Plan  With residual left-sided weakness and dysphasia  Do you on Aggrenox and statin    HTN (hypertension)- (present on admission)  Assessment & Plan  Mildly hypertensive   continue on Norvasc, hydralazine, Imdur, atenolol, losartan    DM (diabetes mellitus) (HCC)- (present on admission)  Assessment & Plan  Continue on NPH and ISS  Check A1c  Diabetic diet      VTE prophylaxis: scds

## 2020-04-10 NOTE — ASSESSMENT & PLAN NOTE
Multiple wounds of RLE  Not healing despite oral antibiotics and wound VAC  Wound culture with pseudomonas and enterococcus   Vascular surgery following  S/P RLE angiogram on 4/13 showing aortoiliac occlusive disease  S/P right AKA on 4/15  Pathology findings consistent with OM  Discontinue zosyn.  Ortho surgery and LPS following.   Wound care.   Continue oxycontin and neurontin   Continue pain control IV and oral narcotics.    PT/OT following

## 2020-04-10 NOTE — ASSESSMENT & PLAN NOTE
S/P RLE angiogram showing PAD and aortoiliac occlusive disease  Vascular surgery and ortho following.   Right AKA 4/15.   Continue lipitor, aggrenox, and plavix.

## 2020-04-10 NOTE — ASSESSMENT & PLAN NOTE
hema A1c is 7.1  Glucose poorly controlled.  Increase lantus to 40 units every evening  t prandial insulin  SSI  Attempt to maintain BG < 150 to promote healing.   accu checks are noted  Diabetic diet

## 2020-04-10 NOTE — PROGRESS NOTES
Hospital Medicine Daily Progress Note    Date of Service  4/10/2020    Chief Complaint  64 y.o. male admitted 4/9/2020 with Leg wound       Hospital Course    64 y.o. male who presented 4/9/2020 with stroke, DM, HTN who presented from correctional facility with left diabetic foot ulcer that has been not healing despite antibiotics and wound Vac.  His documents show that his wound culture was growing Pseudomonas and enterococcus and he was treated with oral antibiotics.  He is unsure if he is still on antibiotics.  Dr. Lebron was consulted who recommended admission for debridement.  He complains of pain around the wound.  He denies fevers or chills.    Interval Problem Update  Awaiting vascular surgery recommendations    Consultants/Specialty  Vascular surgery    Code Status  full    Disposition  pending    Review of Systems  Review of Systems   Constitutional: Negative for chills, diaphoresis and fever.   HENT: Negative for ear discharge, ear pain and tinnitus.    Eyes: Negative for blurred vision, double vision and photophobia.   Respiratory: Negative for cough, hemoptysis and sputum production.    Cardiovascular: Negative for chest pain, palpitations and orthopnea.   Gastrointestinal: Negative for heartburn, nausea and vomiting.   Genitourinary: Negative for dysuria, frequency and urgency.   Musculoskeletal: Negative for myalgias and neck pain.   Skin: Negative for itching.   Neurological: Negative for dizziness, tingling and headaches.        Physical Exam  Temp:  [36.4 °C (97.5 °F)-37.3 °C (99.2 °F)] 36.4 °C (97.5 °F)  Pulse:  [65-73] 65  Resp:  [18-19] 18  BP: (123-141)/(59-74) 134/59  SpO2:  [92 %-96 %] 96 %    Physical Exam  Constitutional:       Appearance: He is obese.   HENT:      Head: Normocephalic and atraumatic.      Nose: Nose normal.      Mouth/Throat:      Mouth: Mucous membranes are dry.   Eyes:      Extraocular Movements: Extraocular movements intact.      Pupils: Pupils are equal, round, and  reactive to light.   Neck:      Musculoskeletal: Normal range of motion and neck supple.   Cardiovascular:      Rate and Rhythm: Normal rate and regular rhythm.      Pulses: Normal pulses.      Heart sounds: Normal heart sounds.   Pulmonary:      Effort: Pulmonary effort is normal.      Breath sounds: Normal breath sounds.   Abdominal:      Tenderness: There is no abdominal tenderness. There is no guarding.   Musculoskeletal: Normal range of motion.      Comments: Eschar and ulcers are noted on the  Lateral side of the right foot   Skin:     General: Skin is warm and dry.   Neurological:      Mental Status: He is alert. Mental status is at baseline.         Fluids    Intake/Output Summary (Last 24 hours) at 4/10/2020 1035  Last data filed at 4/10/2020 0758  Gross per 24 hour   Intake 615.58 ml   Output 625 ml   Net -9.42 ml       Laboratory  Recent Labs     04/10/20  0425   WBC 12.8*   RBC 3.85*   HEMOGLOBIN 10.0*   HEMATOCRIT 30.0*   MCV 77.9*   MCH 26.0*   MCHC 33.3*   RDW 41.1   PLATELETCT 320   MPV 9.3     Recent Labs     04/10/20  0425   SODIUM 130*   POTASSIUM 4.3   CHLORIDE 94*   CO2 22   GLUCOSE 109*   BUN 22   CREATININE 0.96   CALCIUM 9.3     Recent Labs     04/10/20  0425   APTT 35.1   INR 1.22*               Imaging       Assessment/Plan  * Diabetic foot ulcer (HCC)- (present on admission)  Assessment & Plan  Not healing despite oral antibiotics and wound VAC  Wound was previously growing enterococcus and Pseudomonas  Collect new wound culture   on Zosyn for now  Awaiting vascular surgery input    PAD (peripheral artery disease) (HCC)- (present on admission)  Assessment & Plan  Awaiting vascular surgery/dr padgett's  input    Dysphagia as late effect of stroke- (present on admission)  Assessment & Plan  History of    History of stroke- (present on admission)  Assessment & Plan  With residual left-sided weakness and dysphasia  Aspiring  aggrenox  lovastatin    HTN (hypertension)- (present on  admission)  Assessment & Plan  Mildly hypertensive   continue on Norvasc, hydralazine, Imdur, atenolol, losartan    DM (diabetes mellitus) (HCC)- (present on admission)  Assessment & Plan  Continue on NPH and ISS  hema A1c is pending  accu checks are noted  Diabetic diet       VTE prophylaxis: scd

## 2020-04-10 NOTE — DISCHARGE PLANNING
This RN CM will continue to follow the case and assist with discharge planning as needed.    Need to Contact [facility] to coordinate with [facility] and [facility] on what they need for medical clearance back to the [facility].

## 2020-04-11 ENCOUNTER — APPOINTMENT (OUTPATIENT)
Dept: RADIOLOGY | Facility: MEDICAL CENTER | Age: 65
DRG: 617 | End: 2020-04-11
Attending: SURGERY
Payer: MEDICAID

## 2020-04-11 LAB
ALBUMIN SERPL BCP-MCNC: 3.5 G/DL (ref 3.2–4.9)
ALBUMIN/GLOB SERPL: 0.8 G/DL
ALP SERPL-CCNC: 77 U/L (ref 30–99)
ALT SERPL-CCNC: 9 U/L (ref 2–50)
ANION GAP SERPL CALC-SCNC: 12 MMOL/L (ref 7–16)
AST SERPL-CCNC: 7 U/L (ref 12–45)
BASOPHILS # BLD AUTO: 0.4 % (ref 0–1.8)
BASOPHILS # BLD: 0.04 K/UL (ref 0–0.12)
BILIRUB SERPL-MCNC: 0.5 MG/DL (ref 0.1–1.5)
BUN SERPL-MCNC: 14 MG/DL (ref 8–22)
CALCIUM SERPL-MCNC: 9.4 MG/DL (ref 8.5–10.5)
CHLORIDE SERPL-SCNC: 91 MMOL/L (ref 96–112)
CO2 SERPL-SCNC: 23 MMOL/L (ref 20–33)
CREAT SERPL-MCNC: 0.82 MG/DL (ref 0.5–1.4)
EOSINOPHIL # BLD AUTO: 0.12 K/UL (ref 0–0.51)
EOSINOPHIL NFR BLD: 1.2 % (ref 0–6.9)
ERYTHROCYTE [DISTWIDTH] IN BLOOD BY AUTOMATED COUNT: 41.2 FL (ref 35.9–50)
GLOBULIN SER CALC-MCNC: 4.2 G/DL (ref 1.9–3.5)
GLUCOSE BLD-MCNC: 138 MG/DL (ref 65–99)
GLUCOSE SERPL-MCNC: 151 MG/DL (ref 65–99)
HCT VFR BLD AUTO: 30.9 % (ref 42–52)
HGB BLD-MCNC: 10.2 G/DL (ref 14–18)
IMM GRANULOCYTES # BLD AUTO: 0.07 K/UL (ref 0–0.11)
IMM GRANULOCYTES NFR BLD AUTO: 0.7 % (ref 0–0.9)
LYMPHOCYTES # BLD AUTO: 2.09 K/UL (ref 1–4.8)
LYMPHOCYTES NFR BLD: 20.2 % (ref 22–41)
MCH RBC QN AUTO: 25.8 PG (ref 27–33)
MCHC RBC AUTO-ENTMCNC: 33 G/DL (ref 33.7–35.3)
MCV RBC AUTO: 78 FL (ref 81.4–97.8)
MONOCYTES # BLD AUTO: 0.74 K/UL (ref 0–0.85)
MONOCYTES NFR BLD AUTO: 7.2 % (ref 0–13.4)
NEUTROPHILS # BLD AUTO: 7.28 K/UL (ref 1.82–7.42)
NEUTROPHILS NFR BLD: 70.3 % (ref 44–72)
NRBC # BLD AUTO: 0 K/UL
NRBC BLD-RTO: 0 /100 WBC
PLATELET # BLD AUTO: 298 K/UL (ref 164–446)
PMV BLD AUTO: 9.3 FL (ref 9–12.9)
POTASSIUM SERPL-SCNC: 4.5 MMOL/L (ref 3.6–5.5)
PROT SERPL-MCNC: 7.7 G/DL (ref 6–8.2)
RBC # BLD AUTO: 3.96 M/UL (ref 4.7–6.1)
SODIUM SERPL-SCNC: 126 MMOL/L (ref 135–145)
WBC # BLD AUTO: 10.3 K/UL (ref 4.8–10.8)

## 2020-04-11 PROCEDURE — 700102 HCHG RX REV CODE 250 W/ 637 OVERRIDE(OP): Performed by: FAMILY MEDICINE

## 2020-04-11 PROCEDURE — 82962 GLUCOSE BLOOD TEST: CPT

## 2020-04-11 PROCEDURE — 770001 HCHG ROOM/CARE - MED/SURG/GYN PRIV*

## 2020-04-11 PROCEDURE — 80053 COMPREHEN METABOLIC PANEL: CPT

## 2020-04-11 PROCEDURE — 36415 COLL VENOUS BLD VENIPUNCTURE: CPT

## 2020-04-11 PROCEDURE — A9270 NON-COVERED ITEM OR SERVICE: HCPCS | Performed by: FAMILY MEDICINE

## 2020-04-11 PROCEDURE — 700102 HCHG RX REV CODE 250 W/ 637 OVERRIDE(OP): Performed by: INTERNAL MEDICINE

## 2020-04-11 PROCEDURE — 93926 LOWER EXTREMITY STUDY: CPT | Mod: RT

## 2020-04-11 PROCEDURE — A9270 NON-COVERED ITEM OR SERVICE: HCPCS | Performed by: INTERNAL MEDICINE

## 2020-04-11 PROCEDURE — 99232 SBSQ HOSP IP/OBS MODERATE 35: CPT | Performed by: FAMILY MEDICINE

## 2020-04-11 PROCEDURE — 85025 COMPLETE CBC W/AUTO DIFF WBC: CPT

## 2020-04-11 PROCEDURE — 700105 HCHG RX REV CODE 258: Performed by: INTERNAL MEDICINE

## 2020-04-11 PROCEDURE — 700111 HCHG RX REV CODE 636 W/ 250 OVERRIDE (IP): Performed by: INTERNAL MEDICINE

## 2020-04-11 PROCEDURE — 93922 UPR/L XTREMITY ART 2 LEVELS: CPT | Mod: 52,RT

## 2020-04-11 RX ADMIN — LOVASTATIN 50 MG: 20 TABLET ORAL at 21:13

## 2020-04-11 RX ADMIN — HYDRALAZINE HYDROCHLORIDE 100 MG: 25 TABLET, FILM COATED ORAL at 17:57

## 2020-04-11 RX ADMIN — SPIRONOLACTONE 50 MG: 50 TABLET ORAL at 06:03

## 2020-04-11 RX ADMIN — SPIRONOLACTONE 50 MG: 50 TABLET ORAL at 17:59

## 2020-04-11 RX ADMIN — AMLODIPINE BESYLATE 10 MG: 10 TABLET ORAL at 06:02

## 2020-04-11 RX ADMIN — PIPERACILLIN AND TAZOBACTAM 4.5 G: 4; .5 INJECTION, POWDER, LYOPHILIZED, FOR SOLUTION INTRAVENOUS; PARENTERAL at 04:52

## 2020-04-11 RX ADMIN — OXYCODONE HYDROCHLORIDE 15 MG: 10 TABLET ORAL at 23:06

## 2020-04-11 RX ADMIN — ASPIRIN AND DIPYRIDAMOLE 1 CAPSULE: 25; 200 CAPSULE, EXTENDED RELEASE ORAL at 06:02

## 2020-04-11 RX ADMIN — SENNOSIDES AND DOCUSATE SODIUM 2 TABLET: 8.6; 5 TABLET ORAL at 17:58

## 2020-04-11 RX ADMIN — SODIUM CHLORIDE: 9 INJECTION, SOLUTION INTRAVENOUS at 12:43

## 2020-04-11 RX ADMIN — HYDRALAZINE HYDROCHLORIDE 100 MG: 25 TABLET, FILM COATED ORAL at 06:02

## 2020-04-11 RX ADMIN — INSULIN HUMAN 5 UNITS: 100 INJECTION, SUSPENSION SUBCUTANEOUS at 17:56

## 2020-04-11 RX ADMIN — ISOSORBIDE MONONITRATE 30 MG: 30 TABLET, EXTENDED RELEASE ORAL at 06:02

## 2020-04-11 RX ADMIN — ASPIRIN AND DIPYRIDAMOLE 1 CAPSULE: 25; 200 CAPSULE, EXTENDED RELEASE ORAL at 17:58

## 2020-04-11 RX ADMIN — OXYCODONE HYDROCHLORIDE 15 MG: 10 TABLET ORAL at 02:41

## 2020-04-11 RX ADMIN — LOSARTAN POTASSIUM 50 MG: 50 TABLET, FILM COATED ORAL at 06:03

## 2020-04-11 RX ADMIN — PIPERACILLIN AND TAZOBACTAM 4.5 G: 4; .5 INJECTION, POWDER, LYOPHILIZED, FOR SOLUTION INTRAVENOUS; PARENTERAL at 12:42

## 2020-04-11 RX ADMIN — OXYCODONE HYDROCHLORIDE 15 MG: 10 TABLET ORAL at 17:58

## 2020-04-11 RX ADMIN — OMEPRAZOLE 20 MG: 20 CAPSULE, DELAYED RELEASE ORAL at 06:02

## 2020-04-11 RX ADMIN — PIPERACILLIN AND TAZOBACTAM 4.5 G: 4; .5 INJECTION, POWDER, LYOPHILIZED, FOR SOLUTION INTRAVENOUS; PARENTERAL at 21:10

## 2020-04-11 RX ADMIN — INSULIN HUMAN 14 UNITS: 100 INJECTION, SUSPENSION SUBCUTANEOUS at 06:09

## 2020-04-11 RX ADMIN — ISOSORBIDE MONONITRATE 30 MG: 30 TABLET, EXTENDED RELEASE ORAL at 17:58

## 2020-04-11 RX ADMIN — ATENOLOL 100 MG: 50 TABLET ORAL at 06:02

## 2020-04-11 RX ADMIN — OXYCODONE HYDROCHLORIDE 15 MG: 10 TABLET ORAL at 11:43

## 2020-04-11 RX ADMIN — SENNOSIDES AND DOCUSATE SODIUM 2 TABLET: 8.6; 5 TABLET ORAL at 06:02

## 2020-04-11 RX ADMIN — AMITRIPTYLINE HYDROCHLORIDE 50 MG: 25 TABLET, FILM COATED ORAL at 21:13

## 2020-04-11 ASSESSMENT — ENCOUNTER SYMPTOMS
DOUBLE VISION: 0
CHILLS: 0
SPUTUM PRODUCTION: 0
VOMITING: 0
DIAPHORESIS: 0
TREMORS: 0
HEADACHES: 0
WEIGHT LOSS: 0
HEMOPTYSIS: 0
PHOTOPHOBIA: 0
PALPITATIONS: 0
TINGLING: 0
EYE PAIN: 0
BACK PAIN: 0
FEVER: 0
SHORTNESS OF BREATH: 0
ABDOMINAL PAIN: 0
NAUSEA: 0
NECK PAIN: 0
ORTHOPNEA: 0

## 2020-04-11 NOTE — PROGRESS NOTES
Pt alert, oriented, guards at bedside, oxy and tylenol for R foot pain, JESSICA, floated on pillow, VSS, RA, IVFs infusing, WCTM.

## 2020-04-11 NOTE — CONSULTS
Date of Service  4/11/2020    Reason For Consult  Non-healing wounds right lower extremity    Requesting Physician  Murali Maciel MD    Consulting Physician  Veronica Lebron M.D.    Primary Care Physician  Pcp Pt States None    History of Present Illness  64 y.o. incarcerated male who presented 4/9/2020 with non-healing wounds to his remaining right leg.  In 2017, he had a left above knee amputation by Dr. Osei, But unfortunately, no follow-up.  He does not use a prosthesis, but rather a wheelchair for mobilization.  It is unclear how long the wounds have been present, but most of them appear to be pressure sores.  I know arterial imaging was performed at the prison, but those results didn't come with him.      The patient also has an unclear history of a stroke. This makes communication difficult, as he hesitates with his responses and his words are unclear.    Review of Systems  Review of Systems   Unable to perform ROS: Language       Past Medical History  Past Medical History:   Diagnosis Date   • CVA (cerebral infarction) 2011   • Diabetes (CMS-HCC)    • Dysphagia    • HTN (hypertension)    • Obesity        Surgical History  Past Surgical History:   Procedure Laterality Date   • KNEE AMPUTATION ABOVE Left 9/7/2017    Procedure: KNEE AMPUTATION ABOVE;  Surgeon: Ken Osei M.D.;  Location: SURGERY [facility];  Service: General   • VITRECTOMY POSTERIOR Left 1/5/2016    Procedure: VITRECTOMY POSTERIOR membrane dissection laser ;  Surgeon: Parmjit Newell M.D.;  Location: SURGERY SAME DAY [facility];  Service:         Family History  No family history on file.     Social History  Social History     Socioeconomic History   • Marital status: Single     Spouse name: Not on file   • Number of children: Not on file   • Years of education: Not on file   • Highest education level: Not on file   Occupational History   • Not on file   Social Needs   • Financial resource strain: Not on file   •  Food insecurity     Worry: Not on file     Inability: Not on file   • Transportation needs     Medical: Not on file     Non-medical: Not on file   Tobacco Use   • Smoking status: Never Smoker   Substance and Sexual Activity   • Alcohol use: No   • Drug use: No   • Sexual activity: Not on file   Lifestyle   • Physical activity     Days per week: Not on file     Minutes per session: Not on file   • Stress: Not on file   Relationships   • Social connections     Talks on phone: Not on file     Gets together: Not on file     Attends [employer] service: Not on file     Active member of club or organization: Not on file     Attends meetings of clubs or organizations: Not on file     Relationship status: Not on file   • Intimate partner violence     Fear of current or ex partner: Not on file     Emotionally abused: Not on file     Physically abused: Not on file     Forced sexual activity: Not on file   Other Topics Concern   • Not on file   Social History Narrative   • Not on file       Medications  Prior to Admission Medications   Prescriptions Last Dose Informant Patient Reported? Taking?   Dextromethorphan-Quinidine (NUEDEXTA) 20-10 MG Cap 4/9/2020 at Unknown time MAR from Other Facility Yes No   Sig: Take 1 Tab by mouth 2 Times a Day.   amitriptyline (ELAVIL) 50 MG Tab 4/8/2020 at Unknown time  Yes Yes   Sig: Take 50 mg by mouth every evening.   amlodipine (NORVASC) 5 MG Tab 4/9/2020 at Unknown time MAR from Other Facility Yes No   Sig: Take 10 mg by mouth every day.   atenolol (TENORMIN) 100 MG Tab 4/9/2020 at Unknown time MAR from Other Facility Yes No   Sig: Take 100 mg by mouth every day.   dipyridamole-aspirin (AGGRENOX)  MG CAPSULE SR 12 HR 4/9/2020 at Unknown time MAR from Other Facility Yes No   Sig: Take 1 Cap by mouth 2 times a day.   glipiZIDE (GLUCOTROL) 10 MG Tab 4/9/2020 at Unknown time  Yes Yes   Sig: Take 10 mg by mouth 2 times a day.   hydrALAZINE (APRESOLINE) 50 MG Tab 4/9/2020 at Unknown time MAR  from Other Facility Yes No   Sig: Take 100 mg by mouth 2 Times a Day.   insulin NPH (HUMULIN/NOVOLIN) 100 UNIT/ML Suspension 4/9/2020 at Unknown time  Yes Yes   Sig: Inject 14 Units as instructed every morning.   insulin NPH (HUMULIN/NOVOLIN) 100 UNIT/ML Suspension 4/8/2020 at Unknown time  Yes Yes   Sig: Inject 5 Units as instructed every evening.   insulin regular (HUMULIN R) 100 Unit/mL Solution 4/8/2020 at Unknown time  Yes Yes   Sig: Inject 2-10 Units as instructed 2 times a day as needed for High Blood Sugar.   isosorbide mononitrate SR (IMDUR) 30 MG TABLET SR 24 HR 4/9/2020 at Unknown time MAR from Other Facility Yes No   Sig: Take 30 mg by mouth 2 Times a Day.   losartan (COZAAR) 50 MG Tab 4/9/2020 at Unknown time MAR from Other Facility Yes No   Sig: Take 50 mg by mouth every day.   lovastatin (MEVACOR) 40 MG tablet 4/9/2020 at Unknown time MAR from Other Facility Yes No   Sig: Take 50 mg by mouth every evening.   omeprazole (PRILOSEC OTC) 20 MG tablet 4/9/2020 at Unknown time MAR from Other Facility Yes No   Sig: Take 20 mg by mouth every day.   oxycodone (OXY-IR) 15 MG immediate release tablet 4/9/2020 at Unknown time MAR from Other Facility Yes No   Sig: Take 15 mg by mouth every four hours as needed for Severe Pain.   spironolactone (ALDACTONE) 50 MG Tab 4/9/2020 at Unknown time MAR from Other Facility Yes No   Sig: Take 50 mg by mouth 2 times a day.      Facility-Administered Medications: None       Current Facility-Administered Medications   Medication Dose Route Frequency Provider Last Rate Last Dose   • oxyCODONE immediate-release (ROXICODONE) tablet 15 mg  15 mg Oral Q4HRS PRN H Murali Maciel M.D.   15 mg at 04/11/20 1143   • senna-docusate (PERICOLACE or SENOKOT S) 8.6-50 MG per tablet 2 Tab  2 Tab Oral BID Gisela Vora M.D.   2 Tab at 04/11/20 0602    And   • polyethylene glycol/lytes (MIRALAX) PACKET 1 Packet  1 Packet Oral QDAY PRN Sansan Vora, M.D.        And   • magnesium hydroxide (MILK OF  MAGNESIA) suspension 30 mL  30 mL Oral QDAY PRN Gisela Vora M.D.        And   • bisacodyl (DULCOLAX) suppository 10 mg  10 mg Rectal QDAY PRN Gisela Vora M.D.       • NS infusion   Intravenous Continuous Gisela Vora M.D. 83 mL/hr at 04/11/20 1243     • acetaminophen (TYLENOL) tablet 650 mg  650 mg Oral Q6HRS PRN Gisela Vora M.D.   650 mg at 04/10/20 0922   • ondansetron (ZOFRAN) syringe/vial injection 4 mg  4 mg Intravenous Q4HRS PRN Gisela Vora M.D.       • ondansetron (ZOFRAN ODT) dispertab 4 mg  4 mg Oral Q4HRS PRKAHLIL Voar M.D.       • promethazine (PHENERGAN) tablet 12.5-25 mg  12.5-25 mg Oral Q4HRS LOU Vora M.D.       • promethazine (PHENERGAN) suppository 12.5-25 mg  12.5-25 mg Rectal Q4HRS PRKAHLIL Vora M.D.       • prochlorperazine (COMPAZINE) injection 5-10 mg  5-10 mg Intravenous Q4HRS PRKAHLIL Vora M.D.       • amitriptyline (ELAVIL) tablet 50 mg  50 mg Oral Nightly Gisela Vora M.D.   50 mg at 04/10/20 2115   • amLODIPine (NORVASC) tablet 10 mg  10 mg Oral DAILY Gisela Vora M.D.   10 mg at 04/11/20 0602   • atenolol (TENORMIN) tablet 100 mg  100 mg Oral DAILY Gisela Vora M.D.   100 mg at 04/11/20 0602   • aspirin-dipyridamole (AGGRENOX)  MG 1 Cap  1 Cap Oral BID Gisela Vora M.D.   1 Cap at 04/11/20 0602   • hydrALAZINE (APRESOLINE) tablet 100 mg  100 mg Oral BID Gisela Vora M.D.   100 mg at 04/11/20 0602   • insulin NPH (HUMULIN/NOVOLIN) injection 14 Units  14 Units Subcutaneous JOSE Ta.D.   14 Units at 04/11/20 0609   • insulin NPH (HUMULIN/NOVOLIN) injection 5 Units  5 Units Subcutaneous Q EVENING Gisela Vora M.D.   5 Units at 04/10/20 2151   • isosorbide mononitrate SR (IMDUR) tablet 30 mg  30 mg Oral BID Gisela Vora M.D.   30 mg at 04/11/20 0602   • losartan (COZAAR) tablet 50 mg  50 mg Oral DAILY Gisela Vora M.D.   50 mg at 04/11/20 0603   • lovastatin (MEVACOR) tablet 50 mg  50 mg Oral Nightly Gisela Vora M.D.   50 mg at 04/10/20 2114   • spironolactone (ALDACTONE)  tablet 50 mg  50 mg Oral BID Gisela Vora M.D.   50 mg at 04/11/20 0603   • omeprazole (PRILOSEC) capsule 20 mg  20 mg Oral DAILY Gisela Vora M.D.   20 mg at 04/11/20 0602   • morphine (pf) 4 MG/ML injection 1-2 mg  1-2 mg Intravenous Q4HRS PRN Gisela Vora M.D.       • guaiFENesin (ROBITUSSIN) 100 MG/5ML solution 200 mg  10 mL Oral Q4HRS PRN Gisela Vora M.D.       • piperacillin-tazobactam (ZOSYN) 4.5 g in  mL IVPB  4.5 g Intravenous Q8HRS Gisela Vora M.D. 25 mL/hr at 04/11/20 1242 4.5 g at 04/11/20 1242       Allergies  No Known Allergies      Physical Exam  Temp:  [36.4 °C (97.5 °F)-37.1 °C (98.7 °F)] 36.5 °C (97.7 °F)  Pulse:  [63-80] 64  Resp:  [16] 16  BP: (102-158)/(50-71) 158/71  SpO2:  [93 %-95 %] 95 %    Pulse/Extremity Exam:    Femorals:        Right: palpable       Left palpable  Popliteals:       Right monphasic  Pedal Pulses:       Right DP monophasic       Right PT monophasic  Wounds: photos of right foot and leg reviewed.     General appearance: NAD, conversing appropriately  Psych: Normal affect, mood, judgement  Neuro: CN II-XII grossly intact.   Neck: full range of motion  Lungs: No inspiratory stridor or wheezing  CV: RRR  Abdomen: Soft, NT/ND  Skin: No rashes    Labs Reviewed Today:  Recent Labs     04/10/20  0425 04/11/20  0332   WBC 12.8* 10.3   RBC 3.85* 3.96*   HEMOGLOBIN 10.0* 10.2*   HEMATOCRIT 30.0* 30.9*   MCV 77.9* 78.0*   MCH 26.0* 25.8*   MCHC 33.3* 33.0*   RDW 41.1 41.2   PLATELETCT 320 298   MPV 9.3 9.3     Recent Labs     04/10/20  0425 04/11/20  0332   SODIUM 130* 126*   POTASSIUM 4.3 4.5   CHLORIDE 94* 91*   CO2 22 23   GLUCOSE 109* 151*   BUN 22 14   CREATININE 0.96 0.82   CALCIUM 9.3 9.4     Recent Labs     04/10/20  0425 04/11/20  0332   ALTSGPT  --  9   ASTSGOT  --  7*   ALKPHOSPHAT  --  77   TBILIRUBIN  --  0.5   GLUCOSE 109* 151*     Recent Labs     04/10/20  0425   APTT 35.1   INR 1.22*             No results for input(s): TROPONINI in the last 72 hours.    Urinalysis:     No results found     Imaging Reviewed Today:  I personally reviewed all non-invasive vascular testing including images, x-rays, tracings, arterial waveforms, and duplex exams relevant to this admission. My interpretation is below:    Assessment/Plan & Medical Decision-Making    This unfortunate inmate presents with multiple wounds on his remaining leg. Previously, I spoke with Dr. Us who had wound vac placed on the multiple wounds and I know arterial evaluation was performed, but these results are not available.  I will repeat the lower extremity arterial imaging to determine if there is a possible method for salvage.      Veronica Lebron MD  Vascular Surgeon  [facility]  Office: [phone]

## 2020-04-11 NOTE — PROGRESS NOTES
Hospital Medicine Daily Progress Note    Date of Service  4/11/2020    Chief Complaint  64 y.o. male admitted 4/9/2020 with Leg wound       Hospital Course    64 y.o. male who presented 4/9/2020 with stroke, DM, HTN who presented from correctional facility with left diabetic foot ulcer that has been not healing despite antibiotics and wound Vac.  His documents show that his wound culture was growing Pseudomonas and enterococcus and he was treated with oral antibiotics.  He is unsure if he is still on antibiotics.  Dr. Lebron was consulted who recommended admission for debridement.  He complains of pain around the wound.  He denies fevers or chills.    Interval Problem Update  Awaiting vascular surgery recommendations    Consultants/Specialty  Vascular surgery    Code Status  full    Disposition  pending    Review of Systems  Review of Systems   Constitutional: Negative for chills, diaphoresis, fever and weight loss.   HENT: Negative for ear discharge, ear pain and nosebleeds.    Eyes: Negative for double vision, photophobia and pain.   Respiratory: Negative for hemoptysis, sputum production and shortness of breath.    Cardiovascular: Negative for chest pain, palpitations and orthopnea.   Gastrointestinal: Negative for abdominal pain, nausea and vomiting.   Genitourinary: Negative for frequency, hematuria and urgency.   Musculoskeletal: Negative for back pain and neck pain.   Skin: Negative for itching.   Neurological: Negative for tingling, tremors and headaches.        Physical Exam  Temp:  [36.4 °C (97.5 °F)-37.1 °C (98.7 °F)] 36.4 °C (97.6 °F)  Pulse:  [65-80] 72  Resp:  [16] 16  BP: (106-134)/(50-66) 134/65  SpO2:  [93 %-95 %] 95 %    Physical Exam  Constitutional:       General: He is not in acute distress.     Appearance: He is not toxic-appearing.   HENT:      Head: Normocephalic and atraumatic.      Nose: No congestion or rhinorrhea.      Mouth/Throat:      Mouth: Mucous membranes are dry.   Eyes:       Conjunctiva/sclera: Conjunctivae normal.   Neck:      Musculoskeletal: No neck rigidity or muscular tenderness.   Cardiovascular:      Rate and Rhythm: Normal rate and regular rhythm.      Heart sounds: No murmur. No friction rub.   Pulmonary:      Effort: No respiratory distress.      Breath sounds: No stridor.   Abdominal:      General: There is no distension.      Tenderness: There is no abdominal tenderness. There is no guarding.   Musculoskeletal: Normal range of motion.      Comments: Eschar and ulcers are noted on the  Lateral side of the right foot   Skin:     Coloration: Skin is not jaundiced or pale.   Neurological:      General: No focal deficit present.         Fluids    Intake/Output Summary (Last 24 hours) at 4/11/2020 0908  Last data filed at 4/11/2020 0817  Gross per 24 hour   Intake 2246.22 ml   Output 1450 ml   Net 796.22 ml       Laboratory  Recent Labs     04/10/20  0425 04/11/20  0332   WBC 12.8* 10.3   RBC 3.85* 3.96*   HEMOGLOBIN 10.0* 10.2*   HEMATOCRIT 30.0* 30.9*   MCV 77.9* 78.0*   MCH 26.0* 25.8*   MCHC 33.3* 33.0*   RDW 41.1 41.2   PLATELETCT 320 298   MPV 9.3 9.3     Recent Labs     04/10/20  0425 04/11/20  0332   SODIUM 130* 126*   POTASSIUM 4.3 4.5   CHLORIDE 94* 91*   CO2 22 23   GLUCOSE 109* 151*   BUN 22 14   CREATININE 0.96 0.82   CALCIUM 9.3 9.4     Recent Labs     04/10/20  0425   APTT 35.1   INR 1.22*               Imaging       Assessment/Plan  * Diabetic foot ulcer (HCC)- (present on admission)  Assessment & Plan  Not healing despite oral antibiotics and wound VAC  Wound was previously growing enterococcus and Pseudomonas  Wound culture without any growth so far   on Zosyn for now  D/w dr padgett    PAD (peripheral artery disease) (HCC)- (present on admission)  Assessment & Plan  Awaiting vascular surgery/dr padgett's  input    Dysphagia as late effect of stroke- (present on admission)  Assessment & Plan  History of    History of stroke- (present on admission)  Assessment &  Plan  With residual left-sided weakness and dysphasia  aggrenox  lovastatin    HTN (hypertension)- (present on admission)  Assessment & Plan  Mildly hypertensive   continue on Norvasc, hydralazine, Imdur, atenolol, losartan    DM (diabetes mellitus) (HCC)- (present on admission)  Assessment & Plan  Continue on NPH and ISS  hema A1c is 7.1  accu checks are noted  Diabetic diet       VTE prophylaxis: scd

## 2020-04-11 NOTE — PROGRESS NOTES
"Bedside report received.  Assessment complete.  A&O x 4. Patient calls appropriately.  Patient ambulates with two assist and wheelchair at baseline. Bed alarm off.   Patient has 2/10 pain. Pain managed with prescribed medications.  Denies N&V. NPO diet.  Wounds clean and dry  + void, + flatus, + BM.  Patient denies SOB.  SCD's off.  Patient is calm and cooperative.  Review plan with of care with patient. Call light and personal belongings with in reach. Hourly rounding in place. All needs met at this time.  /65   Pulse 72   Temp 36.4 °C (97.6 °F) (Temporal)   Resp 16   Ht 1.708 m (5' 7.24\")   Wt 85.8 kg (189 lb 2.5 oz)   SpO2 95%   BMI 29.41 kg/m²     "

## 2020-04-11 NOTE — PROGRESS NOTES
Assumed care of patient at this time.   Pt A &O x4   VSS. On room air.   Complaints of pain to Right foot. PRN Oxycodone given for pain.   Takes meds whole with applesauce.   IVF NS  Infusing at 83 mL/hr   NPO after midnight for vascular surgery consult.     2 guards a bedside overnight.     Nanette Jefferson R.N.

## 2020-04-12 LAB
ALBUMIN SERPL BCP-MCNC: 3.2 G/DL (ref 3.2–4.9)
ALBUMIN/GLOB SERPL: 0.8 G/DL
ALP SERPL-CCNC: 73 U/L (ref 30–99)
ALT SERPL-CCNC: 8 U/L (ref 2–50)
ANION GAP SERPL CALC-SCNC: 13 MMOL/L (ref 7–16)
AST SERPL-CCNC: 8 U/L (ref 12–45)
BACTERIA WND AEROBE CULT: ABNORMAL
BACTERIA WND AEROBE CULT: ABNORMAL
BASOPHILS # BLD AUTO: 0.4 % (ref 0–1.8)
BASOPHILS # BLD: 0.04 K/UL (ref 0–0.12)
BILIRUB SERPL-MCNC: 0.3 MG/DL (ref 0.1–1.5)
BUN SERPL-MCNC: 12 MG/DL (ref 8–22)
CALCIUM SERPL-MCNC: 9 MG/DL (ref 8.5–10.5)
CHLORIDE SERPL-SCNC: 94 MMOL/L (ref 96–112)
CO2 SERPL-SCNC: 21 MMOL/L (ref 20–33)
CREAT SERPL-MCNC: 0.67 MG/DL (ref 0.5–1.4)
EOSINOPHIL # BLD AUTO: 0.15 K/UL (ref 0–0.51)
EOSINOPHIL NFR BLD: 1.6 % (ref 0–6.9)
ERYTHROCYTE [DISTWIDTH] IN BLOOD BY AUTOMATED COUNT: 41.5 FL (ref 35.9–50)
GLOBULIN SER CALC-MCNC: 4.2 G/DL (ref 1.9–3.5)
GLUCOSE BLD-MCNC: 196 MG/DL (ref 65–99)
GLUCOSE SERPL-MCNC: 217 MG/DL (ref 65–99)
GRAM STN SPEC: ABNORMAL
HCT VFR BLD AUTO: 29.3 % (ref 42–52)
HGB BLD-MCNC: 9.6 G/DL (ref 14–18)
IMM GRANULOCYTES # BLD AUTO: 0.04 K/UL (ref 0–0.11)
IMM GRANULOCYTES NFR BLD AUTO: 0.4 % (ref 0–0.9)
LYMPHOCYTES # BLD AUTO: 2.23 K/UL (ref 1–4.8)
LYMPHOCYTES NFR BLD: 23.8 % (ref 22–41)
MCH RBC QN AUTO: 25.8 PG (ref 27–33)
MCHC RBC AUTO-ENTMCNC: 32.8 G/DL (ref 33.7–35.3)
MCV RBC AUTO: 78.8 FL (ref 81.4–97.8)
MONOCYTES # BLD AUTO: 0.81 K/UL (ref 0–0.85)
MONOCYTES NFR BLD AUTO: 8.7 % (ref 0–13.4)
NEUTROPHILS # BLD AUTO: 6.09 K/UL (ref 1.82–7.42)
NEUTROPHILS NFR BLD: 65.1 % (ref 44–72)
NRBC # BLD AUTO: 0 K/UL
NRBC BLD-RTO: 0 /100 WBC
PLATELET # BLD AUTO: 284 K/UL (ref 164–446)
PMV BLD AUTO: 9.4 FL (ref 9–12.9)
POTASSIUM SERPL-SCNC: 4.1 MMOL/L (ref 3.6–5.5)
PROT SERPL-MCNC: 7.4 G/DL (ref 6–8.2)
RBC # BLD AUTO: 3.72 M/UL (ref 4.7–6.1)
SIGNIFICANT IND 70042: ABNORMAL
SITE SITE: ABNORMAL
SODIUM SERPL-SCNC: 128 MMOL/L (ref 135–145)
SOURCE SOURCE: ABNORMAL
WBC # BLD AUTO: 9.4 K/UL (ref 4.8–10.8)

## 2020-04-12 PROCEDURE — 700111 HCHG RX REV CODE 636 W/ 250 OVERRIDE (IP): Performed by: INTERNAL MEDICINE

## 2020-04-12 PROCEDURE — 770001 HCHG ROOM/CARE - MED/SURG/GYN PRIV*

## 2020-04-12 PROCEDURE — 85025 COMPLETE CBC W/AUTO DIFF WBC: CPT

## 2020-04-12 PROCEDURE — 700102 HCHG RX REV CODE 250 W/ 637 OVERRIDE(OP): Performed by: INTERNAL MEDICINE

## 2020-04-12 PROCEDURE — 700102 HCHG RX REV CODE 250 W/ 637 OVERRIDE(OP): Performed by: FAMILY MEDICINE

## 2020-04-12 PROCEDURE — 36415 COLL VENOUS BLD VENIPUNCTURE: CPT

## 2020-04-12 PROCEDURE — A9270 NON-COVERED ITEM OR SERVICE: HCPCS | Performed by: FAMILY MEDICINE

## 2020-04-12 PROCEDURE — 700105 HCHG RX REV CODE 258: Performed by: INTERNAL MEDICINE

## 2020-04-12 PROCEDURE — 82962 GLUCOSE BLOOD TEST: CPT

## 2020-04-12 PROCEDURE — 80053 COMPREHEN METABOLIC PANEL: CPT

## 2020-04-12 PROCEDURE — A9270 NON-COVERED ITEM OR SERVICE: HCPCS | Performed by: INTERNAL MEDICINE

## 2020-04-12 PROCEDURE — 99232 SBSQ HOSP IP/OBS MODERATE 35: CPT | Performed by: FAMILY MEDICINE

## 2020-04-12 RX ADMIN — SPIRONOLACTONE 50 MG: 50 TABLET ORAL at 17:30

## 2020-04-12 RX ADMIN — LOSARTAN POTASSIUM 50 MG: 50 TABLET, FILM COATED ORAL at 05:47

## 2020-04-12 RX ADMIN — HYDRALAZINE HYDROCHLORIDE 100 MG: 25 TABLET, FILM COATED ORAL at 17:31

## 2020-04-12 RX ADMIN — SODIUM CHLORIDE: 9 INJECTION, SOLUTION INTRAVENOUS at 17:30

## 2020-04-12 RX ADMIN — OXYCODONE HYDROCHLORIDE 15 MG: 10 TABLET ORAL at 21:32

## 2020-04-12 RX ADMIN — HYDRALAZINE HYDROCHLORIDE 100 MG: 25 TABLET, FILM COATED ORAL at 05:46

## 2020-04-12 RX ADMIN — OXYCODONE HYDROCHLORIDE 15 MG: 10 TABLET ORAL at 09:10

## 2020-04-12 RX ADMIN — LOVASTATIN 50 MG: 20 TABLET ORAL at 21:32

## 2020-04-12 RX ADMIN — OXYCODONE HYDROCHLORIDE 15 MG: 10 TABLET ORAL at 13:11

## 2020-04-12 RX ADMIN — ISOSORBIDE MONONITRATE 30 MG: 30 TABLET, EXTENDED RELEASE ORAL at 17:30

## 2020-04-12 RX ADMIN — PIPERACILLIN AND TAZOBACTAM 4.5 G: 4; .5 INJECTION, POWDER, LYOPHILIZED, FOR SOLUTION INTRAVENOUS; PARENTERAL at 05:44

## 2020-04-12 RX ADMIN — ACETAMINOPHEN 650 MG: 325 TABLET, FILM COATED ORAL at 21:32

## 2020-04-12 RX ADMIN — INSULIN HUMAN 14 UNITS: 100 INJECTION, SUSPENSION SUBCUTANEOUS at 06:13

## 2020-04-12 RX ADMIN — ASPIRIN AND DIPYRIDAMOLE 1 CAPSULE: 25; 200 CAPSULE, EXTENDED RELEASE ORAL at 17:31

## 2020-04-12 RX ADMIN — INSULIN HUMAN 5 UNITS: 100 INJECTION, SUSPENSION SUBCUTANEOUS at 17:19

## 2020-04-12 RX ADMIN — PIPERACILLIN AND TAZOBACTAM 4.5 G: 4; .5 INJECTION, POWDER, LYOPHILIZED, FOR SOLUTION INTRAVENOUS; PARENTERAL at 13:18

## 2020-04-12 RX ADMIN — ISOSORBIDE MONONITRATE 30 MG: 30 TABLET, EXTENDED RELEASE ORAL at 05:47

## 2020-04-12 RX ADMIN — OXYCODONE HYDROCHLORIDE 15 MG: 10 TABLET ORAL at 17:31

## 2020-04-12 RX ADMIN — SPIRONOLACTONE 50 MG: 50 TABLET ORAL at 05:47

## 2020-04-12 RX ADMIN — SENNOSIDES AND DOCUSATE SODIUM 2 TABLET: 8.6; 5 TABLET ORAL at 17:30

## 2020-04-12 RX ADMIN — SENNOSIDES AND DOCUSATE SODIUM 2 TABLET: 8.6; 5 TABLET ORAL at 05:47

## 2020-04-12 RX ADMIN — ATENOLOL 100 MG: 50 TABLET ORAL at 05:46

## 2020-04-12 RX ADMIN — OMEPRAZOLE 20 MG: 20 CAPSULE, DELAYED RELEASE ORAL at 05:47

## 2020-04-12 RX ADMIN — PIPERACILLIN AND TAZOBACTAM 4.5 G: 4; .5 INJECTION, POWDER, LYOPHILIZED, FOR SOLUTION INTRAVENOUS; PARENTERAL at 21:32

## 2020-04-12 RX ADMIN — AMLODIPINE BESYLATE 10 MG: 10 TABLET ORAL at 05:47

## 2020-04-12 RX ADMIN — ASPIRIN AND DIPYRIDAMOLE 1 CAPSULE: 25; 200 CAPSULE, EXTENDED RELEASE ORAL at 06:17

## 2020-04-12 RX ADMIN — AMITRIPTYLINE HYDROCHLORIDE 50 MG: 25 TABLET, FILM COATED ORAL at 21:32

## 2020-04-12 ASSESSMENT — ENCOUNTER SYMPTOMS
ORTHOPNEA: 0
DIZZINESS: 0
DOUBLE VISION: 0
COUGH: 0
NECK PAIN: 0
BLURRED VISION: 0
FEVER: 0
SPUTUM PRODUCTION: 0
HEADACHES: 0
HEMOPTYSIS: 0
NAUSEA: 0
PALPITATIONS: 0
WEIGHT LOSS: 0
BACK PAIN: 0
CHILLS: 0
HEARTBURN: 0
TINGLING: 0
VOMITING: 0
PHOTOPHOBIA: 0

## 2020-04-12 NOTE — PROGRESS NOTES
"Bedside report received.  Assessment complete.  A&O x 4. Patient calls appropriately.  Patient ambulates with two assist and wheelchair. Bed alarm on.   Patient has 4-7/10 pain. Pain managed with prescribed medications.  Denies N&V. Tolerating ADA diet.  Wounds clean and dry.  + void, + flatus, + bowel sounds for BM.  Patient denies SOB.  SCD's off.  Patient is calm and cooperative.  Review plan with of care with patient. Call light and personal belongings with in reach. Hourly rounding in place. All needs met at this time.  /57   Pulse 66   Temp 36.2 °C (97.2 °F) (Temporal)   Resp 18   Ht 1.708 m (5' 7.24\")   Wt 85.8 kg (189 lb 2.5 oz)   SpO2 98%   BMI 29.41 kg/m²     "

## 2020-04-12 NOTE — CARE PLAN
Problem: Communication  Goal: The ability to communicate needs accurately and effectively will improve  Outcome: PROGRESSING AS EXPECTED     Problem: Safety  Goal: Will remain free from injury  Outcome: PROGRESSING AS EXPECTED  Goal: Will remain free from falls  Outcome: PROGRESSING AS EXPECTED     Problem: Infection  Goal: Will remain free from infection  Outcome: PROGRESSING AS EXPECTED     Problem: Knowledge Deficit  Goal: Knowledge of disease process/condition, treatment plan, diagnostic tests, and medications will improve  Outcome: PROGRESSING AS EXPECTED  Goal: Knowledge of the prescribed therapeutic regimen will improve  Outcome: PROGRESSING AS EXPECTED     Problem: Pain Management  Goal: Pain level will decrease to patient's comfort goal  Outcome: PROGRESSING AS EXPECTED

## 2020-04-12 NOTE — PROGRESS NOTES
Progress Note    The arterial duplex examination is reviewed and really looks dismal.  I think an angiogram is indicated before amputation and will try to arrange that for tomorrow.        Veronica Lebron MD  Vascular Surgeon  [facility]  Office: [phone]

## 2020-04-12 NOTE — PROGRESS NOTES
Hospital Medicine Daily Progress Note    Date of Service  4/12/2020    Chief Complaint  64 y.o. male admitted 4/9/2020 with Leg wound       Hospital Course    64 y.o. male who presented 4/9/2020 with stroke, DM, HTN who presented from correctional facility with left diabetic foot ulcer that has been not healing despite antibiotics and wound Vac.  His documents show that his wound culture was growing Pseudomonas and enterococcus and he was treated with oral antibiotics.  He is unsure if he is still on antibiotics.  Dr. Lebron was consulted who recommended admission for debridement.  He complains of pain around the wound.  He denies fevers or chills.    Interval Problem Update  Wound culture with pseudomonas growth    Consultants/Specialty  Vascular surgery    Code Status  full    Disposition  pending    Review of Systems  Review of Systems   Constitutional: Negative for chills, fever and weight loss.   HENT: Negative for ear discharge, ear pain and tinnitus.    Eyes: Negative for blurred vision, double vision and photophobia.   Respiratory: Negative for cough, hemoptysis and sputum production.    Cardiovascular: Negative for chest pain, palpitations and orthopnea.   Gastrointestinal: Negative for heartburn, nausea and vomiting.   Genitourinary: Negative for dysuria, frequency and urgency.   Musculoskeletal: Negative for back pain and neck pain.   Skin: Negative for itching.   Neurological: Negative for dizziness, tingling and headaches.        Physical Exam  Temp:  [36.2 °C (97.2 °F)-36.9 °C (98.5 °F)] 36.2 °C (97.2 °F)  Pulse:  [63-69] 66  Resp:  [16-19] 18  BP: (102-158)/(50-71) 111/57  SpO2:  [94 %-98 %] 98 %    Physical Exam  Constitutional:       Appearance: He is not toxic-appearing or diaphoretic.   HENT:      Head: Normocephalic and atraumatic.      Nose: Nose normal.      Mouth/Throat:      Mouth: Mucous membranes are dry.   Eyes:      Extraocular Movements: Extraocular movements intact.      Pupils:  Pupils are equal, round, and reactive to light.   Neck:      Musculoskeletal: Normal range of motion and neck supple.   Cardiovascular:      Rate and Rhythm: Normal rate and regular rhythm.      Pulses: Normal pulses.      Heart sounds: Normal heart sounds.   Pulmonary:      Effort: Pulmonary effort is normal.      Breath sounds: Normal breath sounds.   Abdominal:      General: Bowel sounds are normal.      Palpations: Abdomen is soft.   Musculoskeletal: Normal range of motion.      Comments: Eschar and ulcers are noted on the  Lateral side of the right foot   Skin:     General: Skin is warm and dry.   Neurological:      General: No focal deficit present.      Mental Status: He is alert. Mental status is at baseline.         Fluids    Intake/Output Summary (Last 24 hours) at 4/12/2020 0850  Last data filed at 4/12/2020 0309  Gross per 24 hour   Intake 385 ml   Output 2000 ml   Net -1615 ml       Laboratory  Recent Labs     04/10/20  0425 04/11/20  0332 04/12/20  0356   WBC 12.8* 10.3 9.4   RBC 3.85* 3.96* 3.72*   HEMOGLOBIN 10.0* 10.2* 9.6*   HEMATOCRIT 30.0* 30.9* 29.3*   MCV 77.9* 78.0* 78.8*   MCH 26.0* 25.8* 25.8*   MCHC 33.3* 33.0* 32.8*   RDW 41.1 41.2 41.5   PLATELETCT 320 298 284   MPV 9.3 9.3 9.4     Recent Labs     04/10/20  0425 04/11/20  0332 04/12/20  0356   SODIUM 130* 126* 128*   POTASSIUM 4.3 4.5 4.1   CHLORIDE 94* 91* 94*   CO2 22 23 21   GLUCOSE 109* 151* 217*   BUN 22 14 12   CREATININE 0.96 0.82 0.67   CALCIUM 9.3 9.4 9.0     Recent Labs     04/10/20  0425   APTT 35.1   INR 1.22*               Imaging       Assessment/Plan  * Diabetic foot ulcer (HCC)- (present on admission)  Assessment & Plan  Not healing despite oral antibiotics and wound VAC  Wound was previously growing enterococcus and Pseudomonas  Wound culture with pseudomonas   on Zosyn for now  Vascular surgery input is noted    PAD (peripheral artery disease) (HCC)- (present on admission)  Assessment & Plan  Awaiting vascular surgery/  lorin's  input    Dysphagia as late effect of stroke- (present on admission)  Assessment & Plan  History of    History of stroke- (present on admission)  Assessment & Plan  With residual left-sided weakness and dysphasia  aggrenox  lovastatin    HTN (hypertension)- (present on admission)  Assessment & Plan  Will monitor  continue on Norvasc, hydralazine, Imdur, atenolol, losartan    DM (diabetes mellitus) (HCC)- (present on admission)  Assessment & Plan  Continue on NPH and ISS  hema A1c is 7.1  accu checks are noted  Diabetic diet       VTE prophylaxis: scd

## 2020-04-13 ENCOUNTER — APPOINTMENT (OUTPATIENT)
Dept: RADIOLOGY | Facility: MEDICAL CENTER | Age: 65
DRG: 617 | End: 2020-04-13
Attending: SURGERY
Payer: MEDICAID

## 2020-04-13 ENCOUNTER — APPOINTMENT (OUTPATIENT)
Dept: RADIOLOGY | Facility: MEDICAL CENTER | Age: 65
DRG: 617 | End: 2020-04-13
Attending: FAMILY MEDICINE
Payer: MEDICAID

## 2020-04-13 LAB
GLUCOSE BLD-MCNC: 149 MG/DL (ref 65–99)
GLUCOSE BLD-MCNC: 156 MG/DL (ref 65–99)
IRON SATN MFR SERPL: 18 % (ref 15–55)
IRON SERPL-MCNC: 28 UG/DL (ref 50–180)
TIBC SERPL-MCNC: 155 UG/DL (ref 250–450)
UIBC SERPL-MCNC: 127 UG/DL (ref 110–370)

## 2020-04-13 PROCEDURE — 82962 GLUCOSE BLOOD TEST: CPT

## 2020-04-13 PROCEDURE — 99222 1ST HOSP IP/OBS MODERATE 55: CPT | Performed by: NURSE PRACTITIONER

## 2020-04-13 PROCEDURE — A9270 NON-COVERED ITEM OR SERVICE: HCPCS | Performed by: INTERNAL MEDICINE

## 2020-04-13 PROCEDURE — 700102 HCHG RX REV CODE 250 W/ 637 OVERRIDE(OP): Performed by: SURGERY

## 2020-04-13 PROCEDURE — 700102 HCHG RX REV CODE 250 W/ 637 OVERRIDE(OP): Performed by: INTERNAL MEDICINE

## 2020-04-13 PROCEDURE — B410ZZZ FLUOROSCOPY OF ABDOMINAL AORTA: ICD-10-PCS | Performed by: SURGERY

## 2020-04-13 PROCEDURE — 99232 SBSQ HOSP IP/OBS MODERATE 35: CPT | Performed by: FAMILY MEDICINE

## 2020-04-13 PROCEDURE — 700111 HCHG RX REV CODE 636 W/ 250 OVERRIDE (IP)

## 2020-04-13 PROCEDURE — A9270 NON-COVERED ITEM OR SERVICE: HCPCS | Performed by: FAMILY MEDICINE

## 2020-04-13 PROCEDURE — 92610 EVALUATE SWALLOWING FUNCTION: CPT

## 2020-04-13 PROCEDURE — 36415 COLL VENOUS BLD VENIPUNCTURE: CPT

## 2020-04-13 PROCEDURE — B41CZZZ FLUOROSCOPY OF PELVIC ARTERIES: ICD-10-PCS | Performed by: SURGERY

## 2020-04-13 PROCEDURE — 700117 HCHG RX CONTRAST REV CODE 255: Performed by: SURGERY

## 2020-04-13 PROCEDURE — A9270 NON-COVERED ITEM OR SERVICE: HCPCS | Performed by: SURGERY

## 2020-04-13 PROCEDURE — 83550 IRON BINDING TEST: CPT

## 2020-04-13 PROCEDURE — B41FZZZ FLUOROSCOPY OF RIGHT LOWER EXTREMITY ARTERIES: ICD-10-PCS | Performed by: SURGERY

## 2020-04-13 PROCEDURE — 700102 HCHG RX REV CODE 250 W/ 637 OVERRIDE(OP): Performed by: FAMILY MEDICINE

## 2020-04-13 PROCEDURE — 700111 HCHG RX REV CODE 636 W/ 250 OVERRIDE (IP): Performed by: INTERNAL MEDICINE

## 2020-04-13 PROCEDURE — 047M3ZZ DILATION OF RIGHT POPLITEAL ARTERY, PERCUTANEOUS APPROACH: ICD-10-PCS | Performed by: SURGERY

## 2020-04-13 PROCEDURE — 700105 HCHG RX REV CODE 258: Performed by: INTERNAL MEDICINE

## 2020-04-13 PROCEDURE — 99153 MOD SED SAME PHYS/QHP EA: CPT

## 2020-04-13 PROCEDURE — 770001 HCHG ROOM/CARE - MED/SURG/GYN PRIV*

## 2020-04-13 PROCEDURE — 700111 HCHG RX REV CODE 636 W/ 250 OVERRIDE (IP): Performed by: SURGERY

## 2020-04-13 PROCEDURE — 83540 ASSAY OF IRON: CPT

## 2020-04-13 PROCEDURE — 75630 X-RAY AORTA LEG ARTERIES: CPT

## 2020-04-13 RX ORDER — HEPARIN SODIUM,PORCINE 1000/ML
VIAL (ML) INJECTION
Status: COMPLETED
Start: 2020-04-13 | End: 2020-04-13

## 2020-04-13 RX ORDER — MIDAZOLAM HYDROCHLORIDE 1 MG/ML
.5-2 INJECTION INTRAMUSCULAR; INTRAVENOUS PRN
Status: ACTIVE | OUTPATIENT
Start: 2020-04-13 | End: 2020-04-13

## 2020-04-13 RX ORDER — MIDAZOLAM HYDROCHLORIDE 1 MG/ML
INJECTION INTRAMUSCULAR; INTRAVENOUS
Status: COMPLETED
Start: 2020-04-13 | End: 2020-04-13

## 2020-04-13 RX ORDER — SODIUM CHLORIDE 9 MG/ML
500 INJECTION, SOLUTION INTRAVENOUS
Status: ACTIVE | OUTPATIENT
Start: 2020-04-13 | End: 2020-04-13

## 2020-04-13 RX ORDER — PROTAMINE SULFATE 10 MG/ML
30 INJECTION, SOLUTION INTRAVENOUS ONCE
Status: COMPLETED | OUTPATIENT
Start: 2020-04-13 | End: 2020-04-13

## 2020-04-13 RX ORDER — CLOPIDOGREL BISULFATE 75 MG/1
75 TABLET ORAL DAILY
Status: DISCONTINUED | OUTPATIENT
Start: 2020-04-13 | End: 2020-04-21 | Stop reason: HOSPADM

## 2020-04-13 RX ORDER — IODIXANOL 270 MG/ML
45 INJECTION, SOLUTION INTRAVASCULAR ONCE
Status: COMPLETED | OUTPATIENT
Start: 2020-04-13 | End: 2020-04-13

## 2020-04-13 RX ORDER — PROTAMINE SULFATE 10 MG/ML
INJECTION, SOLUTION INTRAVENOUS
Status: COMPLETED
Start: 2020-04-13 | End: 2020-04-13

## 2020-04-13 RX ORDER — HEPARIN SODIUM 1000 [USP'U]/ML
7000 INJECTION, SOLUTION INTRAVENOUS; SUBCUTANEOUS ONCE
Status: COMPLETED | OUTPATIENT
Start: 2020-04-13 | End: 2020-04-13

## 2020-04-13 RX ADMIN — INSULIN HUMAN 5 UNITS: 100 INJECTION, SUSPENSION SUBCUTANEOUS at 17:47

## 2020-04-13 RX ADMIN — HEPARIN SODIUM 7000 UNITS: 1000 INJECTION, SOLUTION INTRAVENOUS; SUBCUTANEOUS at 11:20

## 2020-04-13 RX ADMIN — OXYCODONE HYDROCHLORIDE 15 MG: 10 TABLET ORAL at 05:34

## 2020-04-13 RX ADMIN — FENTANYL CITRATE 25 MCG: 50 INJECTION, SOLUTION INTRAMUSCULAR; INTRAVENOUS at 11:00

## 2020-04-13 RX ADMIN — SODIUM CHLORIDE: 9 INJECTION, SOLUTION INTRAVENOUS at 05:21

## 2020-04-13 RX ADMIN — IODIXANOL 45 ML: 270 INJECTION, SOLUTION INTRAVASCULAR at 12:35

## 2020-04-13 RX ADMIN — OXYCODONE HYDROCHLORIDE 15 MG: 10 TABLET ORAL at 17:58

## 2020-04-13 RX ADMIN — PIPERACILLIN AND TAZOBACTAM 4.5 G: 4; .5 INJECTION, POWDER, LYOPHILIZED, FOR SOLUTION INTRAVENOUS; PARENTERAL at 20:06

## 2020-04-13 RX ADMIN — SENNOSIDES AND DOCUSATE SODIUM 2 TABLET: 8.6; 5 TABLET ORAL at 05:20

## 2020-04-13 RX ADMIN — OMEPRAZOLE 20 MG: 20 CAPSULE, DELAYED RELEASE ORAL at 05:20

## 2020-04-13 RX ADMIN — PROTAMINE SULFATE 30 MG: 10 INJECTION, SOLUTION INTRAVENOUS at 12:06

## 2020-04-13 RX ADMIN — OXYCODONE HYDROCHLORIDE 15 MG: 10 TABLET ORAL at 01:43

## 2020-04-13 RX ADMIN — FENTANYL CITRATE 25 MCG: 50 INJECTION, SOLUTION INTRAMUSCULAR; INTRAVENOUS at 11:20

## 2020-04-13 RX ADMIN — OXYCODONE HYDROCHLORIDE 15 MG: 10 TABLET ORAL at 12:57

## 2020-04-13 RX ADMIN — MAGNESIUM HYDROXIDE 30 ML: 400 SUSPENSION ORAL at 17:47

## 2020-04-13 RX ADMIN — LOVASTATIN 50 MG: 20 TABLET ORAL at 20:06

## 2020-04-13 RX ADMIN — ISOSORBIDE MONONITRATE 30 MG: 30 TABLET, EXTENDED RELEASE ORAL at 17:46

## 2020-04-13 RX ADMIN — ACETAMINOPHEN 650 MG: 325 TABLET, FILM COATED ORAL at 05:20

## 2020-04-13 RX ADMIN — SPIRONOLACTONE 50 MG: 50 TABLET ORAL at 17:46

## 2020-04-13 RX ADMIN — PIPERACILLIN AND TAZOBACTAM 4.5 G: 4; .5 INJECTION, POWDER, LYOPHILIZED, FOR SOLUTION INTRAVENOUS; PARENTERAL at 05:20

## 2020-04-13 RX ADMIN — CLOPIDOGREL BISULFATE 75 MG: 75 TABLET ORAL at 12:57

## 2020-04-13 RX ADMIN — ASPIRIN AND DIPYRIDAMOLE 1 CAPSULE: 25; 200 CAPSULE, EXTENDED RELEASE ORAL at 17:47

## 2020-04-13 RX ADMIN — HYDRALAZINE HYDROCHLORIDE 100 MG: 25 TABLET, FILM COATED ORAL at 17:46

## 2020-04-13 RX ADMIN — MIDAZOLAM HYDROCHLORIDE 1 MG: 1 INJECTION, SOLUTION INTRAMUSCULAR; INTRAVENOUS at 11:00

## 2020-04-13 RX ADMIN — MIDAZOLAM HYDROCHLORIDE 1 MG: 1 INJECTION, SOLUTION INTRAMUSCULAR; INTRAVENOUS at 11:20

## 2020-04-13 RX ADMIN — PIPERACILLIN AND TAZOBACTAM 4.5 G: 4; .5 INJECTION, POWDER, LYOPHILIZED, FOR SOLUTION INTRAVENOUS; PARENTERAL at 12:50

## 2020-04-13 RX ADMIN — AMITRIPTYLINE HYDROCHLORIDE 50 MG: 25 TABLET, FILM COATED ORAL at 20:06

## 2020-04-13 RX ADMIN — ACETAMINOPHEN 650 MG: 325 TABLET, FILM COATED ORAL at 20:06

## 2020-04-13 RX ADMIN — SENNOSIDES AND DOCUSATE SODIUM 2 TABLET: 8.6; 5 TABLET ORAL at 17:46

## 2020-04-13 ASSESSMENT — ENCOUNTER SYMPTOMS
NAUSEA: 0
NECK PAIN: 0
PHOTOPHOBIA: 0
BACK PAIN: 0
TREMORS: 0
PALPITATIONS: 0
DIAPHORESIS: 0
WEIGHT LOSS: 0
VOMITING: 0
TINGLING: 0
SHORTNESS OF BREATH: 0
HEMOPTYSIS: 0
CHILLS: 0
ABDOMINAL PAIN: 0
EYE PAIN: 0
DOUBLE VISION: 0
HEADACHES: 0
SPUTUM PRODUCTION: 0
ORTHOPNEA: 0

## 2020-04-13 ASSESSMENT — FIBROSIS 4 INDEX: FIB4 SCORE: 0.64

## 2020-04-13 NOTE — PROGRESS NOTES
Hospital Medicine Daily Progress Note    Date of Service  4/13/2020    Chief Complaint  64 y.o. male admitted 4/9/2020 with Leg wound       Hospital Course    64 y.o. male who presented 4/9/2020 with stroke, DM, HTN who presented from correctional facility with left diabetic foot ulcer that has been not healing despite antibiotics and wound Vac.  His documents show that his wound culture was growing Pseudomonas and enterococcus and he was treated with oral antibiotics.  He is unsure if he is still on antibiotics.  Dr. Lebron was consulted who recommended admission for debridement.  He complains of pain around the wound.  He denies fevers or chills.    Interval Problem Update  Wound culture with pseudomonas growth    Consultants/Specialty  Vascular surgery    Code Status  full    Disposition  pending    Review of Systems  Review of Systems   Constitutional: Negative for chills, diaphoresis and weight loss.   HENT: Negative for ear discharge, ear pain and nosebleeds.    Eyes: Negative for double vision, photophobia and pain.   Respiratory: Negative for hemoptysis, sputum production and shortness of breath.    Cardiovascular: Negative for chest pain, palpitations and orthopnea.   Gastrointestinal: Negative for abdominal pain, nausea and vomiting.   Genitourinary: Negative for frequency, hematuria and urgency.   Musculoskeletal: Negative for back pain and neck pain.   Skin: Negative for itching.   Neurological: Negative for tingling, tremors and headaches.        Physical Exam  Temp:  [35.9 °C (96.6 °F)-37.1 °C (98.7 °F)] 35.9 °C (96.6 °F)  Pulse:  [60-65] 60  Resp:  [16-18] 18  BP: (102-131)/(59-65) 131/62  SpO2:  [93 %-98 %] 98 %    Physical Exam  Constitutional:       General: He is not in acute distress.     Appearance: He is not toxic-appearing.   HENT:      Head: Normocephalic and atraumatic.      Nose: No congestion or rhinorrhea.      Mouth/Throat:      Mouth: Mucous membranes are dry.   Eyes:       Conjunctiva/sclera: Conjunctivae normal.   Neck:      Musculoskeletal: No neck rigidity or muscular tenderness.   Cardiovascular:      Rate and Rhythm: Normal rate and regular rhythm.      Heart sounds: No murmur. No friction rub.   Pulmonary:      Effort: No respiratory distress.      Breath sounds: No stridor.   Abdominal:      General: Abdomen is flat.      Palpations: Abdomen is soft.   Musculoskeletal: Normal range of motion.      Comments: Eschar and ulcers are noted on the  Lateral side of the right foot   Skin:     Coloration: Skin is not jaundiced or pale.   Neurological:      General: No focal deficit present.      Mental Status: He is alert. Mental status is at baseline.         Fluids    Intake/Output Summary (Last 24 hours) at 4/13/2020 0906  Last data filed at 4/13/2020 0755  Gross per 24 hour   Intake 4452 ml   Output 440 ml   Net 4012 ml       Laboratory  Recent Labs     04/11/20  0332 04/12/20  0356   WBC 10.3 9.4   RBC 3.96* 3.72*   HEMOGLOBIN 10.2* 9.6*   HEMATOCRIT 30.9* 29.3*   MCV 78.0* 78.8*   MCH 25.8* 25.8*   MCHC 33.0* 32.8*   RDW 41.2 41.5   PLATELETCT 298 284   MPV 9.3 9.4     Recent Labs     04/11/20  0332 04/12/20  0356   SODIUM 126* 128*   POTASSIUM 4.5 4.1   CHLORIDE 91* 94*   CO2 23 21   GLUCOSE 151* 217*   BUN 14 12   CREATININE 0.82 0.67   CALCIUM 9.4 9.0                   Imaging       Assessment/Plan  * Diabetic foot ulcer (HCC)- (present on admission)  Assessment & Plan  Not healing despite oral antibiotics and wound VAC  Wound culture with pseudomonas   on Zosyn for now  Vascular surgery input is noted  ?of amputation    PAD (peripheral artery disease) (HCC)- (present on admission)  Assessment & Plan  Angiogram today  Vascular surgery input is noted  ?of amputation    Dysphagia as late effect of stroke- (present on admission)  Assessment & Plan  History of    History of stroke- (present on admission)  Assessment & Plan  With residual left-sided weakness and  dysphasia  aggrenox  lovastatin    HTN (hypertension)- (present on admission)  Assessment & Plan  Will monitor  continue on Norvasc, hydralazine, Imdur, atenolol, losartan    DM (diabetes mellitus) (HCC)- (present on admission)  Assessment & Plan  Continue on NPH and ISS  hema A1c is 7.1  accu checks are noted  Diabetic diet       VTE prophylaxis: scd

## 2020-04-13 NOTE — OP REPORT
OPERATIVE REPORT      Patient:  [facility]     Procedure Date:  04/13/20    Indication:  The patient presents for right lower extremity angiogram for critical limb ischemia with extensive chronic wounds to the right foot. Diagnostic angiography is indicated as there is no pre-operative CT scan to guide therapy.    Pre-Operative Diagnosis:  PAD   Aortoiliac occlusive disease  Hx of CVA  Wheelchair bound    Post-Operative Diagnosis:  Same    Procedure(s):   Diagnostics  US guided access of the right common femoral artery  Introduction of catheter to aorta  Abdominal aortogram  Bilateral iliac angiogram  Selective catheterization of the right common femoral artery (2nd order cath)  Right lower extremity angiogram  Selective catheterization of the right popliteal artery (initial 3rd order cath)     Intervention(s)  SFA angioplasty    Moderate Sedation, 75 minutes    Surgeon:  Jonh Dennis M.D.    Assistant:  None    Anesthesia:  Moderate Sedation    EBL:  10cc    Radiation:  83 mGy    Contrast:  45cc    Specimen:  None    Complications:  None    Findings:  The aorta and bilateral iliacs are heavily calcified. There is focal 50% L RAMIRO stenosis. Right lower extremity angiogram confirmed diffuse calcification throughout the entire leg. There is mild CFA and profunda disease. The profunda is diminutive. The SFA and popliteal were patent but with moderate diffuse disease and multiple areas of high-grade stenosis. The below-knee popliteal and AT are widely patent. The TP trunk, PT, and peroneal are occluded with no distal reconstitution. The AT provides inline flow to the foot. The pedal arch is not completely intact and heavily pruned. There is minimal flow towards the heel.    Angioplasty of the AK popliteal and entire SFA was performed using a 5mm balloon to ensure adequate flow to heal an amputation.    Procedure:  Informed consent was obtained from the patient in the pre-operative holding area.  All risks, benefits, and alternatives were explained and he elected to proceed. The patient was brought to the interventional suite and placed on the table in a supine position. A time-out was performed verifying the correct site of surgery. The patient was prepped and draped in the usual sterile fashion.    A trained nurse acting under my direct supervision administered conscious sedation using fentanyl and versed. Pulse oximetry and continuous EKG tracings were monitored throughout the case. Total sedation time was 75 minutes.    I began by accessing the left common femoral artery under ultrasound visualization. The vessel was patent and the needle was visualized entering the artery. Images were saved in the medical record.    A 5 [place] sheath was inserted retrograde in the femoral artery. The patient was given 70U/kg heparin.    I then advanced a flush catheter over a wire into the abdominal aorta and performed an aortogram with findings noted above. I then crossed the bifurcation and selected the right common femoral artery and performed right lower extremity angiography. Findings are noted above.     Once diagnostic imaging was obtained, I exchanged for a stiff wire and up-sized to a 6 [place] sheath which was advanced into the right SFA.     Angioplasty of the above-knee popliteal and entire SFA was performed using a 5mm balloon. Completion imaging showed significant improvement through the leg. There were also multiple small arteriovenous fistulas off of SFA branch vessels filling the SFV.    This concluded the procedure. The sheath in the access site was removed and the artery was closed with a 6 [place] angioseal. The patient was recovered from sedation and taken to the recovery room in stable condition.      Jonh Dennis M.D.  Vascular Surgeon  [facility]

## 2020-04-13 NOTE — CONSULTS
LIMB PRESERVATION SERVICE CONSULT      REFERRED BY: Murali Maciel M.D.    DATE OF CONSULTATION: 4/13/2020    REASON FOR CONSULT: Diabetic foot ulcers     HISTORY OF PRESENT ILLNESS: Five Rvparamount is a 64 y.o.  with a past medical history that includes type 2 diabetes, CVA, obesity, left above-the-knee amputation (2017) admitted 4/9/2020 for DM ulcers RLE leg and foot. LPS has been consulted for evaluation of diabetic ulcers to right foot and lower extremity.  The patient reports that he has had ulcers to his right foot and right lower leg for 3 weeks (3/23/2020) for which he has been managing with wound VAC and antibiotics.  His wounds have not improved despite these interventions.  Documentation show that his wound culture was growing Pseudomonas and enterococcus and he was treated with unknown antibiotics.  Arterial studies at the correctional facility reported to have been done but were not transferred.  Vascular surgery was consulted, completed repeat arterial studies which showed poor arterial blood flow to the right lower extremity.  Vascular surgery recommended angiogram before surgical intervention and the patient is scheduled to go for angiogram 4/13/2020.  The patient reports pain around his ulcers, surrounding erythema, drainage for an unknown duration of time.  The patient denies fever, chills, nausea, vomiting, chest pain, shortness of breath, headache.    IV antibiotics were started on this admission.  Infectious diseases has not been consulted.    Xray has not been completed.  Ortho has not been consulted yet.    Diagnosed with diabetes in 1996 and is currently managing with insulin and oral agent.  Checks blood sugars daily his blood sugars are unknown.  Patient denies having previous diabetes education.  Does have numbness to his right foot.  Reports that he checks his right foot.  He does not have diabetic shoes and inserts and usually wears nonprescriptive shoe on his right foot.  He is  wheelchair-bound and does not use a prosthetic to his left lower extremity. Has had previous foot surgeries including left above-the-knee amputation with Dr. Barreto in 2017.   The patient is currently incarcerated and does not work.    Smoking:   reports that he has never smoked. He does not have any smokeless tobacco history on file.    Alcohol:   reports no history of alcohol use.    Drug:   reports no history of drug use.      PAST MEDICAL HISTORY:   Past Medical History:   Diagnosis Date   • CVA (cerebral infarction) 2011   • Diabetes (CMS-HCC)    • Dysphagia    • HTN (hypertension)    • Obesity         PAST SURGICAL HISTORY:   Past Surgical History:   Procedure Laterality Date   • KNEE AMPUTATION ABOVE Left 9/7/2017    Procedure: KNEE AMPUTATION ABOVE;  Surgeon: Ken Osei M.D.;  Location: SURGERY [facility];  Service: General   • VITRECTOMY POSTERIOR Left 1/5/2016    Procedure: VITRECTOMY POSTERIOR membrane dissection laser ;  Surgeon: Parmjit Newell M.D.;  Location: SURGERY SAME DAY [facility];  Service:        MEDICATIONS:   Current Facility-Administered Medications   Medication Dose   • insulin NPH (HUMULIN/NOVOLIN) injection 14 Units  14 Units   • oxyCODONE immediate-release (ROXICODONE) tablet 15 mg  15 mg   • senna-docusate (PERICOLACE or SENOKOT S) 8.6-50 MG per tablet 2 Tab  2 Tab    And   • polyethylene glycol/lytes (MIRALAX) PACKET 1 Packet  1 Packet    And   • magnesium hydroxide (MILK OF MAGNESIA) suspension 30 mL  30 mL    And   • bisacodyl (DULCOLAX) suppository 10 mg  10 mg   • NS infusion     • acetaminophen (TYLENOL) tablet 650 mg  650 mg   • ondansetron (ZOFRAN) syringe/vial injection 4 mg  4 mg   • ondansetron (ZOFRAN ODT) dispertab 4 mg  4 mg   • promethazine (PHENERGAN) tablet 12.5-25 mg  12.5-25 mg   • promethazine (PHENERGAN) suppository 12.5-25 mg  12.5-25 mg   • prochlorperazine (COMPAZINE) injection 5-10 mg  5-10 mg   • amitriptyline (ELAVIL) tablet 50 mg  50 mg   •  amLODIPine (NORVASC) tablet 10 mg  10 mg   • atenolol (TENORMIN) tablet 100 mg  100 mg   • aspirin-dipyridamole (AGGRENOX)  MG 1 Cap  1 Cap   • hydrALAZINE (APRESOLINE) tablet 100 mg  100 mg   • insulin NPH (HUMULIN/NOVOLIN) injection 5 Units  5 Units   • isosorbide mononitrate SR (IMDUR) tablet 30 mg  30 mg   • losartan (COZAAR) tablet 50 mg  50 mg   • lovastatin (MEVACOR) tablet 50 mg  50 mg   • spironolactone (ALDACTONE) tablet 50 mg  50 mg   • omeprazole (PRILOSEC) capsule 20 mg  20 mg   • morphine (pf) 4 MG/ML injection 1-2 mg  1-2 mg   • guaiFENesin (ROBITUSSIN) 100 MG/5ML solution 200 mg  10 mL   • piperacillin-tazobactam (ZOSYN) 4.5 g in  mL IVPB  4.5 g       ALLERGIES:  No Known Allergies     FAMILY HISTORY: No family history on file.      REVIEW OF SYSTEMS:   Constitutional: Negative for chills, fever   Respiratory: Negative for cough and shortness of breath.    Cardiovascular:Negative for chest pain  Gastrointestinal: Negative for constipation, diarrhea, nausea and vomiting.   Lower extremities: positive for left AKA, right lower leg wounds, pain and redness to right lower leg, negative for swelling.  Neurological: positive for dysphasia, numbness to right foot and lower leg   All other systems reviewed and are negative     RESULTS:     Recent Labs     04/11/20  0332 04/12/20  0356   WBC 10.3 9.4   RBC 3.96* 3.72*   HEMOGLOBIN 10.2* 9.6*   HEMATOCRIT 30.9* 29.3*   MCV 78.0* 78.8*   MCH 25.8* 25.8*   MCHC 33.0* 32.8*   RDW 41.2 41.5   PLATELETCT 298 284   MPV 9.3 9.4     Recent Labs     04/11/20  0332 04/12/20  0356   SODIUM 126* 128*   POTASSIUM 4.5 4.1   CHLORIDE 91* 94*   CO2 23 21   GLUCOSE 151* 217*   BUN 14 12          ESR:     None this admission    CRP:       None this admission    X-ray: None this admission    MRI: None this admission    Arterial studies:     US-CORDELIA single level unilateral right 4/11/2020           RIGHT      Waveform            Systolic BPs (mmHg)                               143           Brachial   Bi, non-                                 Common Femoral   reversed   Absent                                   Posterior Tibial   Monophasic                               Dorsalis Pedis                                            Peroneal                                            CORDELIA                                            TBI                           LEFT   Waveform        Systolic BPs (mmHg)                              137           Brachial                                            Common Femoral                                            Posterior Tibial                                            Dorsalis Pedis                                            Peroneal                                            CORDELIA                                            TBI     Conclusions   CORDELIA and TBI cannot acquired due ro patient condition.      Abnormal waveformsin the right lower extremity, consistent with moderate    arterial insufficiency.      US-extremity artery lower unilateral right 4/11/2020                RIGHT   Waveform        Peak Systolic Velocity (cm/s)                   Prox    Prox-Mid  Mid    Mid-Dist  Distal   Bi, non-                          157                      CFA   reversed         Triphasic       143                                        PFA         Monophasic      142      412      83      250      210     SFA         Monophasic                        108              80      POP         Monophasic      9                         0        31      AT         Absent          0                                  0       PT         Absent          0                                  0       ALTAF          CONCLUSIONS   Multiple moderate to severe focal stenosis throughout the right femoral    artery, most a long the proximal aspect(>75%)      No flow seen in the posterior tibial or peroneal arteries.        Occluded anterior tibial artery with flow reconstitution at  "the ankle.        A1c:  Lab Results   Component Value Date/Time    HBA1C 7.1 (H) 04/10/2020 04:25 AM            Microbiology:  Results     Procedure Component Value Units Date/Time    CULTURE WOUND W/ GRAM STAIN [[id number]]  (Abnormal)  (Susceptibility) Collected:  04/09/20 2340    Order Status:  Completed Specimen:  Wound from Left Foot Updated:  04/12/20 0803     Significant Indicator POS     Source WND     Site LEFT FOOT     Culture Result Moderate growth mixed skin adrianna.     Gram Stain Result No organisms seen.     Culture Result Pseudomonas aeruginosa  Light growth  P.aeruginosa may develop resistance during prolonged therapy  with all antibiotics. Isolates that are initially susceptible  may become resistant within three to four days after  initiation of therapy. Testing of repeat isolates may be  warranted.      Narrative:       Collected By:[id number] NASIR MORLEY  Collected By:[id number] NASIR MORLEY    Susceptibility     Pseudomonas aeruginosa (1)     Antibiotic Interpretation Microscan Method Status    Ceftazidime Sensitive 4 mcg/mL NICOLAS Final    Ciprofloxacin Sensitive <=1 mcg/mL NICOLAS Final    Cefepime Sensitive <=2 mcg/mL NICOLAS Final    Amikacin Sensitive <=16 mcg/mL NICOLAS Final    Gentamicin Sensitive <=4 mcg/mL NICOLAS Final    Tobramycin Sensitive <=4 mcg/mL NICOLAS Final    Meropenem Sensitive <=1 mcg/mL NICOLAS Final    Pip/Tazobactam Sensitive <=16 mcg/mL NICOLAS Final                   GRAM STAIN [[id number]] Collected:  04/09/20 2340    Order Status:  Completed Specimen:  Wound Updated:  04/10/20 1634     Significant Indicator .     Source WND     Site LEFT FOOT     Gram Stain Result No organisms seen.    Narrative:       Collected By:[id number] NASIR MORLEY  Collected By:[id number] NASIR MORLEY           PHYSICAL EXAMINATION:     VITAL SIGNS: /62   Pulse 60   Temp 35.9 °C (96.6 °F) (Temporal)   Resp 18   Ht 1.708 m (5' 7.24\")   Wt 90.1 kg (198 lb 10.2 oz)   SpO2 98%   BMI 30.89 kg/m²       General Appearance:  Well " developed, well nourished male, in no acute distress.    Lower Extremity Assessment:    Edema:   None    Pulses:  R foot: Unable to palpate or Doppler right DP/PT pulses  Left AKA present    ROM dorsi/plantarflexion  Mild resistance ROM to right ankle.    Structural /mechanical changes:  None to right foot    Sensory Assessment  Patient has left AKA  Monofilament testing with a 10 gram force: sensation intact: decreased on right  Visual Inspection: right foot has heel ulcer. No maceration and fissures.  Pedal pulses: decreased on right   Monofilament: Right 2/10, Left aka       Wound Assessment:  Right lateral heel ulcer with 100% dry eschar bed.  There is surrounding erythema but no fluctuance, active drainage, or odor.  Right lateral ankle ulcer with dry scabbing in place over wound bed with surrounding erythema. There is mild fluctuance and small amount of purulent drainage with palpation.  T  Right distal lateral lower leg ulcer with 40% pale, pink, moist tissue, slough and small amount of purulent drainage with palpation.  Mild surrounding erythema, mild tenderness with palpation.  Right distal posterolateral leg ulcer with pale pink wound bed, small amount of slough and serous drainage.  There is mild surrounding erythema and tenderness with palpation.   Right distal anterior leg ulcer with dried scabbing.  No surrounding erythema, swelling, drainage.                  ASSESSMENT AND PLAN:   The patient has dried eschar on her right lateral foot in addition to multiple diabetic ulcers to his right lower leg.  His right pedal pulses are nonpalpable and unable to auscultate pulses with Doppler.  Arterial studies completed demonstrate arterial insufficiency.  The patient is to go for angiogram with vascular surgery 4/13/2020 prior to tentative plans for debridement versus amputation.  At this time, patient is to have local wound care to his ulcers as well as offloading.    Wound care:   -Wound care orders placed  for nursing  -apply betadine to right lower extremity ulcers and right heel eschar twice a day and keep open to air    Imaging/Labs:  -none at this time.     Vascular status:   -patient to go for angiography     Surgery:   -None plan at this time    Antibiotics:   -Zosyn IV, managed by hospitalist team    Weight Bearing Status:   -Touch toe weight bearing, pt wheelchair bound    Offloading:   -heel mepilex to RLE in place, patient refuses prevalon boot, floating heel off bed.     PT/OT:   -consult not at this time    Diabetes Education:   -consult CDE and RD placed.  - Implications of loss of protective sensation (LOPS) discussed with patient- including increased risk for amputation.  Advised to check feet at least daily, moisturize feet, and to always wear protective foot wear.   -avoid trimming own nails. See podiatrist or certified foot and nail RN  -keep blood sugars <150 for improved wound healing    D/W: pt, Korin bedside RN, Margie wound care RN, Dr. Maciel.    Discharge Plan:   Patient remain inpatient for ongoing medical care.  Patient is pending RLE angiogram today.  LPS to follow.     Please note that this dictation was created using voice recognition software. I have  worked with technical experts from [employer] to optimize the interface.  I have made every reasonable attempt to correct obvious errors, but there may be errors of grammar and possibly content that I did not discover before finalizing the note.

## 2020-04-13 NOTE — PROGRESS NOTES
2 RN skin check complete.   Devices in place: mepilex, PIV.  Skin assessed under devices and all bony prominences.  Confirmed wounds found on anterior R heel/ankle/calf x 5. All are JESSICA with eschar and no drainage except wound to rt calf. Rt upper calf wound is open and draining serous fluid. Mepilex was placed over open wound.   Redness and injury noted to lt thigh/groin. JESSICA. No drainage noted. Site was cleansed and barrier paste was applied.   Skin breakdown, fragility and flakiness noted to sacrum. Mepilex was removed and replaced.   No new potential pressure ulcers/wounds noted.  Wound consult placed for existing wounds.  The following interventions were started: waffle mattress, preventative mepilex to R heel and elbows, Q 2 Turns encouraged, pillows in use to support head and LUE.   Pt declines turns and heel float boot at this time despite education.

## 2020-04-13 NOTE — CARE PLAN
Problem: Knowledge Deficit  Goal: Knowledge of disease process/condition, treatment plan, diagnostic tests, and medications will improve  Outcome: PROGRESSING AS EXPECTED  Goal: Knowledge of the prescribed therapeutic regimen will improve  Outcome: PROGRESSING AS EXPECTED  Intervention: Discuss information regarding therpeutic regimen and document in education  Note: Pt was educated on POC, MAR, shift routine and nursing education R/T Dx. Questions were encouraged and answered. Guards and pt denied questions at this time. Pt verbalized understanding of all teaching.           Problem: Mobility  Goal: Risk for activity intolerance will decrease  Outcome: NOT MET  Intervention: Assess and monitor signs of activity intolerance  Note: Pt states that he is unable to mobilize OOB at this time D/T pain and weakness.

## 2020-04-13 NOTE — CONSULTS
Diabetes education: Met with pt who has a hx of diabetes on insulin and oral agents. Pt was admitted with blood sugar of 109 and Hg a1c of 7.1%.  Pt is currently on NPH 14 units in am (new and held this am) and 5 units pm. Pt on no fast acting coverage but has finger sticks once a day with blood sugars of 149, 196, ,138, and 166.  Pt goes to the [facility] to get his finger sticks, oral agents and insulin given. Pt states he eats 3 meals a day with more carbs than protein and does not always have HS snack . With NPH given at diner pt can have a low blood sugar at bedtime.  Plan: Pt unable to do his own skills as they are done by nursing at the facility. Encouraged him to have at least a protein at bedtime but no always his choice. No further education needs at this time. Please call [phone] or reorder DM education if needs change.

## 2020-04-13 NOTE — WOUND TEAM
In to see patient for wound consult of buttocks.  Patient having ultrasound guiding IV placed and needs to be supine for 2 more hours after angio.  LPS saw patient for leg wounds this morning.  Will follow up tomorrow.

## 2020-04-13 NOTE — CARE PLAN
Problem: Communication  Goal: The ability to communicate needs accurately and effectively will improve  Outcome: PROGRESSING AS EXPECTED     Problem: Safety  Goal: Will remain free from injury  Outcome: PROGRESSING AS EXPECTED     Problem: Bowel/Gastric:  Goal: Normal bowel function is maintained or improved  Outcome: PROGRESSING AS EXPECTED     Problem: Discharge Barriers/Planning  Goal: Patient's continuum of care needs will be met  Outcome: PROGRESSING AS EXPECTED     Problem: Pain Management  Goal: Pain level will decrease to patient's comfort goal  Outcome: PROGRESSING AS EXPECTED     Problem: Respiratory:  Goal: Respiratory status will improve  Outcome: PROGRESSING AS EXPECTED

## 2020-04-13 NOTE — DISCHARGE PLANNING
"SW completed chart review for assessment.     Patient is a 64 year old single white male who is currently in the [facility] in [place], [place]. Patient is pending medicaid, receives any medical tx from group home.     Per notes, patient has L leg amputation, uses WC. No home O2. Hx of stroke, diabetes, and now has foot ulcer.     Per physician notes on 4/12, patient is recommended debridement surgery, oral antibiotics. Vascular surgeon, Dr. Lebron reports \"aniogram is indicated before amputation\" which he was attempting to arrange for 4/13.     PLAN: Care coordination to follow once surgery has happened to communicate any medical needs to group home.         "

## 2020-04-13 NOTE — PROGRESS NOTES
Progress Note    CC: f/u for right lower extremity critical limb ischemia     Interval History: 64 y.o. male who presented 4/9/2020 with hx of L AKA and chronic R foot wounds. He is an inmate and has had no vascular f/u.    No new complaints today.    Review of Systems  Negative for chest pain or SOB  Negative for acute stroke/TIA    Medications  Prior to Admission Medications   Prescriptions Last Dose Informant Patient Reported? Taking?   Dextromethorphan-Quinidine (NUEDEXTA) 20-10 MG Cap 4/9/2020 at Unknown time MAR from Other Facility Yes No   Sig: Take 1 Tab by mouth 2 Times a Day.   amitriptyline (ELAVIL) 50 MG Tab 4/8/2020 at Unknown time  Yes Yes   Sig: Take 50 mg by mouth every evening.   amlodipine (NORVASC) 5 MG Tab 4/9/2020 at Unknown time MAR from Other Facility Yes No   Sig: Take 10 mg by mouth every day.   atenolol (TENORMIN) 100 MG Tab 4/9/2020 at Unknown time MAR from Other Facility Yes No   Sig: Take 100 mg by mouth every day.   dipyridamole-aspirin (AGGRENOX)  MG CAPSULE SR 12 HR 4/9/2020 at Unknown time MAR from Other Facility Yes No   Sig: Take 1 Cap by mouth 2 times a day.   glipiZIDE (GLUCOTROL) 10 MG Tab 4/9/2020 at Unknown time  Yes Yes   Sig: Take 10 mg by mouth 2 times a day.   hydrALAZINE (APRESOLINE) 50 MG Tab 4/9/2020 at Unknown time MAR from Other Facility Yes No   Sig: Take 100 mg by mouth 2 Times a Day.   insulin NPH (HUMULIN/NOVOLIN) 100 UNIT/ML Suspension 4/9/2020 at Unknown time  Yes Yes   Sig: Inject 14 Units as instructed every morning.   insulin NPH (HUMULIN/NOVOLIN) 100 UNIT/ML Suspension 4/8/2020 at Unknown time  Yes Yes   Sig: Inject 5 Units as instructed every evening.   insulin regular (HUMULIN R) 100 Unit/mL Solution 4/8/2020 at Unknown time  Yes Yes   Sig: Inject 2-10 Units as instructed 2 times a day as needed for High Blood Sugar.   isosorbide mononitrate SR (IMDUR) 30 MG TABLET SR 24 HR 4/9/2020 at Unknown time MAR from Other Facility Yes No   Sig: Take 30 mg  by mouth 2 Times a Day.   losartan (COZAAR) 50 MG Tab 4/9/2020 at Unknown time MAR from Other Facility Yes No   Sig: Take 50 mg by mouth every day.   lovastatin (MEVACOR) 40 MG tablet 4/9/2020 at Unknown time MAR from Other Facility Yes No   Sig: Take 50 mg by mouth every evening.   omeprazole (PRILOSEC OTC) 20 MG tablet 4/9/2020 at Unknown time MAR from Other Facility Yes No   Sig: Take 20 mg by mouth every day.   oxycodone (OXY-IR) 15 MG immediate release tablet 4/9/2020 at Unknown time MAR from Other Facility Yes No   Sig: Take 15 mg by mouth every four hours as needed for Severe Pain.   spironolactone (ALDACTONE) 50 MG Tab 4/9/2020 at Unknown time MAR from Other Facility Yes No   Sig: Take 50 mg by mouth 2 times a day.      Facility-Administered Medications: None         Physical Exam  Vitals:    04/13/20 0755   BP: 131/62   Pulse: 60   Resp: 18   Temp: 35.9 °C (96.6 °F)   SpO2: 98%       Pulse/Extremity Exam:    Femorals:        Right: palpable       Left palpable    Pedal Pulses:       Right DP monophasic       Right PT monophasic    L AKA    Wounds: Extensive R lateral heel ulcer w/ dry eschar      General appearance: NAD  Psych: Flat affect, delayed cognition  Neuro: CN II-XII grossly intact.   Neck: full range of motion  Lungs: No inspiratory stridor or wheezing  CV: RRR  Abdomen: Soft, NT/ND  Skin: No rashes    Labs reviewed today:  Recent Labs     04/11/20 0332 04/12/20 0356   WBC 10.3 9.4   RBC 3.96* 3.72*   HEMOGLOBIN 10.2* 9.6*   HEMATOCRIT 30.9* 29.3*   MCV 78.0* 78.8*   MCH 25.8* 25.8*   MCHC 33.0* 32.8*   RDW 41.2 41.5   PLATELETCT 298 284   MPV 9.3 9.4     Recent Labs     04/11/20 0332 04/12/20  0356   SODIUM 126* 128*   POTASSIUM 4.5 4.1   CHLORIDE 91* 94*   CO2 23 21   GLUCOSE 151* 217*   BUN 14 12   CREATININE 0.82 0.67   CALCIUM 9.4 9.0     Recent Labs     04/11/20 0332 04/12/20  0356   ALTSGPT 9 8   ASTSGOT 7* 8*   ALKPHOSPHAT 77 73   TBILIRUBIN 0.5 0.3   GLUCOSE 151* 217*                    No results for input(s): TROPONINI in the last 72 hours.    Urinalysis:    No results found     Imaging & Data Review:  I personally reviewed all non-invasive vascular testing including images, x-rays, tracings, arterial waveforms, and duplex exams relevant to this admission. My interpretation is below:    ABIs: Unobtainable d/t pain    Arterial Duplex: Diffuse SFA and tibial disease. PT/peroneal occluded. AT may be occluded    Assessment/Plan & Medical Decision-[facility] inmate admitted with hx of L AKA and chronic R heel wounds. NIV confirms severe diffuse disease.    Plan for RLE angiogram. I explained that extensive heel wounds are difficult to treat/heal even if successful revasc is achieved. If he is unable to heal the wound, would need an AKA.    Thank you for involving us in this patient's care. Please call with questions.    Jonh Dennis MD  Vascular Surgeon  [facility]  Office: [phone]

## 2020-04-13 NOTE — PROGRESS NOTES
IR Nursing Note:    Patient underwent a Right Lower Extremity Angiogram with Possible Intervention by Dr. Dennis. Consent obtained  by MD prior to procedure starting and procedure was verified by patient and procedure team. Pedal pulses prior to case Right auscultated with doppler and Left BKA. Patient was placed in a supine position in IR 1 with all bony prominences padded and draped in sterile fashion. Vitals were taken every 5 minutes and remained stable during procedure (see doc flow sheet for results). CO2 waveform capnography was monitored and remained 31-40 throughout procedure. Left groin access sealed with a TERUMO Angio-Seal Vascular Closure Device. A gauze and tegaderm  dressing was placed over left groin surgical site. Report called to Korin GOODEN. Pt transported via gurney with RN to T419 in a stable condition.    TERUMO Angio-Seal Vascular Closure Device 6F  REF# [id number] LOT# [id number]  EXP. 08/31/2020

## 2020-04-13 NOTE — PROGRESS NOTES
2 RN skin check complete.   Wounds to  R heel/ankle/calf x 5. JESSICA, dry with eschar, painted with betadine. R upper calf wound with mepilex.   Redness to L thigh/groin. JESSICA.   Redness/flakiness/scabs to sacrum. Mepilex in place.   Waffle mattress in place, preventative mepilex in place to bony prominences.  Refusing Q2 turns and heel float boot to R foot.    L groin angio site with gauze/tegaderm, CDI.

## 2020-04-13 NOTE — PROGRESS NOTES
Report received from Marty Prasad  Assumed care of patient at 1915.    Pt is A&O x 4. Weakness noted to all extremities especially LUE. Pt describes numbness to LUE. Labile moods noted; pt cries and laughs at once. Pt has baseline dysphagia.   Pain reported at 7/10. Pt was medicated per MAR.   Nausea denied   Tolerating regular DM Diet. Pt denies difficulty swallowing foods. Medication is taken whole in apple sauce.   Wounds noted to anterior rt heel x 5. All are covered with eschar and JESSICA. No drainage noted.   Injury noted to lt thigh. JESSICA. No drainage.  Skin breakdown noted to sacrum. Mepilex is in place.   + Urine output  - BM since PTA   + Flatus  LISSETTE mobility D/T weakness. Pt states that he uses a wheel chair baseline.   SCD's refused D/T pain.   Guards x 2 at bedside.  Bed in lowest position and locked.  Bed alarm in place and on.   Pt resting comfortably now.  Review plan of care with patient  Call light within reach  Hourly rounds in place  All needs met at this time

## 2020-04-14 PROBLEM — D50.9 IRON DEFICIENCY ANEMIA: Status: ACTIVE | Noted: 2020-04-14

## 2020-04-14 LAB
ALBUMIN SERPL BCP-MCNC: 3.2 G/DL (ref 3.2–4.9)
ALBUMIN/GLOB SERPL: 0.7 G/DL
ALP SERPL-CCNC: 72 U/L (ref 30–99)
ALT SERPL-CCNC: 9 U/L (ref 2–50)
ANION GAP SERPL CALC-SCNC: 13 MMOL/L (ref 7–16)
AST SERPL-CCNC: 7 U/L (ref 12–45)
BASOPHILS # BLD AUTO: 0.5 % (ref 0–1.8)
BASOPHILS # BLD: 0.05 K/UL (ref 0–0.12)
BILIRUB SERPL-MCNC: 0.4 MG/DL (ref 0.1–1.5)
BUN SERPL-MCNC: 10 MG/DL (ref 8–22)
CALCIUM SERPL-MCNC: 9.1 MG/DL (ref 8.5–10.5)
CHLORIDE SERPL-SCNC: 93 MMOL/L (ref 96–112)
CO2 SERPL-SCNC: 20 MMOL/L (ref 20–33)
CREAT SERPL-MCNC: 0.84 MG/DL (ref 0.5–1.4)
EOSINOPHIL # BLD AUTO: 0.2 K/UL (ref 0–0.51)
EOSINOPHIL NFR BLD: 1.9 % (ref 0–6.9)
ERYTHROCYTE [DISTWIDTH] IN BLOOD BY AUTOMATED COUNT: 42.1 FL (ref 35.9–50)
GLOBULIN SER CALC-MCNC: 4.3 G/DL (ref 1.9–3.5)
GLUCOSE BLD-MCNC: 139 MG/DL (ref 65–99)
GLUCOSE BLD-MCNC: 170 MG/DL (ref 65–99)
GLUCOSE SERPL-MCNC: 182 MG/DL (ref 65–99)
HCT VFR BLD AUTO: 34.1 % (ref 42–52)
HGB BLD-MCNC: 11 G/DL (ref 14–18)
IMM GRANULOCYTES # BLD AUTO: 0.07 K/UL (ref 0–0.11)
IMM GRANULOCYTES NFR BLD AUTO: 0.7 % (ref 0–0.9)
LYMPHOCYTES # BLD AUTO: 1.99 K/UL (ref 1–4.8)
LYMPHOCYTES NFR BLD: 19.3 % (ref 22–41)
MCH RBC QN AUTO: 25.8 PG (ref 27–33)
MCHC RBC AUTO-ENTMCNC: 32.3 G/DL (ref 33.7–35.3)
MCV RBC AUTO: 79.9 FL (ref 81.4–97.8)
MONOCYTES # BLD AUTO: 0.82 K/UL (ref 0–0.85)
MONOCYTES NFR BLD AUTO: 8 % (ref 0–13.4)
NEUTROPHILS # BLD AUTO: 7.16 K/UL (ref 1.82–7.42)
NEUTROPHILS NFR BLD: 69.6 % (ref 44–72)
NRBC # BLD AUTO: 0 K/UL
NRBC BLD-RTO: 0 /100 WBC
OSMOLALITY SERPL: 278 MOSM/KG H2O (ref 278–298)
OSMOLALITY UR: 575 MOSM/KG H2O (ref 300–900)
PLATELET # BLD AUTO: 306 K/UL (ref 164–446)
PMV BLD AUTO: 9.6 FL (ref 9–12.9)
POTASSIUM SERPL-SCNC: 4.5 MMOL/L (ref 3.6–5.5)
PROT SERPL-MCNC: 7.5 G/DL (ref 6–8.2)
RBC # BLD AUTO: 4.27 M/UL (ref 4.7–6.1)
SODIUM SERPL-SCNC: 126 MMOL/L (ref 135–145)
SODIUM UR-SCNC: 187 MMOL/L
WBC # BLD AUTO: 10.3 K/UL (ref 4.8–10.8)

## 2020-04-14 PROCEDURE — 80053 COMPREHEN METABOLIC PANEL: CPT

## 2020-04-14 PROCEDURE — 83930 ASSAY OF BLOOD OSMOLALITY: CPT

## 2020-04-14 PROCEDURE — 36415 COLL VENOUS BLD VENIPUNCTURE: CPT

## 2020-04-14 PROCEDURE — 82962 GLUCOSE BLOOD TEST: CPT

## 2020-04-14 PROCEDURE — 84300 ASSAY OF URINE SODIUM: CPT

## 2020-04-14 PROCEDURE — 700102 HCHG RX REV CODE 250 W/ 637 OVERRIDE(OP): Performed by: FAMILY MEDICINE

## 2020-04-14 PROCEDURE — A9270 NON-COVERED ITEM OR SERVICE: HCPCS | Performed by: INTERNAL MEDICINE

## 2020-04-14 PROCEDURE — 700102 HCHG RX REV CODE 250 W/ 637 OVERRIDE(OP): Performed by: INTERNAL MEDICINE

## 2020-04-14 PROCEDURE — A9270 NON-COVERED ITEM OR SERVICE: HCPCS | Performed by: FAMILY MEDICINE

## 2020-04-14 PROCEDURE — A9270 NON-COVERED ITEM OR SERVICE: HCPCS | Performed by: NURSE PRACTITIONER

## 2020-04-14 PROCEDURE — 85025 COMPLETE CBC W/AUTO DIFF WBC: CPT

## 2020-04-14 PROCEDURE — 83935 ASSAY OF URINE OSMOLALITY: CPT

## 2020-04-14 PROCEDURE — 700111 HCHG RX REV CODE 636 W/ 250 OVERRIDE (IP): Performed by: INTERNAL MEDICINE

## 2020-04-14 PROCEDURE — 99232 SBSQ HOSP IP/OBS MODERATE 35: CPT | Performed by: INTERNAL MEDICINE

## 2020-04-14 PROCEDURE — 99232 SBSQ HOSP IP/OBS MODERATE 35: CPT | Performed by: NURSE PRACTITIONER

## 2020-04-14 PROCEDURE — 700105 HCHG RX REV CODE 258: Performed by: INTERNAL MEDICINE

## 2020-04-14 PROCEDURE — 770001 HCHG ROOM/CARE - MED/SURG/GYN PRIV*

## 2020-04-14 PROCEDURE — 700102 HCHG RX REV CODE 250 W/ 637 OVERRIDE(OP): Performed by: NURSE PRACTITIONER

## 2020-04-14 PROCEDURE — A9270 NON-COVERED ITEM OR SERVICE: HCPCS | Performed by: SURGERY

## 2020-04-14 PROCEDURE — 92526 ORAL FUNCTION THERAPY: CPT

## 2020-04-14 PROCEDURE — 700102 HCHG RX REV CODE 250 W/ 637 OVERRIDE(OP): Performed by: SURGERY

## 2020-04-14 RX ORDER — FERROUS SULFATE 325(65) MG
325 TABLET ORAL
Status: DISCONTINUED | OUTPATIENT
Start: 2020-04-15 | End: 2020-04-21 | Stop reason: HOSPADM

## 2020-04-14 RX ORDER — ASCORBIC ACID 500 MG
500 TABLET ORAL DAILY
Status: DISCONTINUED | OUTPATIENT
Start: 2020-04-14 | End: 2020-04-21 | Stop reason: HOSPADM

## 2020-04-14 RX ORDER — NYSTATIN 100000 [USP'U]/G
POWDER TOPICAL 2 TIMES DAILY
Status: DISCONTINUED | OUTPATIENT
Start: 2020-04-14 | End: 2020-04-21 | Stop reason: HOSPADM

## 2020-04-14 RX ORDER — ATORVASTATIN CALCIUM 40 MG/1
40 TABLET, FILM COATED ORAL
Status: DISCONTINUED | OUTPATIENT
Start: 2020-04-14 | End: 2020-04-21 | Stop reason: HOSPADM

## 2020-04-14 RX ORDER — SODIUM CHLORIDE 9 MG/ML
INJECTION, SOLUTION INTRAVENOUS CONTINUOUS
Status: DISCONTINUED | OUTPATIENT
Start: 2020-04-15 | End: 2020-04-15

## 2020-04-14 RX ADMIN — ACETAMINOPHEN 650 MG: 325 TABLET, FILM COATED ORAL at 09:14

## 2020-04-14 RX ADMIN — OXYCODONE HYDROCHLORIDE 15 MG: 10 TABLET ORAL at 14:38

## 2020-04-14 RX ADMIN — ISOSORBIDE MONONITRATE 30 MG: 30 TABLET, EXTENDED RELEASE ORAL at 05:29

## 2020-04-14 RX ADMIN — INSULIN HUMAN 14 UNITS: 100 INJECTION, SUSPENSION SUBCUTANEOUS at 05:39

## 2020-04-14 RX ADMIN — Medication 500 MG: at 14:31

## 2020-04-14 RX ADMIN — INSULIN HUMAN 5 UNITS: 100 INJECTION, SUSPENSION SUBCUTANEOUS at 17:22

## 2020-04-14 RX ADMIN — ASPIRIN AND DIPYRIDAMOLE 1 CAPSULE: 25; 200 CAPSULE, EXTENDED RELEASE ORAL at 05:29

## 2020-04-14 RX ADMIN — ACETAMINOPHEN 650 MG: 325 TABLET, FILM COATED ORAL at 17:18

## 2020-04-14 RX ADMIN — NYSTATIN: 100000 POWDER TOPICAL at 17:18

## 2020-04-14 RX ADMIN — ISOSORBIDE MONONITRATE 30 MG: 30 TABLET, EXTENDED RELEASE ORAL at 17:18

## 2020-04-14 RX ADMIN — ATENOLOL 100 MG: 50 TABLET ORAL at 05:29

## 2020-04-14 RX ADMIN — PIPERACILLIN AND TAZOBACTAM 4.5 G: 4; .5 INJECTION, POWDER, LYOPHILIZED, FOR SOLUTION INTRAVENOUS; PARENTERAL at 05:30

## 2020-04-14 RX ADMIN — ATORVASTATIN CALCIUM 40 MG: 40 TABLET, FILM COATED ORAL at 22:14

## 2020-04-14 RX ADMIN — PIPERACILLIN AND TAZOBACTAM 4.5 G: 4; .5 INJECTION, POWDER, LYOPHILIZED, FOR SOLUTION INTRAVENOUS; PARENTERAL at 22:15

## 2020-04-14 RX ADMIN — OXYCODONE HYDROCHLORIDE 15 MG: 10 TABLET ORAL at 09:14

## 2020-04-14 RX ADMIN — ASPIRIN AND DIPYRIDAMOLE 1 CAPSULE: 25; 200 CAPSULE, EXTENDED RELEASE ORAL at 17:18

## 2020-04-14 RX ADMIN — OXYCODONE HYDROCHLORIDE 15 MG: 10 TABLET ORAL at 22:40

## 2020-04-14 RX ADMIN — AMITRIPTYLINE HYDROCHLORIDE 50 MG: 25 TABLET, FILM COATED ORAL at 22:14

## 2020-04-14 RX ADMIN — SENNOSIDES AND DOCUSATE SODIUM 2 TABLET: 8.6; 5 TABLET ORAL at 17:18

## 2020-04-14 RX ADMIN — HYDRALAZINE HYDROCHLORIDE 100 MG: 25 TABLET, FILM COATED ORAL at 17:18

## 2020-04-14 RX ADMIN — OXYCODONE HYDROCHLORIDE 15 MG: 10 TABLET ORAL at 02:49

## 2020-04-14 RX ADMIN — ACETAMINOPHEN 650 MG: 325 TABLET, FILM COATED ORAL at 02:49

## 2020-04-14 RX ADMIN — PIPERACILLIN AND TAZOBACTAM 4.5 G: 4; .5 INJECTION, POWDER, LYOPHILIZED, FOR SOLUTION INTRAVENOUS; PARENTERAL at 14:30

## 2020-04-14 RX ADMIN — LOSARTAN POTASSIUM 50 MG: 50 TABLET, FILM COATED ORAL at 05:29

## 2020-04-14 RX ADMIN — OMEPRAZOLE 20 MG: 20 CAPSULE, DELAYED RELEASE ORAL at 05:29

## 2020-04-14 RX ADMIN — SENNOSIDES AND DOCUSATE SODIUM 2 TABLET: 8.6; 5 TABLET ORAL at 05:29

## 2020-04-14 RX ADMIN — SPIRONOLACTONE 50 MG: 50 TABLET ORAL at 05:29

## 2020-04-14 RX ADMIN — SPIRONOLACTONE 50 MG: 50 TABLET ORAL at 17:18

## 2020-04-14 RX ADMIN — HYDRALAZINE HYDROCHLORIDE 100 MG: 25 TABLET, FILM COATED ORAL at 05:32

## 2020-04-14 RX ADMIN — CLOPIDOGREL BISULFATE 75 MG: 75 TABLET ORAL at 05:29

## 2020-04-14 RX ADMIN — AMLODIPINE BESYLATE 10 MG: 10 TABLET ORAL at 05:32

## 2020-04-14 ASSESSMENT — ENCOUNTER SYMPTOMS
VOMITING: 0
FEVER: 0
CONSTIPATION: 0
ABDOMINAL PAIN: 0
DIARRHEA: 0
DOUBLE VISION: 0
NAUSEA: 0
SHORTNESS OF BREATH: 0
FOCAL WEAKNESS: 1
PALPITATIONS: 0
BLURRED VISION: 0
MYALGIAS: 1
HEADACHES: 0
DIZZINESS: 0
COUGH: 0
CHILLS: 0

## 2020-04-14 NOTE — PROGRESS NOTES
I have personally seen and examined / evaluated the patient, discussed the patient's evaluation & management plan with SARAH Gillette and I have reviewed the note below.     I agree with the findings, history, examination and assessment / plan as listed below with changes/addendum as listed below by me in my addendum / attestation separetely.     Patient with underlying history of diabetes mellitus, hypertension, history of CVA, history of left AKA who was admitted from the correctional facility for evaluation of right diabetic foot ulcer.  Initiated on empiric antibiotic therapy, consultation from limb preservation service, vascular surgery obtained.  Patient underwent angiographic evaluation by vascular surgery on April 13, 2020.  Findings of aortoiliac occlusive disease.  Peripheral arterial disease.  LPS has involved orthopedics team for evaluation of BKA versus AKA.  Dr. Hairston evaluating.  Will defer the need for surgical interventions to the surgical team at this time.  Continue antiplatelet therapy, risk factor modifications including management of hypertension, dyslipidemia and diabetes mellitus.  Patient with growth of Pseudomonas aeruginosa from the wound.  If Patient underwent BKA or AKA, no further antibiotic therapy will be indicated.  If not, will need to obtain antibiotic guidance and duration of therapy from ID consult..  For now continue IV Zosyn.  Stop IV fluid hydration and monitor sodium levels.  We will continue close clinical monitoring.  Interval laboratory studies tomorrow.  Transition to high intensity statin therapy.    Leobardo Honeycutt M.D.  04/14/20  1:08 PM

## 2020-04-14 NOTE — PROGRESS NOTES
Report received from Katie GOODEN  Assumed care of patient at 1905.    Pt is A&O x 4.   Pain reported at 7/10. Pt was medicated per MAR.   Nausea denied   Tolerating Dysphagia 2, Nectar thick, DM diet.  Wheezing noted in upper airways. Lung sounds are clear over dim with no wheezing noted. Pt was encouraged to cough and use IS. Pt is motivated, but can only pull 500 on IS at this time. Wheezing improves with deep breathing and coughing.   Cath site to lt groin noted. Dressing CDI.   Wounds noted to anterior rt heel/calf x 5. 4/5 are covered with eschar. One is open with pink/moist wound bed. All are JESSICA. No drainage noted. Betadine was applied to sites per order and periwound skin was moisturized D/T generalized flaking and fragility.   Injury/redness noted to groin. Site was cleansed with soap and water. Barrier cream was applied. No drainage noted.  Skin breakdown noted to sacrum and extending to lt posterior thigh. Scant bloody drainage noted on sacrum only. Site was cleansed with soap and water. Barrier cream was applied. Mepilex is in place to sacrum.   + Urine output  + BM this shift.    + Flatus  LISSETTE mobility D/T pt refusal. Generalized weakness noted. LUE is rigid and NWB.   SCD's refused D/T pain.   Guards x 2 at bedside.  Bed in lowest position and locked.  Bed alarm in place and on.   Pt resting comfortably now.  Review plan of care with patient  Call light within reach  Hourly rounds in place  All needs met at this time

## 2020-04-14 NOTE — ASSESSMENT & PLAN NOTE
No evidence of active bleed  Iron 28  Continue ferrous sulfate and ascorbic acid.   Aspect of blood loss anemia secondary to surgery  Hgb stable.   Continue to follow cbc  Transfuse for hgb < 7.

## 2020-04-14 NOTE — PROGRESS NOTES
" LIMB PRESERVATION SERVICE  PROGRESS NOTE    HPI: 64 y.o.  with a past medical history that includes type 2 diabetes, CVA, obesity, left above-the-knee amputation (2017) admitted 4/9/2020 for DM ulcers RLE leg and foot.     LPS has been consulted for evaluation of diabetic ulcers to right foot and lower extremity.  The patient reports that he has had ulcers to his right foot and right lower leg for 3 weeks (3/23/2020) for which he has been managing with wound VAC and antibiotics.  His wounds have not improved despite these interventions.  Documentation show that his wound culture was growing Pseudomonas and enterococcus and he was treated with unknown antibiotics.  Arterial studies at the correctional facility reported to have been done but were not transferred.  Vascular surgery was consulted, completed repeat arterial studies which showed poor arterial blood flow to the right lower extremity.  Vascular surgery recommended angiogram before surgical intervention. The patient reports pain around his ulcers, surrounding erythema, drainage for an unknown duration of time.  The patient denies fever, chills, nausea, vomiting, chest pain, shortness of breath, headache.      PROCEDURE DATE: 4/13/20 by Dr. Dennis  PROCEDURE: SFA angioplasty      4/14/2020: Patient denies fevers, chills, nausea, vomiting.  Pain controlled except when right heel is palpated. Unfortunately does not have adequate blood flow to heal ulcers. Angioplasty was successful to ensure adequate flow to heal amputation per vascular surgery. Planning for BKA vs AKA to RLE, pt undecided.           EXAM:      /50   Pulse 61   Temp 36.3 °C (97.4 °F) (Temporal)   Resp 20   Ht 1.708 m (5' 7.24\")   Wt 90.1 kg (198 lb 10.2 oz)   SpO2 97%   BMI 30.89 kg/m²     Pedal Pulses: faintly palpable DP to right foot.  Unable to palpate PT  Sensation: Painful with palpation of right heel      Wound :  Right heel eschar fluctuant, tender to touch, beginning to " ooze    Right lateral ankle: Bone exposed    Right distal lateral lower leg ulcer: 100% slough, dry, circular    Right posterior lateral leg ulcer: 100% pink tissue, circular    Right anterior leg ulcer: Scab in place      Left AKA stump well approximated and protected with Mepilex      Wound Care:   RN to continue current wound care orders  Heel Mepilex in place to right foot        DIABETES MANAGEMENT:    Blood glucose:   Results from last 7 days   Lab Units 04/14/20  0538 04/13/20  1742 04/13/20  0526 04/12/20  0614 04/11/20  0603 04/10/20  2148 04/10/20  1728 04/10/20  1346   ACCU CHECK GLUCOSE 788 mg/dL 139* 156* 149* 196* 138* 166* 96 123*     A1c:   Lab Results   Component Value Date/Time    HBA1C 7.1 (H) 04/10/2020 04:25 AM            INFECTION MANAGEMENT:    Results from last 7 days   Lab Units 04/14/20  0024 04/12/20  0356 04/11/20  0332 04/10/20  0425   WBC 1501 K/uL 10.3 9.4 10.3 12.8*   PLATELET COUNT 1518 K/uL 306 284 298 320     Wound culture results:   Results     Procedure Component Value Units Date/Time    CULTURE WOUND W/ GRAM STAIN [[id number]]  (Abnormal)  (Susceptibility) Collected:  04/09/20 2340    Order Status:  Completed Specimen:  Wound from Left Foot Updated:  04/12/20 0803     Significant Indicator POS     Source WND     Site LEFT FOOT     Culture Result Moderate growth mixed skin adrianna.     Gram Stain Result No organisms seen.     Culture Result Pseudomonas aeruginosa  Light growth  P.aeruginosa may develop resistance during prolonged therapy  with all antibiotics. Isolates that are initially susceptible  may become resistant within three to four days after  initiation of therapy. Testing of repeat isolates may be  warranted.      Narrative:       Collected By:[id number] NASIR MORLEY  Collected By:[id number] NASIR MORLEY    Susceptibility     Pseudomonas aeruginosa (1)     Antibiotic Interpretation Microscan Method Status    Ceftazidime Sensitive 4 mcg/mL NICOLAS Final    Ciprofloxacin Sensitive <=1  mcg/mL NICOLAS Final    Cefepime Sensitive <=2 mcg/mL NICOLAS Final    Amikacin Sensitive <=16 mcg/mL NICOLAS Final    Gentamicin Sensitive <=4 mcg/mL NICOLAS Final    Tobramycin Sensitive <=4 mcg/mL NICOLAS Final    Meropenem Sensitive <=1 mcg/mL NICOLAS Final    Pip/Tazobactam Sensitive <=16 mcg/mL NICOLAS Final                   GRAM STAIN [[id number]] Collected:  04/09/20 2340    Order Status:  Completed Specimen:  Wound Updated:  04/10/20 1634     Significant Indicator .     Source WND     Site LEFT FOOT     Gram Stain Result No organisms seen.    Narrative:       Collected By:[id number] NASIR MORLEY  Collected By:[id number] NASIR MORLEY                     ASSESSMENT/PLAN:   Discussed differences between BKA and AKA with patient, afterwards patient leaning more towards AKA as he currently already has an AKA to LLE.  Patient standing to pivot and transfer but understands that he will most likely be utilizing slide board.  Patient does not have use of his left upper extremity due to previous CVA.    Plan for surgery tomorrow with Dr. Hairston, n.p.o. at midnight.        Wound care:   -Continue current wound care orders for nursing     Labs/Imaging:  -no further labs or imaging at this time    Vascular status:   -S/P right SFA angioplasty by Dr. Dennis on 4/13/2020.  Patient now with adequate blood flow to heal above the knee or below the knee amputation per vascular.    Surgery:   -N.p.o. at midnight  Plan for right BKA versus AKA with Dr. Hairston    Antibiotics:   -Continue current antibiotics.  Managed by hospitalist.    Weight Bearing Status:   -Non Weight bearing    Offloading:   May require knee immobilizer if decides to have BKA    PT/OT :   Consult post op      Diabetes Education:   -involved, last seen 4/13/20  - Implications of loss of protective sensation (LOPS) discussed with patient- including increased risk for amputation.  Advised to check feet at least daily, moisturize feet, and to always wear protective foot wear.   -avoid  trimming own nails. See podiatrist or certified foot and nail RN  -keep blood sugars <150 for improved wound healing        DISCHARGE PLAN:    Disposition: return to [facility]        Discussed with: pt, RN, Dr. Hairston     Please note that this dictation was created using voice recognition software. I have  worked with technical experts from [facility] to optimize the interface.  I have made every reasonable attempt to correct obvious errors, but there may be errors of grammar and possibly content that I did not discover before finalizing the note.    Ericka Molina, A.P.R.N.    If any questions or concerns, please call [phone]

## 2020-04-14 NOTE — DISCHARGE PLANNING
Pt is from [facility]. Will coordinate with Admission/Discahrge Coordinator once Pt is medically cleared for discharge.

## 2020-04-14 NOTE — PROGRESS NOTES
Consulted for AKA vs BKA  Either possible given vascular procedure and patients non ambulatory status  Happy to perform either at patient request tomorrow

## 2020-04-14 NOTE — PROGRESS NOTES
LIMB PRESERVATION SERVICE  PROGRESS NOTE        Was notified by vascular surgery the angiogram complete.  Please see his notes.  Additionally, was informed that patient's wounds would not heal.  Interventions were performed to facilitate healing of an amputation (above-the-knee amputation versus below the knee amputation).  Notified on-call orthopedic surgeon, Dr. Hairston, who agreed to see the patient.     Orthopedic consult to follow.  Continue wound care as previously noted in LPS note.     Gena Muñoz, APRN

## 2020-04-14 NOTE — PROGRESS NOTES
Diabetes education: Met with pt this afternoon. Please see consult note.  Plan: Pt unable to do his own skills as they are done by nursing at the facility. Encouraged him to have at least a protein at bedtime but no always his choice. No further education needs at this time. Please call [phone] or reorder DM education if needs change.

## 2020-04-14 NOTE — ASSESSMENT & PLAN NOTE
Appears chronic on review of records  High urine sodium with low serum osmo  Improved with IVF  Follow bmp.

## 2020-04-14 NOTE — CARE PLAN
Problem: Communication  Goal: The ability to communicate needs accurately and effectively will improve  Outcome: PROGRESSING SLOWER THAN EXPECTED  Intervention: [place] patient and significant other/support system to call light to alert staff of needs  Note: Pt and guards demonstrate use of call light. Pt does have hoarse/slurred speech, so communication is often difficult.      Problem: Knowledge Deficit  Goal: Knowledge of disease process/condition, treatment plan, diagnostic tests, and medications will improve  Outcome: PROGRESSING AS EXPECTED  Goal: Knowledge of the prescribed therapeutic regimen will improve  Outcome: PROGRESSING AS EXPECTED  Intervention: Discuss information regarding therpeutic regimen and document in education  Note: Pt was educated on POC, MAR, shift routine and nursing education R/T Dx. Questions were encouraged and answered. Pt verbalized understanding of all teaching.

## 2020-04-14 NOTE — WOUND TEAM
[facility]  Inpatient Services  Initial Wound and Skin Care Evaluation    Admission Date: 4/9/2020     Last order of IP CONSULT TO WOUND CARE was found on 4/13/2020 from Hospital Encounter on 4/9/2020     HPI, PMH, SH: Reviewed    Unit where seen by Wound Team: T419/00     WOUND CONSULT/FOLLOW UP RELATED TO:  Bilateral I.T.s, buttocks and sacrococcygeal area     Self Report / Pain Level:  Denies pain       OBJECTIVE:  Pt lying in bed with waffle overlay and pillows in place for positioning, mepilex in place over the L BKA stump    WOUND TYPE, LOCATION, CHARACTERISTICS (Pressure Injuries: location, stage, POA or date identified)  Moisture Associated Skin Damage 04/14/20 Buttock;Groin;Sacrum;Thigh (Active)   Wound Image        NEXT Weekly Photo (Inpatient Only) 04/21/20    Drainage Amount Scant    Drainage Description Serosanguineous    Periwound Assessment Red;Excoriated    IAD Cleansing Foam Cleanser/Washcloth    Periwound Protectant Antifungal Therapy    IAD Containment Device None    WOUND NURSE ONLY - Time Spent with Patient (mins) 60    Number of days: 0      Vascular:    CORDELIA:   CORDELIA Results, Last 30 Days Us-extremity Artery Lower Unilat Right    Result Date: 4/12/2020  Narrative Lower Extremity  Arterial Duplex Report  Vascular Laboratory  CONCLUSIONS  Multiple moderate to severe focal stenosis throughout the right femoral  artery, most a long the proximal aspect(>75%)  No flow seen in the posterior tibial or peroneal arteries.   Occluded anterior tibial artery with flow reconstitution at the ankle.  KATHARINE, FIVE  Exam Date:     04/11/2020 15:39  Room #:     Inpatient  Priority:     Routine  Ht (in):             Wt (lb):  Ordering Physician:        JUDIT RUBIO                              ([id number])  Referring Physician:       RAMIRO Norman  Sonographer:               Kiko Underwood RVT  Study Type:                Complete Unilateral  Technical Quality:         Adequate  Age:    64     Gender:     M  MRN:    [id number]  :    1955      BSA:  Indications:     Ulcer of lower extremity, Type 2 diabetes mellitus with foot                    ulcer  CPT Codes:       92784  ICD Codes:       707.1  E11.621  History:         prior exam on 17; left above knee amputation 17;                   presents with right heel & lateral foot ulcerations  Limitations:                RIGHT  Waveform        Peak Systolic Velocity (cm/s)                  Prox    Prox-Mid  Mid    Mid-Dist  Distal  Bi, non-                          157                      CFA  reversed  Triphasic       143                                        PFA  Monophasic      142      412      83      250      210     SFA  Monophasic                        108              80      POP  Monophasic      9                         0        31      AT  Absent          0                                  0       PT  Absent          0                                  0       ALTAF                LEFT  Waveform        Peak Systolic Velocity (cm/s)                  Prox    Prox-Mid  Mid    Mid-Dist  Distal                                                             CFA                                                             PFA                                                             SFA                                                             POP                                                             AT                                                             PT                                                             ALTAF  FINDINGS  RIGHT LEG:  Mild plaque in the common femoral & profunda femoral arteries.  Multiple focal stenosis through the femoral artery: greater than 75%  proximally, 50-75% prox-mid; 50-75% mid-distal, 50-75% distal  Mild plaque in the popliteal artery.  No flow seen in the posterior tibial or peroneal arteries.  The peroneal  artery is difficult to visualize.  The anterior tibial artery occludes with  flow reconstitution at the ankle.      YONI Results, Last 30 Days Us-yoni Single Level Unilat Right    Result Date: 2020  Narrative  Vascular Laboratory  Conclusions  YONI and TBI cannot acquired due ro patient condition.  Abnormal waveformsin the right lower extremity, consistent with moderate  arterial insufficiency.  Please refer to the arterial study for more details.  KATHARINE, RUTHIE  Age:    64    Gender:     M  MRN:    [id number]  :    1955      BSA:  Exam Date:     2020 16:53  Room #:     Inpatient  Priority:     Routine  Ht (in):             Wt (lb):  Ordering Physician:        JUDIT RUBIO                              ([id number])  Referring Physician:       [id number] Correa  Sonographer:               Kiko Underwood RVT  Study Type:                Limited Unilateral  Technical Quality:         Adequate  Indications:     Atherosclerosis of extremities with ulceration, Type 2                   diabetes mellitus with foot ulcer  CPT Codes:       96942  ICD Codes:       440.23  E11.621  History:         prior exam on 17; left above knee amputation 17;                   right heel & lateral foot ulcerations  Limitations:                 RIGHT  Waveform            Systolic BPs (mmHg)                             143           Brachial  Bi, non-                                 Common Femoral  reversed  Absent                                   Posterior Tibial  Monophasic                               Dorsalis Pedis                                           Peroneal                                           YONI                                           TBI                       LEFT  Waveform        Systolic BPs (mmHg)                             137           Brachial                                           Common Femoral                                           Posterior Tibial                                           Dorsalis Pedis                                            Peroneal                                           CORDELIA                                           TBI  Findings  Biphasic non-reversed waveforms at the common femoral with high amplitude.  Monophasic waveforms at the popliteal artery.  Absent posterior tibial artery waveforms.  CORDELIA's were not possible- patient couldn't stand the cuff placement, calf  too painful.  TBI was attempted.  A faint PPG signal was acquired on the great toe but  after placing the cuff, could not require the PPG signal, no toe-brachial  index could be obtained.  PPG waveforms were demonstrated in all other toes. (toes too small for  TBI's)  Arterial duplex scan was performed in accordance with lower extremity  arterial evaluation protocol - see separate report.  Jeremiah Sanchez MD  (Electronically Signed)  Final Date:      12 April 2020                   03:28    Lab Values:    Lab Results   Component Value Date/Time    WBC 10.3 04/14/2020 12:24 AM    RBC 4.27 (L) 04/14/2020 12:24 AM    HEMOGLOBIN 11.0 (L) 04/14/2020 12:24 AM    HEMATOCRIT 34.1 (L) 04/14/2020 12:24 AM    CREACTPROT 22.85 (H) 09/06/2017 10:58 AM    SEDRATEWES 36 (H) 09/06/2017 10:58 AM    HBA1C 7.1 (H) 04/10/2020 04:25 AM      Culture Results show:  Recent Results (from the past 720 hour(s))   CULTURE WOUND W/ GRAM STAIN    Collection Time: 04/09/20 11:40 PM   Result Value Ref Range    Significant Indicator POS (POS)     Source WND     Site LEFT FOOT     Culture Result Moderate growth mixed skin adrianna. (A)     Gram Stain Result No organisms seen.     Culture Result (A)      Pseudomonas aeruginosa  Light growth  P.aeruginosa may develop resistance during prolonged therapy  with all antibiotics. Isolates that are initially susceptible  may become resistant within three to four days after  initiation of therapy. Testing of repeat isolates may be  warranted.         Susceptibility    Pseudomonas aeruginosa - NICOLAS     Ceftazidime 4 Sensitive mcg/mL      Ciprofloxacin <=1 Sensitive mcg/mL     Cefepime <=2 Sensitive mcg/mL     Amikacin <=16 Sensitive mcg/mL     Gentamicin <=4 Sensitive mcg/mL     Tobramycin <=4 Sensitive mcg/mL     Meropenem <=1 Sensitive mcg/mL     Pip/Tazobactam <=16 Sensitive mcg/mL     INTERVENTIONS BY WOUND TEAM:  Chart and images reviewed. This RN discussed with bedside RN. This RN in with bedside CNA. Pt was turned to the left side. Sacrococcygeal area cleansed of small amount of stool. Sacrococcygeal area and bilateral I.T.s assessed and photographed. The area is completely blanching. There are 2 small partial thickness wounds related to moisture and friction. Sacral mepilex applied to the sacral area. Large portion of heel mepilex applied to the Left I.T. New chux was applied. Pt boosted in bed. Pts groin and panus briefly assessed and appears to have a red rash concerning for yeast like growth. Nystatin powder was ordered, discussed with bedside RN.    Interdisciplinary consultation: Patient, Bedside RN (Kwadwo), Bedside CNA (Ursula)    EVALUATION: Pt is a diabetic prisoner with an AKA. Pt presented with right leg wounds. Pt had decreased blood flow for which he underwent an angioplasty. Per chart review plan is now for BKA vs. AKA to the RLE. Pt has some partial thickness wounds to the sacrum and bilateral I.T.s related to moisture and friction as pt is primarily wheelchair bound. Pt also has yeast like growth to the groin and panus. Orders placed for nystatin for its antimicrobial properties.     Goals: Steady decrease in wound area and depth weekly.    NURSING PLAN OF CARE ORDERS (X):    Dressing changes: See Dressing Care orders:   Skin care: See Skin Care orders: X  Rectal tube care: See Rectal Tube Care orders:   Other orders:    RSKIN:   CURRENTLY IN PLACE (X), APPLIED THIS VISIT (A), ORDERED (O):   Q shift Jan:  X  Q shift pressure point assessments:  X  Pressure redistribution mattress X           Low Airloss          Bariatric  SEUN         Bariatric foam           Heel float boots     Heel Silicone dressing        Float Heels off Bed with Pillows A              Barrier wipes         Barrier Cream         Barrier paste          Sacral silicone dressing         Silicone O2 tubing         Anchorfast         Cannula fixation Device (Tender )          Gray Foam Ear protectors           Trach with Optifoam split foam                 Waffle cushion        Waffle Overlay X        Rectal tube or BMS    Purwick/Condom Cath          Antifungal tx  O    Interdry  O        Reposition q 2 hours X       Up to chair        Ambulate      PT/OT        Dietician        Diabetes Education      PO  X   TF     TPN     NPO   # days   Other        WOUND TEAM PLAN OF CARE:   Dressing changes by wound team:          Follow up 1-2 times weekly:               Follow up 3 times weekly:                NPWT change 3 times weekly:     Follow up as needed:   X    Other (explain):     Anticipated discharge plans: X, Pt is a prisoner  LTACH:        SNF/Rehab:                  Home Care:           Outpatient Wound Center:            Self Care:

## 2020-04-14 NOTE — PROGRESS NOTES
2 RN skin check complete.   Devices in place: preventive mepilex (elbows, LAKA and R heel), sacral mepilex, PIV, clothing.  Skin assessed under devices and all bony prominences.  Surgical incision noted to lt groin. Dressing CDI. Old blood noted around site. No drainage to dressing.   Confirmed wounds found on anterior R heel/ankle/calf x 5. Eschar noted to all except upper calf wound. JESSICA. No drainage noted.   Redness and injury noted to groin/panis. JESSICA. No drainage noted. Site was cleansed and barrier paste was applied.   Skin breakdown, fragility and flakiness noted to sacrum extending to lt posterior thigh. Site was cleansed. Barrier cream was applied. Scant bloody drainage noted to sacrum.   No new potential pressure ulcers/wounds noted.  Wound consult in place for existing wounds.  The following interventions in place: waffle mattress, preventative mepilex to R heel, L AKA and elbows, Q 2 Turns encouraged, R heel floated on pillows, pillows in use to support head and LUE.   Pt still declines turns and heel float boot at this time despite education.

## 2020-04-14 NOTE — PROGRESS NOTES
Hospital Medicine Daily Progress Note    Date of Service  4/14/2020    Chief Complaint  64 y.o. male admitted 4/9/2020 with Leg wound       Hospital Course    64 y.o. male who presented 4/9/2020 with stroke, DM, HTN who presented from correctional facility with right diabetic foot ulcers that have been not healing despite antibiotics and wound Vac.  His documents show that his wound culture was growing Pseudomonas and enterococcus and he was treated with oral antibiotics.  On admission Dr. Lebron was consulted.  He underwent RLE angiogram on 4/13 showing aortoiliac occlusive disease.  He is pending AKA vs BKA.  Ortho has been consulted.  Continue IV antibiotics and tight glycemic control.    Interval Problem Update  4/14:  Pending AKA vs BKA tomorrow.  This is to be determined per patient and surgery team.  Sodium noted be 126.  He does have a history of hyponatremia which appears to be chronic.  Workup ordered.  He does have pain to RLE.  Denies shortness of breath, nausea, or vomiting.  VSS.  Afebrile.     Consultants/Specialty  Vascular surgery  Ortho surgery.     Code Status  full    Disposition  TBD.     Review of Systems  Review of Systems   Constitutional: Negative for chills, fever and malaise/fatigue.   HENT: Negative for congestion.    Eyes: Negative for blurred vision and double vision.   Respiratory: Negative for cough and shortness of breath.    Cardiovascular: Negative for chest pain and palpitations.   Gastrointestinal: Negative for abdominal pain, constipation, diarrhea, nausea and vomiting.   Genitourinary: Negative for dysuria.   Musculoskeletal: Positive for myalgias.   Skin: Negative for itching and rash.   Neurological: Positive for focal weakness (chronic left sided weakness secondary to h/o cva. ). Negative for dizziness and headaches.        Physical Exam  Temp:  [36.3 °C (97.4 °F)-36.8 °C (98.2 °F)] 36.3 °C (97.4 °F)  Pulse:  [61-75] 61  Resp:  [18-20] 20  BP: (105-145)/(50-69)  105/50  SpO2:  [96 %-99 %] 97 %    Physical Exam  Constitutional:       General: He is not in acute distress.     Appearance: He is not toxic-appearing.      Comments: Chronically-ill appearing male.    HENT:      Head: Normocephalic and atraumatic.      Nose: Nose normal. No congestion.      Mouth/Throat:      Mouth: Mucous membranes are dry.      Pharynx: Oropharynx is clear. No oropharyngeal exudate.   Eyes:      General:         Right eye: No discharge.         Left eye: No discharge.      Conjunctiva/sclera: Conjunctivae normal.   Neck:      Musculoskeletal: Normal range of motion and neck supple. No neck rigidity or muscular tenderness.   Cardiovascular:      Rate and Rhythm: Normal rate and regular rhythm.      Heart sounds: Normal heart sounds. No murmur.   Pulmonary:      Effort: Pulmonary effort is normal. No respiratory distress.      Breath sounds: Normal breath sounds. No wheezing or rales.   Abdominal:      General: There is no distension.      Palpations: Abdomen is soft.      Tenderness: There is no abdominal tenderness.   Musculoskeletal: Normal range of motion.         General: Tenderness and deformity present.      Comments: Eschar and ulcers are noted on the  Lateral side of the right foot  Left AKA.   Skin:     General: Skin is warm and dry.      Coloration: Skin is not jaundiced or pale.   Neurological:      General: No focal deficit present.      Mental Status: He is alert and oriented to person, place, and time. Mental status is at baseline.      Motor: Weakness present.      Coordination: Coordination abnormal.      Comments: Chronic left hemiparesis and dysphagia secondary to history of CVA.    Psychiatric:         Mood and Affect: Mood normal.         Fluids    Intake/Output Summary (Last 24 hours) at 4/14/2020 1402  Last data filed at 4/14/2020 1200  Gross per 24 hour   Intake 1971.3 ml   Output 875 ml   Net 1096.3 ml       Laboratory  Recent Labs     04/12/20  0356 04/14/20  0024   WBC  9.4 10.3   RBC 3.72* 4.27*   HEMOGLOBIN 9.6* 11.0*   HEMATOCRIT 29.3* 34.1*   MCV 78.8* 79.9*   MCH 25.8* 25.8*   MCHC 32.8* 32.3*   RDW 41.5 42.1   PLATELETCT 284 306   MPV 9.4 9.6     Recent Labs     04/12/20  0356 04/14/20  0024   SODIUM 128* 126*   POTASSIUM 4.1 4.5   CHLORIDE 94* 93*   CO2 21 20   GLUCOSE 217* 182*   BUN 12 10   CREATININE 0.67 0.84   CALCIUM 9.0 9.1                   Imaging       Assessment/Plan  * Diabetic foot ulcer (HCC)- (present on admission)  Assessment & Plan  Multiple wounds of RLE  Not healing despite oral antibiotics and wound VAC  Wound culture with pseudomonas and enterococcus  Continue zosyn for now.    Vascular surgery following  S/P RLE angiogram on 4/13 showing aortoiliac occlusive disease  Pending right AKA vs BKA.   May be able to discontinue antibiotics if patient undergoes amputation.     PAD (peripheral artery disease) (HCC)- (present on admission)  Assessment & Plan  S/P RLE angiogram showing PAD and aortoiliac occlusive disease  Vascular surgery and ortho following.   Right AKA vs BKA tomorrow.   Continue lipitor, aggrenox, and plavix.     Dysphagia as late effect of stroke- (present on admission)  Assessment & Plan  History of  SLP following  Continue modified diet.     Hyponatremia- (present on admission)  Assessment & Plan  Appears chronic on review of records  Urine osmo, urine sodium, and serum osmo ordered  NS infusion overnight.   Asymptomatic  Continue to follow bmp.     History of stroke- (present on admission)  Assessment & Plan  With residual left-sided weakness and dysphasia  aggrenox  lovastatin    HTN (hypertension)- (present on admission)  Assessment & Plan  Will monitor  continue on Norvasc, hydralazine, Imdur, atenolol, losartan    DM (diabetes mellitus) (HCC)- (present on admission)  Assessment & Plan  hema A1c is 7.1  Continue on NPH and SSI.  Attempt to maintain BG < 150 to promote healing.   accu checks are noted  Diabetic diet    Iron deficiency  anemia  Assessment & Plan  No evidence of active bleed  Iron 28  Start ferrous sulfate and ascorbic acid.   Monitor hgb.        VTE prophylaxis: scds.  Anticoagulation held for surgery.

## 2020-04-14 NOTE — CARE PLAN
Problem: Pain Management  Goal: Pain level will decrease to patient's comfort goal  Outcome: PROGRESSING AS EXPECTED     Problem: Knowledge Deficit  Goal: Knowledge of disease process/condition, treatment plan, diagnostic tests, and medications will improve  Outcome: PROGRESSING AS EXPECTED     Pain well controlled with PRN oxy 15mg + tylenol     Pt able to state the plan, & was educated about the surgery tomorrow with ortho

## 2020-04-15 ENCOUNTER — ANESTHESIA EVENT (OUTPATIENT)
Dept: SURGERY | Facility: MEDICAL CENTER | Age: 65
DRG: 617 | End: 2020-04-15
Payer: MEDICAID

## 2020-04-15 ENCOUNTER — ANESTHESIA (OUTPATIENT)
Dept: SURGERY | Facility: MEDICAL CENTER | Age: 65
DRG: 617 | End: 2020-04-15
Payer: MEDICAID

## 2020-04-15 LAB
ALBUMIN SERPL BCP-MCNC: 3.4 G/DL (ref 3.2–4.9)
ALBUMIN/GLOB SERPL: 0.9 G/DL
ALP SERPL-CCNC: 67 U/L (ref 30–99)
ALT SERPL-CCNC: 6 U/L (ref 2–50)
ANION GAP SERPL CALC-SCNC: 12 MMOL/L (ref 7–16)
AST SERPL-CCNC: 7 U/L (ref 12–45)
BILIRUB SERPL-MCNC: 0.3 MG/DL (ref 0.1–1.5)
BUN SERPL-MCNC: 10 MG/DL (ref 8–22)
CALCIUM SERPL-MCNC: 9.1 MG/DL (ref 8.5–10.5)
CHLORIDE SERPL-SCNC: 91 MMOL/L (ref 96–112)
CO2 SERPL-SCNC: 23 MMOL/L (ref 20–33)
CREAT SERPL-MCNC: 0.72 MG/DL (ref 0.5–1.4)
EKG IMPRESSION: NORMAL
ERYTHROCYTE [DISTWIDTH] IN BLOOD BY AUTOMATED COUNT: 39.9 FL (ref 35.9–50)
GLOBULIN SER CALC-MCNC: 3.8 G/DL (ref 1.9–3.5)
GLUCOSE BLD-MCNC: 146 MG/DL (ref 65–99)
GLUCOSE BLD-MCNC: 160 MG/DL (ref 65–99)
GLUCOSE BLD-MCNC: 168 MG/DL (ref 65–99)
GLUCOSE SERPL-MCNC: 168 MG/DL (ref 65–99)
HCT VFR BLD AUTO: 29.2 % (ref 42–52)
HGB BLD-MCNC: 9.5 G/DL (ref 14–18)
MCH RBC QN AUTO: 25.7 PG (ref 27–33)
MCHC RBC AUTO-ENTMCNC: 32.5 G/DL (ref 33.7–35.3)
MCV RBC AUTO: 79.1 FL (ref 81.4–97.8)
PATHOLOGY CONSULT NOTE: NORMAL
PLATELET # BLD AUTO: 267 K/UL (ref 164–446)
PMV BLD AUTO: 9.7 FL (ref 9–12.9)
POTASSIUM SERPL-SCNC: 4.1 MMOL/L (ref 3.6–5.5)
PROT SERPL-MCNC: 7.2 G/DL (ref 6–8.2)
RBC # BLD AUTO: 3.69 M/UL (ref 4.7–6.1)
SODIUM SERPL-SCNC: 126 MMOL/L (ref 135–145)
WBC # BLD AUTO: 8.8 K/UL (ref 4.8–10.8)

## 2020-04-15 PROCEDURE — A6223 GAUZE >16<=48 NO W/SAL W/O B: HCPCS | Performed by: ORTHOPAEDIC SURGERY

## 2020-04-15 PROCEDURE — 700102 HCHG RX REV CODE 250 W/ 637 OVERRIDE(OP): Performed by: ANESTHESIOLOGY

## 2020-04-15 PROCEDURE — 93005 ELECTROCARDIOGRAM TRACING: CPT | Performed by: ORTHOPAEDIC SURGERY

## 2020-04-15 PROCEDURE — 85027 COMPLETE CBC AUTOMATED: CPT

## 2020-04-15 PROCEDURE — 501838 HCHG SUTURE GENERAL: Performed by: ORTHOPAEDIC SURGERY

## 2020-04-15 PROCEDURE — 160036 HCHG PACU - EA ADDL 30 MINS PHASE I: Performed by: ORTHOPAEDIC SURGERY

## 2020-04-15 PROCEDURE — 700111 HCHG RX REV CODE 636 W/ 250 OVERRIDE (IP): Performed by: NURSE PRACTITIONER

## 2020-04-15 PROCEDURE — 700105 HCHG RX REV CODE 258: Performed by: INTERNAL MEDICINE

## 2020-04-15 PROCEDURE — 82962 GLUCOSE BLOOD TEST: CPT

## 2020-04-15 PROCEDURE — 700111 HCHG RX REV CODE 636 W/ 250 OVERRIDE (IP): Performed by: INTERNAL MEDICINE

## 2020-04-15 PROCEDURE — 0Y6C0Z3 DETACHMENT AT RIGHT UPPER LEG, LOW, OPEN APPROACH: ICD-10-PCS | Performed by: ORTHOPAEDIC SURGERY

## 2020-04-15 PROCEDURE — 700111 HCHG RX REV CODE 636 W/ 250 OVERRIDE (IP): Performed by: ANESTHESIOLOGY

## 2020-04-15 PROCEDURE — 160035 HCHG PACU - 1ST 60 MINS PHASE I: Performed by: ORTHOPAEDIC SURGERY

## 2020-04-15 PROCEDURE — 88311 DECALCIFY TISSUE: CPT

## 2020-04-15 PROCEDURE — 700101 HCHG RX REV CODE 250: Performed by: ANESTHESIOLOGY

## 2020-04-15 PROCEDURE — A9270 NON-COVERED ITEM OR SERVICE: HCPCS | Performed by: FAMILY MEDICINE

## 2020-04-15 PROCEDURE — 700105 HCHG RX REV CODE 258: Performed by: NURSE PRACTITIONER

## 2020-04-15 PROCEDURE — 500881 HCHG PACK, EXTREMITY: Performed by: ORTHOPAEDIC SURGERY

## 2020-04-15 PROCEDURE — A9270 NON-COVERED ITEM OR SERVICE: HCPCS | Performed by: INTERNAL MEDICINE

## 2020-04-15 PROCEDURE — A9270 NON-COVERED ITEM OR SERVICE: HCPCS | Performed by: ANESTHESIOLOGY

## 2020-04-15 PROCEDURE — 700102 HCHG RX REV CODE 250 W/ 637 OVERRIDE(OP): Performed by: FAMILY MEDICINE

## 2020-04-15 PROCEDURE — 700102 HCHG RX REV CODE 250 W/ 637 OVERRIDE(OP): Performed by: INTERNAL MEDICINE

## 2020-04-15 PROCEDURE — 80053 COMPREHEN METABOLIC PANEL: CPT

## 2020-04-15 PROCEDURE — 99233 SBSQ HOSP IP/OBS HIGH 50: CPT | Performed by: INTERNAL MEDICINE

## 2020-04-15 PROCEDURE — 93010 ELECTROCARDIOGRAM REPORT: CPT | Performed by: INTERNAL MEDICINE

## 2020-04-15 PROCEDURE — 160009 HCHG ANES TIME/MIN: Performed by: ORTHOPAEDIC SURGERY

## 2020-04-15 PROCEDURE — 36415 COLL VENOUS BLD VENIPUNCTURE: CPT

## 2020-04-15 PROCEDURE — 770001 HCHG ROOM/CARE - MED/SURG/GYN PRIV*

## 2020-04-15 PROCEDURE — 160002 HCHG RECOVERY MINUTES (STAT): Performed by: ORTHOPAEDIC SURGERY

## 2020-04-15 PROCEDURE — 88307 TISSUE EXAM BY PATHOLOGIST: CPT

## 2020-04-15 PROCEDURE — 160039 HCHG SURGERY MINUTES - EA ADDL 1 MIN LEVEL 3: Performed by: ORTHOPAEDIC SURGERY

## 2020-04-15 PROCEDURE — 160028 HCHG SURGERY MINUTES - 1ST 30 MINS LEVEL 3: Performed by: ORTHOPAEDIC SURGERY

## 2020-04-15 PROCEDURE — 160048 HCHG OR STATISTICAL LEVEL 1-5: Performed by: ORTHOPAEDIC SURGERY

## 2020-04-15 RX ORDER — LABETALOL HYDROCHLORIDE 5 MG/ML
5 INJECTION, SOLUTION INTRAVENOUS
Status: DISCONTINUED | OUTPATIENT
Start: 2020-04-15 | End: 2020-04-15 | Stop reason: HOSPADM

## 2020-04-15 RX ORDER — MORPHINE SULFATE 4 MG/ML
1-4 INJECTION, SOLUTION INTRAMUSCULAR; INTRAVENOUS EVERY 4 HOURS PRN
Status: DISCONTINUED | OUTPATIENT
Start: 2020-04-15 | End: 2020-04-19

## 2020-04-15 RX ORDER — OXYCODONE HCL 5 MG/5 ML
10 SOLUTION, ORAL ORAL
Status: COMPLETED | OUTPATIENT
Start: 2020-04-15 | End: 2020-04-15

## 2020-04-15 RX ORDER — SODIUM CHLORIDE 9 MG/ML
INJECTION, SOLUTION INTRAVENOUS CONTINUOUS
Status: DISCONTINUED | OUTPATIENT
Start: 2020-04-15 | End: 2020-04-15 | Stop reason: HOSPADM

## 2020-04-15 RX ORDER — HYDROMORPHONE HYDROCHLORIDE 1 MG/ML
0.4 INJECTION, SOLUTION INTRAMUSCULAR; INTRAVENOUS; SUBCUTANEOUS
Status: DISCONTINUED | OUTPATIENT
Start: 2020-04-15 | End: 2020-04-15 | Stop reason: HOSPADM

## 2020-04-15 RX ORDER — HALOPERIDOL 5 MG/ML
1 INJECTION INTRAMUSCULAR
Status: DISCONTINUED | OUTPATIENT
Start: 2020-04-15 | End: 2020-04-15 | Stop reason: HOSPADM

## 2020-04-15 RX ORDER — HYDROMORPHONE HYDROCHLORIDE 1 MG/ML
0.1 INJECTION, SOLUTION INTRAMUSCULAR; INTRAVENOUS; SUBCUTANEOUS
Status: DISCONTINUED | OUTPATIENT
Start: 2020-04-15 | End: 2020-04-15 | Stop reason: HOSPADM

## 2020-04-15 RX ORDER — SUCCINYLCHOLINE/SOD CL,ISO/PF 200MG/10ML
SYRINGE (ML) INTRAVENOUS PRN
Status: DISCONTINUED | OUTPATIENT
Start: 2020-04-15 | End: 2020-04-15 | Stop reason: SURG

## 2020-04-15 RX ORDER — OXYCODONE HCL 5 MG/5 ML
5 SOLUTION, ORAL ORAL
Status: COMPLETED | OUTPATIENT
Start: 2020-04-15 | End: 2020-04-15

## 2020-04-15 RX ORDER — HYDRALAZINE HYDROCHLORIDE 20 MG/ML
5 INJECTION INTRAMUSCULAR; INTRAVENOUS
Status: DISCONTINUED | OUTPATIENT
Start: 2020-04-15 | End: 2020-04-15 | Stop reason: HOSPADM

## 2020-04-15 RX ORDER — HYDROMORPHONE HYDROCHLORIDE 2 MG/ML
INJECTION, SOLUTION INTRAMUSCULAR; INTRAVENOUS; SUBCUTANEOUS PRN
Status: DISCONTINUED | OUTPATIENT
Start: 2020-04-15 | End: 2020-04-15 | Stop reason: SURG

## 2020-04-15 RX ORDER — HYDROMORPHONE HYDROCHLORIDE 1 MG/ML
0.2 INJECTION, SOLUTION INTRAMUSCULAR; INTRAVENOUS; SUBCUTANEOUS
Status: DISCONTINUED | OUTPATIENT
Start: 2020-04-15 | End: 2020-04-15 | Stop reason: HOSPADM

## 2020-04-15 RX ORDER — ROCURONIUM BROMIDE 10 MG/ML
INJECTION, SOLUTION INTRAVENOUS PRN
Status: DISCONTINUED | OUTPATIENT
Start: 2020-04-15 | End: 2020-04-15 | Stop reason: SURG

## 2020-04-15 RX ORDER — ONDANSETRON 2 MG/ML
4 INJECTION INTRAMUSCULAR; INTRAVENOUS
Status: DISCONTINUED | OUTPATIENT
Start: 2020-04-15 | End: 2020-04-15 | Stop reason: HOSPADM

## 2020-04-15 RX ORDER — METOPROLOL TARTRATE 1 MG/ML
1 INJECTION, SOLUTION INTRAVENOUS
Status: DISCONTINUED | OUTPATIENT
Start: 2020-04-15 | End: 2020-04-15 | Stop reason: HOSPADM

## 2020-04-15 RX ORDER — LIDOCAINE HYDROCHLORIDE 20 MG/ML
INJECTION, SOLUTION EPIDURAL; INFILTRATION; INTRACAUDAL; PERINEURAL PRN
Status: DISCONTINUED | OUTPATIENT
Start: 2020-04-15 | End: 2020-04-15 | Stop reason: SURG

## 2020-04-15 RX ADMIN — ASPIRIN AND DIPYRIDAMOLE 1 CAPSULE: 25; 200 CAPSULE, EXTENDED RELEASE ORAL at 17:10

## 2020-04-15 RX ADMIN — ROCURONIUM BROMIDE 5 MG: 10 INJECTION, SOLUTION INTRAVENOUS at 09:47

## 2020-04-15 RX ADMIN — OXYCODONE HYDROCHLORIDE 15 MG: 10 TABLET ORAL at 15:46

## 2020-04-15 RX ADMIN — FENTANYL CITRATE 100 MCG: 50 INJECTION, SOLUTION INTRAMUSCULAR; INTRAVENOUS at 10:00

## 2020-04-15 RX ADMIN — MORPHINE SULFATE 4 MG: 4 INJECTION INTRAVENOUS at 21:34

## 2020-04-15 RX ADMIN — PIPERACILLIN AND TAZOBACTAM 4.5 G: 4; .5 INJECTION, POWDER, LYOPHILIZED, FOR SOLUTION INTRAVENOUS; PARENTERAL at 05:36

## 2020-04-15 RX ADMIN — MORPHINE SULFATE 2 MG: 4 INJECTION INTRAVENOUS at 13:13

## 2020-04-15 RX ADMIN — HYDROMORPHONE HYDROCHLORIDE 0.2 MG: 1 INJECTION, SOLUTION INTRAMUSCULAR; INTRAVENOUS; SUBCUTANEOUS at 12:49

## 2020-04-15 RX ADMIN — HYDROMORPHONE HYDROCHLORIDE 0.4 MG: 1 INJECTION, SOLUTION INTRAMUSCULAR; INTRAVENOUS; SUBCUTANEOUS at 12:15

## 2020-04-15 RX ADMIN — AMITRIPTYLINE HYDROCHLORIDE 50 MG: 25 TABLET, FILM COATED ORAL at 21:36

## 2020-04-15 RX ADMIN — PIPERACILLIN AND TAZOBACTAM 4.5 G: 4; .5 INJECTION, POWDER, LYOPHILIZED, FOR SOLUTION INTRAVENOUS; PARENTERAL at 13:15

## 2020-04-15 RX ADMIN — MORPHINE SULFATE 4 MG: 4 INJECTION INTRAVENOUS at 17:09

## 2020-04-15 RX ADMIN — FENTANYL CITRATE 150 MCG: 50 INJECTION, SOLUTION INTRAMUSCULAR; INTRAVENOUS at 09:45

## 2020-04-15 RX ADMIN — FENTANYL CITRATE 25 MCG: 50 INJECTION, SOLUTION INTRAMUSCULAR; INTRAVENOUS at 11:08

## 2020-04-15 RX ADMIN — HYDROMORPHONE HYDROCHLORIDE 0.4 MG: 1 INJECTION, SOLUTION INTRAMUSCULAR; INTRAVENOUS; SUBCUTANEOUS at 12:23

## 2020-04-15 RX ADMIN — FENTANYL CITRATE 25 MCG: 50 INJECTION, SOLUTION INTRAMUSCULAR; INTRAVENOUS at 11:10

## 2020-04-15 RX ADMIN — SENNOSIDES AND DOCUSATE SODIUM 2 TABLET: 8.6; 5 TABLET ORAL at 17:10

## 2020-04-15 RX ADMIN — Medication 160 MG: at 09:47

## 2020-04-15 RX ADMIN — ATORVASTATIN CALCIUM 40 MG: 40 TABLET, FILM COATED ORAL at 19:35

## 2020-04-15 RX ADMIN — PROPOFOL 20 MG: 10 INJECTION, EMULSION INTRAVENOUS at 10:18

## 2020-04-15 RX ADMIN — HYDROMORPHONE HYDROCHLORIDE 1 MG: 2 INJECTION, SOLUTION INTRAMUSCULAR; INTRAVENOUS; SUBCUTANEOUS at 10:03

## 2020-04-15 RX ADMIN — ACETAMINOPHEN 650 MG: 325 TABLET, FILM COATED ORAL at 13:13

## 2020-04-15 RX ADMIN — SODIUM CHLORIDE: 9 INJECTION, SOLUTION INTRAVENOUS at 00:00

## 2020-04-15 RX ADMIN — NYSTATIN: 100000 POWDER TOPICAL at 17:10

## 2020-04-15 RX ADMIN — HYDROMORPHONE HYDROCHLORIDE 0.2 MG: 1 INJECTION, SOLUTION INTRAMUSCULAR; INTRAVENOUS; SUBCUTANEOUS at 12:31

## 2020-04-15 RX ADMIN — HYDROMORPHONE HYDROCHLORIDE 1 MG: 2 INJECTION, SOLUTION INTRAMUSCULAR; INTRAVENOUS; SUBCUTANEOUS at 10:00

## 2020-04-15 RX ADMIN — OXYCODONE HYDROCHLORIDE 15 MG: 10 TABLET ORAL at 19:46

## 2020-04-15 RX ADMIN — LIDOCAINE HYDROCHLORIDE 100 MG: 20 INJECTION, SOLUTION EPIDURAL; INFILTRATION; INTRACAUDAL at 09:46

## 2020-04-15 RX ADMIN — INSULIN HUMAN 5 UNITS: 100 INJECTION, SUSPENSION SUBCUTANEOUS at 17:10

## 2020-04-15 RX ADMIN — FENTANYL CITRATE 50 MCG: 50 INJECTION, SOLUTION INTRAMUSCULAR; INTRAVENOUS at 11:44

## 2020-04-15 RX ADMIN — HYDRALAZINE HYDROCHLORIDE 100 MG: 25 TABLET, FILM COATED ORAL at 18:00

## 2020-04-15 RX ADMIN — OXYCODONE HYDROCHLORIDE 15 MG: 10 TABLET ORAL at 04:19

## 2020-04-15 RX ADMIN — PROPOFOL 160 MG: 10 INJECTION, EMULSION INTRAVENOUS at 09:47

## 2020-04-15 RX ADMIN — OXYCODONE HYDROCHLORIDE 10 MG: 5 SOLUTION ORAL at 11:37

## 2020-04-15 RX ADMIN — ISOSORBIDE MONONITRATE 30 MG: 30 TABLET, EXTENDED RELEASE ORAL at 17:10

## 2020-04-15 RX ADMIN — SPIRONOLACTONE 50 MG: 50 TABLET ORAL at 17:10

## 2020-04-15 ASSESSMENT — ENCOUNTER SYMPTOMS
SHORTNESS OF BREATH: 0
SORE THROAT: 0
DIZZINESS: 0
BACK PAIN: 0
PALPITATIONS: 0
FEVER: 0
NAUSEA: 0
TINGLING: 0
DIARRHEA: 0
HEADACHES: 0
FOCAL WEAKNESS: 1
ABDOMINAL PAIN: 0
VOMITING: 0
DOUBLE VISION: 0
EYE DISCHARGE: 0
BLURRED VISION: 0
MYALGIAS: 1

## 2020-04-15 ASSESSMENT — PAIN SCALES - GENERAL: PAIN_LEVEL: 5

## 2020-04-15 NOTE — OR NURSING
1031 Received pt from OR, received report from OR RN and anestheisologist. Pt sleeping, VS WNL. Right leg wrapped in ACe bandage, no bleeding observed.     1048 Pt waking up, oral airway dc'd without difficulty.     1108 Pt medicated with fent for pain 7/10 to surgical site.     1110 Pt medicated with fent for pain 7/10 to surgical site.     1125 Pt sitting up, tolerating liquids.     1140 Pt medicated with fent adan oxy for pain 7/10 to surgical site.     1200 Pt titrated to NC.    1215 Pt medicated with dilaudid for pain 7/10 to surgical site.     1225 Pt continue to c/o pain 8/10 to surgical site.     1231 Pt medicated with dilaudid for pain 6/20 to surgical site.     1245 Report to Kwadwo GOODEN, pt meets criteria for discharge from PACU.     1250 pt medicated with dilaudid for pain 5/10 to surgical site.     1300 Pt transitioned to floor via transport.

## 2020-04-15 NOTE — ANESTHESIA QCDR
2019 [facility] Clinical Data Registry (for Quality Improvement)     Postoperative nausea/vomiting risk protocol (Adult = 18 yrs and Pediatric 3-17 yrs)- (430 and 463)  General inhalation anesthetic (NOT TIVA) with PONV risk factors: No  Provision of anti-emetic therapy with at least 2 different classes of agents: N/A  Patient DID NOT receive anti-emetic therapy and reason is documented in Medical Record: N/A    Multimodal Pain Management- (477)  Non-emergent surgery AND patient age >= 18: No  Use of Multimodal Pain Management, two or more drugs and/or interventions, NOT including systemic opioids:   Exception: Documented allergy to multiple classes of analgesics:     Smoking Abstinence (404)  Patient is current smoker (cigarette, pipe, e-cig, marijuanna): No  Elective Surgery:   Abstinence instructions provided prior to day of surgery:   Patient abstained from smoking on day of surgery:     Pre-Op Beta-Blocker in Isolated CABG (44)  Isolated CABG AND patient age >= 18: No  Beta-blocker admin within 24 hours of surgical incision:   Exception:of medical reason(s) for not administering beta blocker within 24 hours prior to surgical incision (e.g., not  indicated,other medical reason):     PACU assessment of acute postoperative pain prior to Anesthesia Care End- Applies to Patients Age = 18- (ABG7)  Initial PACU pain score is which of the following: < 7/10  Patient unable to report pain score: N/A    Post-anesthetic transfer of care checklist/protocol to PACU/ICU- (426 and 427)  Upon conclusion of case, patient transferred to which of the following locations: PACU/Non-ICU  Use of transfer checklist/protocol: Yes  Exclusion: Service Performed in Patient Hospital Room (and thus did not require transfer): N/A  Unplanned admission to ICU related to anesthesia service up through end of PACU care- (MD51)  Unplanned admission to ICU (not initially anticipated at anesthesia start time): No

## 2020-04-15 NOTE — ANESTHESIA PROCEDURE NOTES
Airway  Date/Time: 4/15/2020 9:47 AM  Performed by: Addison Waller M.D.  Authorized by: Addison Waller M.D.     Location:  OR  Urgency:  Elective  Difficult Airway: No    Indications for Airway Management:  Anesthesia      Spontaneous Ventilation: absent    Sedation Level:  Deep  Preoxygenated: Yes    Patient Position:  Sniffing  Mask Difficulty Assessment:  0 - not attempted  Final Airway Type:  Endotracheal airway  Final Endotracheal Airway:  ETT  Cuffed: Yes    Technique Used for Successful ETT Placement:  Direct laryngoscopy  Devices/Methods Used in Placement:  Intubating stylet  Insertion Site:  Oral  Blade Type:  Maikol  Laryngoscope Blade/Videolaryngoscope Blade Size:  3  ETT Size (mm):  8.0  Measured from:  Lips  ETT to Lips (cm):  24  Placement Verified by: auscultation and capnometry    Cormack-Lehane Classification:  Grade IIa - partial view of glottis  Number of Attempts at Approach:  1  Ventilation Between Attempts:  None  Number of Other Approaches Attempted:  0

## 2020-04-15 NOTE — OP REPORT
DATE OF SERVICE:  04/15/2020    PREOPERATIVE DIAGNOSIS:  Necrotic right leg.    POSTOPERATIVE DIAGNOSIS:  Necrotic right leg.    PROCEDURE:  Right above-knee amputation.    SURGEON:  Fredy Isabel MD    ASSISTANT:  Eben Stoll PA-C    ESTIMATED BLOOD LOSS:  Minimal.    INDICATIONS:  This is a 64-year-old gentleman who had a failed bypass with no   flow distally.  As a result, he was consented for above-knee amputation.    Risks and benefits were discussed, which include but not limited to bleeding,   infection, neurovascular damage, pain, stiffness, and need for further   surgery.  He understands all these risks and wishes to proceed.    DESCRIPTION OF PROCEDURE:  The patient was sedated with LMA anesthesia and his   right lower extremity was prepped and draped in the usual sterile fashion.  A   well padded tourniquet was inflated to 200 mmHg prior to incision.  The right   leg was prepped and draped in the usual sterile fashion.  A standard   above-knee amputation was performed just above the metaphyseal ____.  The   neurovascular structures were all identified and tied off after tourniquet was   deflated.  Posterior fascia was closed to anterior fascia with #1 Vicryl   suture.  Skin was closed with 2-0 Vicryl suture and 2-0 nylon suture.  Sterile   dressing was applied.  The patient tolerated the procedure well.    POSTOPERATIVE PLAN:  The patient will be admitted on perioperative   antibiotics, pain control, and care per the medicine service.       ____________________________________     FREDY ISABEL MD PLA / NTS    DD:  04/15/2020 10:37:04  DT:  04/15/2020 10:44:23    D#:  [id number]  Job#:  [id number]

## 2020-04-15 NOTE — CARE PLAN
Problem: Communication  Goal: The ability to communicate needs accurately and effectively will improve  Outcome: PROGRESSING AS EXPECTED     Problem: Safety  Goal: Will remain free from injury  Outcome: PROGRESSING AS EXPECTED  Goal: Will remain free from falls  Outcome: PROGRESSING AS EXPECTED     Problem: Infection  Goal: Will remain free from infection  Outcome: PROGRESSING AS EXPECTED     Problem: Venous Thromboembolism (VTW)/Deep Vein Thrombosis (DVT) Prevention:  Goal: Patient will participate in Venous Thrombosis (VTE)/Deep Vein Thrombosis (DVT)Prevention Measures  Outcome: PROGRESSING AS EXPECTED     Problem: Bowel/Gastric:  Goal: Normal bowel function is maintained or improved  Outcome: PROGRESSING AS EXPECTED  Goal: Will not experience complications related to bowel motility  Outcome: PROGRESSING AS EXPECTED     Problem: Knowledge Deficit  Goal: Knowledge of disease process/condition, treatment plan, diagnostic tests, and medications will improve  Outcome: PROGRESSING AS EXPECTED  Goal: Knowledge of the prescribed therapeutic regimen will improve  Outcome: PROGRESSING AS EXPECTED     Problem: Discharge Barriers/Planning  Goal: Patient's continuum of care needs will be met  Outcome: PROGRESSING AS EXPECTED     Problem: Pain Management  Goal: Pain level will decrease to patient's comfort goal  Outcome: PROGRESSING AS EXPECTED     Problem: Skin Integrity  Goal: Risk for impaired skin integrity will decrease  Outcome: PROGRESSING AS EXPECTED     Problem: Respiratory:  Goal: Respiratory status will improve  Outcome: PROGRESSING AS EXPECTED     Problem: Mobility  Goal: Risk for activity intolerance will decrease  Outcome: PROGRESSING AS EXPECTED

## 2020-04-15 NOTE — ANESTHESIA PREPROCEDURE EVALUATION
Relevant Problems   NEURO   (+) History of stroke      CARDIAC   (+) HTN (hypertension)   (+) PAD (peripheral artery disease) (HCC)      Other   (+) DM (diabetes mellitus) (HCC)   (+) Diabetic foot ulcer (HCC)   (+) Dysphagia as late effect of stroke   (+) Hyponatremia   (+) Iron deficiency anemia   speech deficit 2/2 stroke    Physical Exam    Airway   Mallampati: II  TM distance: >3 FB  Neck ROM: full       Cardiovascular - normal exam  Rhythm: regular  Rate: normal  (-) murmur  Comments: By palpation of radial artery   Dental - normal exam         Pulmonary - normal exam  Breath sounds clear to auscultation     Abdominal   (+) obese     Neurological     Comments: A&Osx4, grossly intact             Anesthesia Plan    ASA 3 (IDDM. Hx of CVA c/b dysphagia. Obese. HTN. Anemia. hyponatremnia.)- EMERGENT (diabetic foot wound with osteomylitis)   ASA physical status 3 criteria: diabetes - poorly controlled and other (comment)ASA physical status emergent criteria: acute deteriorating condition due to infection    Plan - general       Airway plan will be ETT  (RSI)    Plan Factors:   Patient was not previously instructed to abstain from smoking on day of procedure.  Patient did not smoke on day of procedure.      Induction: intravenous    Postoperative Plan: Postoperative administration of opioids is intended.    Pertinent diagnostic labs and testing reviewed    Informed Consent:    Anesthetic plan and risks discussed with patient.    Use of blood products discussed with: patient whom consented to blood products.

## 2020-04-15 NOTE — CARE PLAN
Problem: Communication  Goal: The ability to communicate needs accurately and effectively will improve  Outcome: PROGRESSING AS EXPECTED     Problem: Pain Management  Goal: Pain level will decrease to patient's comfort goal  Outcome: PROGRESSING AS EXPECTED       R AKA today

## 2020-04-15 NOTE — PROGRESS NOTES
Hospital Medicine Daily Progress Note    Date of Service  4/15/2020    Chief Complaint  64 y.o. male admitted 4/9/2020 with Leg wound       Hospital Course    64 y.o. male who presented 4/9/2020 with stroke, DM, HTN who [facility] from [facility] with right diabetic foot ulcers that have been not healing despite antibiotics and wound Vac.  His documents show that his wound culture was growing Pseudomonas and enterococcus and he was treated with oral antibiotics.  On admission Dr. Lebron was consulted.  He underwent RLE angiogram on 4/13 showing aortoiliac occlusive disease.  He is pending AKA vs BKA.  Ortho has been consulted.  Continue IV antibiotics and tight glycemic control.    Interval Problem Update  4/14:  Pending AKA vs BKA tomorrow.  This is to be determined per patient and surgery team.  Sodium noted be 126.  He does have a history of hyponatremia which appears to be chronic.  Workup ordered.  He does have pain to RLE.  Denies shortness of breath, nausea, or vomiting.  VSS.  Afebrile.  4/15:  S/P AKA.  Discontinue antibiotics.  Does endorse pain to surgical site.  Surgical dressing in place.  Sodium 126.  Hold IVF and monitor.       Consultants/Specialty  Vascular surgery  Ortho surgery.     Code Status  full    Disposition  TBD.     Review of Systems  Review of Systems   Constitutional: Negative for fever and malaise/fatigue.   HENT: Negative for congestion and sore throat.    Eyes: Negative for blurred vision, double vision and discharge.   Respiratory: Negative for shortness of breath.    Cardiovascular: Negative for chest pain, palpitations and leg swelling.   Gastrointestinal: Negative for abdominal pain, diarrhea, nausea and vomiting.   Genitourinary: Negative for dysuria and hematuria.   Musculoskeletal: Positive for myalgias. Negative for back pain.   Skin: Negative for rash.   Neurological: Positive for focal weakness (chronic left sided weakness secondary to h/o cva. ). Negative for  dizziness, tingling and headaches.        Physical Exam  Temp:  [36.1 °C (97 °F)-37.2 °C (98.9 °F)] 36.3 °C (97.3 °F)  Pulse:  [62-84] 75  Resp:  [13-20] 17  BP: (122-163)/(44-94) 146/84  SpO2:  [93 %-99 %] 98 %    Physical Exam  Constitutional:       General: He is not in acute distress.     Appearance: He is not ill-appearing.      Comments: Chronically-ill appearing male.    HENT:      Head: Normocephalic and atraumatic.      Nose: Nose normal.      Mouth/Throat:      Mouth: Mucous membranes are dry.      Pharynx: Oropharynx is clear.   Eyes:      General: No scleral icterus.     Conjunctiva/sclera: Conjunctivae normal.   Neck:      Musculoskeletal: Normal range of motion and neck supple. No neck rigidity.   Cardiovascular:      Rate and Rhythm: Normal rate and regular rhythm.      Heart sounds: Normal heart sounds. No murmur. No friction rub.   Pulmonary:      Effort: Pulmonary effort is normal. No respiratory distress.      Breath sounds: Normal breath sounds. No wheezing.   Abdominal:      General: There is no distension.      Palpations: Abdomen is soft.      Tenderness: There is no abdominal tenderness. There is no guarding.   Musculoskeletal: Normal range of motion.         General: Tenderness and deformity present.      Comments: Eschar and ulcers are noted on the  Lateral side of the right foot  Left AKA.  S/P right AKA on 4/15.  Surgical dressing place.    Skin:     General: Skin is warm and dry.      Coloration: Skin is not jaundiced.      Findings: No bruising.   Neurological:      Mental Status: He is alert and oriented to person, place, and time. Mental status is at baseline.      Motor: Weakness present.      Coordination: Coordination abnormal.      Comments: Chronic left hemiparesis and dysphagia secondary to history of CVA.    Psychiatric:         Mood and Affect: Mood normal.         Fluids    Intake/Output Summary (Last 24 hours) at 4/15/2020 1401  Last data filed at 4/15/2020 1032  Gross per  24 hour   Intake 670 ml   Output 100 ml   Net 570 ml       Laboratory  Recent Labs     04/14/20  0024 04/15/20  0030   WBC 10.3 8.8   RBC 4.27* 3.69*   HEMOGLOBIN 11.0* 9.5*   HEMATOCRIT 34.1* 29.2*   MCV 79.9* 79.1*   MCH 25.8* 25.7*   MCHC 32.3* 32.5*   RDW 42.1 39.9   PLATELETCT 306 267   MPV 9.6 9.7     Recent Labs     04/14/20  0024 04/15/20  0030   SODIUM 126* 126*   POTASSIUM 4.5 4.1   CHLORIDE 93* 91*   CO2 20 23   GLUCOSE 182* 168*   BUN 10 10   CREATININE 0.84 0.72   CALCIUM 9.1 9.1                   Imaging       Assessment/Plan  * Diabetic foot ulcer (HCC)- (present on admission)  Assessment & Plan  Multiple wounds of RLE  Not healing despite oral antibiotics and wound VAC  Wound culture with pseudomonas and enterococcus   Vascular surgery following  S/P RLE angiogram on 4/13 showing aortoiliac occlusive disease  S/P right AKA today  D/C antibiotics  Ortho surgery following   Continue pain control  PT/OT    PAD (peripheral artery disease) ([facility])- (present on admission)  Assessment & Plan  S/P RLE angiogram showing PAD and aortoiliac occlusive disease  Vascular surgery and ortho following.   Right AKA today.   Continue lipitor, aggrenox, and plavix.     Dysphagia as late effect of stroke- (present on admission)  Assessment & Plan  History of  SLP following  Continue modified diet.     Hyponatremia- (present on admission)  Assessment & Plan  Appears chronic on review of records  High urine sodium with low serum osmo  ?SIADH  Hold IVF   Asymptomatic  Continue to follow bmp.     History of stroke- (present on admission)  Assessment & Plan  With residual left-sided weakness and dysphasia  Aggrenox, plavix, and lipitor.     HTN (hypertension)- (present on admission)  Assessment & Plan  Will monitor  continue on Norvasc, hydralazine, Imdur, atenolol, losartan    DM (diabetes mellitus) ([facility])- (present on admission)  Assessment & Plan  hema A1c is 7.1  Continue on NPH and SSI.  Attempt to maintain BG < 150 to  promote healing.   accu checks are noted  Diabetic diet    Iron deficiency anemia- (present on admission)  Assessment & Plan  No evidence of active bleed  Iron 28  Continue ferrous sulfate and ascorbic acid.   Monitor hgb.        VTE prophylaxis: scds.  Anticoagulation held for surgery.

## 2020-04-15 NOTE — PROGRESS NOTES
R AKA today with ortho     VSS    R stump CDI & covered     2mg morphine given for pain, resting comfortably now   2L NC     Q2 turns     Mepilex changed @ coccyx     IS use -- > 500 - 750 volume     Progressing well at this time

## 2020-04-15 NOTE — ANESTHESIA POSTPROCEDURE EVALUATION
Patient: [facility] [employer]    Procedure Summary     Date:  04/15/20 Room / Location:  [place] [address] / SURGERY [facility]    Anesthesia Start:  0938 Anesthesia Stop:      Procedure:  AMPUTATION, BELOW KNEE (Right Leg Upper) Diagnosis:  (NECROTIC FOOT)    Surgeon:  Fredy Hairston M.D. Responsible Provider:  Addison Waller M.D.    Anesthesia Type:  general ASA Status:  3 - Emergent          Final Anesthesia Type: general  Last vitals  BP   Blood Pressure: 122/44    Temp   37 °C (98.6 °F)    Pulse   Pulse: 71   Resp   14    SpO2   97 %      Anesthesia Post Evaluation    Patient location during evaluation: PACU  Patient participation: complete - patient participated  Level of consciousness: awake and alert  Pain score: 5    Airway patency: patent  Anesthetic complications: no  Cardiovascular status: hemodynamically stable  Respiratory status: acceptable  Hydration status: euvolemic    PONV: none           Nurse Pain Score: 5 (NPRS)

## 2020-04-15 NOTE — ANESTHESIA TIME REPORT
Anesthesia Start and Stop Event Times     Date Time Event    4/15/2020 0938 Anesthesia Start     0942 Ready for Procedure     1033 Anesthesia Stop        Responsible Staff  04/15/20    Name Role Begin End    Addison Waller M.D. Anesth 0938 1033        Preop Diagnosis (Free Text):  Pre-op Diagnosis     NECROTIC FOOT        Preop Diagnosis (Codes):    Post op Diagnosis  Foot ulcer with necrosis of bone, right (HCC)  diabetic foot wound    Premium Reason  Non-Premium    Comments:

## 2020-04-15 NOTE — PROGRESS NOTES
I have personally seen and examined / evaluated the patient, discussed the patient's evaluation & management plan with SARAH Gillette and I have reviewed the note below.     I agree with the findings, history, examination and assessment / plan as listed below with changes/addendum as listed below by me in my addendum / attestation separetely.     Patient with underlying history of diabetes mellitus, hypertension, history of CVA, history of left AKA who was admitted from the correctional facility for evaluation of right diabetic foot ulcer.  Initiated on empiric antibiotic therapy, consultation from limb preservation service, vascular surgery obtained.  Patient underwent angiographic evaluation by vascular surgery on April 13, 2020.  Findings of aortoiliac occlusive disease.  Peripheral arterial disease.  LPS has involved orthopedics team for evaluation of BKA versus AKA.  Dr. Hairston evaluating.  Will defer the need for surgical interventions to the surgical team at this time.  Continue antiplatelet therapy, risk factor modifications including management of hypertension, dyslipidemia and diabetes mellitus.  Patient with growth of Pseudomonas aeruginosa from the wound.  If Patient underwent BKA or AKA, no further antibiotic therapy will be indicated.  If not, will need to obtain antibiotic guidance and duration of therapy from ID consult..  For now continue IV Zosyn.  Stop IV fluid hydration and monitor sodium levels.  We will continue close clinical monitoring.  Interval laboratory studies tomorrow.  Transition to high intensity statin therapy.    Plan right above-knee amputation today with orthopedics.  We will de-escalate antibiotics over the next day or 2.  New pain control.    Leobardo Honeycutt M.D.  04/15/20  10:19 AM

## 2020-04-15 NOTE — DISCHARGE PLANNING
Pt is scheduled for Right AKA today. Will continue to follow the case and assist with discharge as needed. Pt is from [facility].

## 2020-04-16 LAB
ANION GAP SERPL CALC-SCNC: 16 MMOL/L (ref 7–16)
ANION GAP SERPL CALC-SCNC: 18 MMOL/L (ref 7–16)
BASOPHILS # BLD AUTO: 0.1 % (ref 0–1.8)
BASOPHILS # BLD: 0.01 K/UL (ref 0–0.12)
BUN SERPL-MCNC: 12 MG/DL (ref 8–22)
BUN SERPL-MCNC: 9 MG/DL (ref 8–22)
CALCIUM SERPL-MCNC: 8.9 MG/DL (ref 8.5–10.5)
CALCIUM SERPL-MCNC: 9 MG/DL (ref 8.5–10.5)
CHLORIDE SERPL-SCNC: 89 MMOL/L (ref 96–112)
CHLORIDE SERPL-SCNC: 89 MMOL/L (ref 96–112)
CO2 SERPL-SCNC: 18 MMOL/L (ref 20–33)
CO2 SERPL-SCNC: 19 MMOL/L (ref 20–33)
CREAT SERPL-MCNC: 0.68 MG/DL (ref 0.5–1.4)
CREAT SERPL-MCNC: 0.69 MG/DL (ref 0.5–1.4)
EOSINOPHIL # BLD AUTO: 0.01 K/UL (ref 0–0.51)
EOSINOPHIL NFR BLD: 0.1 % (ref 0–6.9)
ERYTHROCYTE [DISTWIDTH] IN BLOOD BY AUTOMATED COUNT: 40.9 FL (ref 35.9–50)
GLUCOSE BLD-MCNC: 217 MG/DL (ref 65–99)
GLUCOSE BLD-MCNC: 230 MG/DL (ref 65–99)
GLUCOSE BLD-MCNC: 264 MG/DL (ref 65–99)
GLUCOSE BLD-MCNC: 273 MG/DL (ref 65–99)
GLUCOSE SERPL-MCNC: 252 MG/DL (ref 65–99)
GLUCOSE SERPL-MCNC: 324 MG/DL (ref 65–99)
HCT VFR BLD AUTO: 29.4 % (ref 42–52)
HGB BLD-MCNC: 9.6 G/DL (ref 14–18)
IMM GRANULOCYTES # BLD AUTO: 0.08 K/UL (ref 0–0.11)
IMM GRANULOCYTES NFR BLD AUTO: 0.7 % (ref 0–0.9)
LYMPHOCYTES # BLD AUTO: 1.75 K/UL (ref 1–4.8)
LYMPHOCYTES NFR BLD: 15.7 % (ref 22–41)
MCH RBC QN AUTO: 25.9 PG (ref 27–33)
MCHC RBC AUTO-ENTMCNC: 32.7 G/DL (ref 33.7–35.3)
MCV RBC AUTO: 79.5 FL (ref 81.4–97.8)
MONOCYTES # BLD AUTO: 0.9 K/UL (ref 0–0.85)
MONOCYTES NFR BLD AUTO: 8.1 % (ref 0–13.4)
NEUTROPHILS # BLD AUTO: 8.41 K/UL (ref 1.82–7.42)
NEUTROPHILS NFR BLD: 75.3 % (ref 44–72)
NRBC # BLD AUTO: 0 K/UL
NRBC BLD-RTO: 0 /100 WBC
PLATELET # BLD AUTO: 269 K/UL (ref 164–446)
PMV BLD AUTO: 9.4 FL (ref 9–12.9)
POTASSIUM SERPL-SCNC: 4.3 MMOL/L (ref 3.6–5.5)
POTASSIUM SERPL-SCNC: 4.5 MMOL/L (ref 3.6–5.5)
RBC # BLD AUTO: 3.7 M/UL (ref 4.7–6.1)
SODIUM SERPL-SCNC: 124 MMOL/L (ref 135–145)
SODIUM SERPL-SCNC: 125 MMOL/L (ref 135–145)
WBC # BLD AUTO: 11.2 K/UL (ref 4.8–10.8)

## 2020-04-16 PROCEDURE — 92526 ORAL FUNCTION THERAPY: CPT

## 2020-04-16 PROCEDURE — 80048 BASIC METABOLIC PNL TOTAL CA: CPT

## 2020-04-16 PROCEDURE — A9270 NON-COVERED ITEM OR SERVICE: HCPCS | Performed by: FAMILY MEDICINE

## 2020-04-16 PROCEDURE — 97161 PT EVAL LOW COMPLEX 20 MIN: CPT

## 2020-04-16 PROCEDURE — 99233 SBSQ HOSP IP/OBS HIGH 50: CPT | Performed by: INTERNAL MEDICINE

## 2020-04-16 PROCEDURE — 770001 HCHG ROOM/CARE - MED/SURG/GYN PRIV*

## 2020-04-16 PROCEDURE — 97165 OT EVAL LOW COMPLEX 30 MIN: CPT

## 2020-04-16 PROCEDURE — A9270 NON-COVERED ITEM OR SERVICE: HCPCS | Performed by: INTERNAL MEDICINE

## 2020-04-16 PROCEDURE — 700102 HCHG RX REV CODE 250 W/ 637 OVERRIDE(OP): Performed by: FAMILY MEDICINE

## 2020-04-16 PROCEDURE — 700102 HCHG RX REV CODE 250 W/ 637 OVERRIDE(OP): Performed by: INTERNAL MEDICINE

## 2020-04-16 PROCEDURE — A9270 NON-COVERED ITEM OR SERVICE: HCPCS | Performed by: PHYSICIAN ASSISTANT

## 2020-04-16 PROCEDURE — 82962 GLUCOSE BLOOD TEST: CPT | Mod: 91

## 2020-04-16 PROCEDURE — A9270 NON-COVERED ITEM OR SERVICE: HCPCS | Performed by: NURSE PRACTITIONER

## 2020-04-16 PROCEDURE — 85025 COMPLETE CBC W/AUTO DIFF WBC: CPT

## 2020-04-16 PROCEDURE — 700102 HCHG RX REV CODE 250 W/ 637 OVERRIDE(OP): Performed by: PHYSICIAN ASSISTANT

## 2020-04-16 PROCEDURE — 700102 HCHG RX REV CODE 250 W/ 637 OVERRIDE(OP): Performed by: SURGERY

## 2020-04-16 PROCEDURE — 700102 HCHG RX REV CODE 250 W/ 637 OVERRIDE(OP): Performed by: NURSE PRACTITIONER

## 2020-04-16 PROCEDURE — 700111 HCHG RX REV CODE 636 W/ 250 OVERRIDE (IP): Performed by: NURSE PRACTITIONER

## 2020-04-16 PROCEDURE — A9270 NON-COVERED ITEM OR SERVICE: HCPCS | Performed by: SURGERY

## 2020-04-16 PROCEDURE — 700105 HCHG RX REV CODE 258: Performed by: NURSE PRACTITIONER

## 2020-04-16 PROCEDURE — 36415 COLL VENOUS BLD VENIPUNCTURE: CPT

## 2020-04-16 RX ORDER — DEXTROSE MONOHYDRATE 25 G/50ML
50 INJECTION, SOLUTION INTRAVENOUS
Status: DISCONTINUED | OUTPATIENT
Start: 2020-04-16 | End: 2020-04-17

## 2020-04-16 RX ORDER — SODIUM CHLORIDE 9 MG/ML
INJECTION, SOLUTION INTRAVENOUS CONTINUOUS
Status: ACTIVE | OUTPATIENT
Start: 2020-04-16 | End: 2020-04-16

## 2020-04-16 RX ORDER — GABAPENTIN 100 MG/1
100 CAPSULE ORAL 3 TIMES DAILY
Status: DISCONTINUED | OUTPATIENT
Start: 2020-04-16 | End: 2020-04-16

## 2020-04-16 RX ORDER — PREGABALIN 75 MG/1
75 CAPSULE ORAL 3 TIMES DAILY
Status: DISCONTINUED | OUTPATIENT
Start: 2020-04-16 | End: 2020-04-21 | Stop reason: HOSPADM

## 2020-04-16 RX ADMIN — SENNOSIDES AND DOCUSATE SODIUM 2 TABLET: 8.6; 5 TABLET ORAL at 05:19

## 2020-04-16 RX ADMIN — INSULIN HUMAN 3 UNITS: 100 INJECTION, SOLUTION PARENTERAL at 21:54

## 2020-04-16 RX ADMIN — PIPERACILLIN AND TAZOBACTAM 4.5 G: 4; .5 INJECTION, POWDER, LYOPHILIZED, FOR SOLUTION INTRAVENOUS; PARENTERAL at 13:58

## 2020-04-16 RX ADMIN — INSULIN HUMAN 5 UNITS: 100 INJECTION, SUSPENSION SUBCUTANEOUS at 17:41

## 2020-04-16 RX ADMIN — INSULIN HUMAN 5 UNITS: 100 INJECTION, SOLUTION PARENTERAL at 17:41

## 2020-04-16 RX ADMIN — FERROUS SULFATE TAB 325 MG (65 MG ELEMENTAL FE) 325 MG: 325 (65 FE) TAB at 08:22

## 2020-04-16 RX ADMIN — HYDRALAZINE HYDROCHLORIDE 100 MG: 25 TABLET, FILM COATED ORAL at 17:36

## 2020-04-16 RX ADMIN — PREGABALIN 75 MG: 75 CAPSULE ORAL at 10:18

## 2020-04-16 RX ADMIN — NYSTATIN: 100000 POWDER TOPICAL at 05:21

## 2020-04-16 RX ADMIN — INSULIN HUMAN 14 UNITS: 100 INJECTION, SUSPENSION SUBCUTANEOUS at 05:27

## 2020-04-16 RX ADMIN — MORPHINE SULFATE 4 MG: 4 INJECTION INTRAVENOUS at 02:45

## 2020-04-16 RX ADMIN — AMLODIPINE BESYLATE 10 MG: 10 TABLET ORAL at 05:20

## 2020-04-16 RX ADMIN — ASPIRIN AND DIPYRIDAMOLE 1 CAPSULE: 25; 200 CAPSULE, EXTENDED RELEASE ORAL at 05:20

## 2020-04-16 RX ADMIN — PREGABALIN 75 MG: 75 CAPSULE ORAL at 17:35

## 2020-04-16 RX ADMIN — OXYCODONE HYDROCHLORIDE 15 MG: 10 TABLET ORAL at 09:06

## 2020-04-16 RX ADMIN — SPIRONOLACTONE 50 MG: 50 TABLET ORAL at 17:36

## 2020-04-16 RX ADMIN — SPIRONOLACTONE 50 MG: 50 TABLET ORAL at 05:21

## 2020-04-16 RX ADMIN — OXYCODONE HYDROCHLORIDE 15 MG: 10 TABLET ORAL at 13:58

## 2020-04-16 RX ADMIN — ACETAMINOPHEN 650 MG: 325 TABLET, FILM COATED ORAL at 08:22

## 2020-04-16 RX ADMIN — NYSTATIN: 100000 POWDER TOPICAL at 18:00

## 2020-04-16 RX ADMIN — OXYCODONE HYDROCHLORIDE 15 MG: 10 TABLET ORAL at 05:20

## 2020-04-16 RX ADMIN — OXYCODONE HYDROCHLORIDE 15 MG: 10 TABLET ORAL at 01:01

## 2020-04-16 RX ADMIN — CLOPIDOGREL BISULFATE 75 MG: 75 TABLET ORAL at 05:20

## 2020-04-16 RX ADMIN — ATENOLOL 100 MG: 50 TABLET ORAL at 05:21

## 2020-04-16 RX ADMIN — ATORVASTATIN CALCIUM 40 MG: 40 TABLET, FILM COATED ORAL at 21:42

## 2020-04-16 RX ADMIN — ASPIRIN AND DIPYRIDAMOLE 1 CAPSULE: 25; 200 CAPSULE, EXTENDED RELEASE ORAL at 17:35

## 2020-04-16 RX ADMIN — ISOSORBIDE MONONITRATE 30 MG: 30 TABLET, EXTENDED RELEASE ORAL at 17:36

## 2020-04-16 RX ADMIN — Medication 500 MG: at 05:19

## 2020-04-16 RX ADMIN — INSULIN HUMAN 5 UNITS: 100 INJECTION, SOLUTION PARENTERAL at 10:27

## 2020-04-16 RX ADMIN — ISOSORBIDE MONONITRATE 30 MG: 30 TABLET, EXTENDED RELEASE ORAL at 05:21

## 2020-04-16 RX ADMIN — LOSARTAN POTASSIUM 50 MG: 50 TABLET, FILM COATED ORAL at 05:21

## 2020-04-16 RX ADMIN — PIPERACILLIN AND TAZOBACTAM 4.5 G: 4; .5 INJECTION, POWDER, LYOPHILIZED, FOR SOLUTION INTRAVENOUS; PARENTERAL at 10:17

## 2020-04-16 RX ADMIN — MORPHINE SULFATE 4 MG: 4 INJECTION INTRAVENOUS at 12:00

## 2020-04-16 RX ADMIN — HYDRALAZINE HYDROCHLORIDE 100 MG: 25 TABLET, FILM COATED ORAL at 05:19

## 2020-04-16 RX ADMIN — AMITRIPTYLINE HYDROCHLORIDE 50 MG: 25 TABLET, FILM COATED ORAL at 21:42

## 2020-04-16 RX ADMIN — PIPERACILLIN AND TAZOBACTAM 4.5 G: 4; .5 INJECTION, POWDER, LYOPHILIZED, FOR SOLUTION INTRAVENOUS; PARENTERAL at 21:43

## 2020-04-16 RX ADMIN — OXYCODONE HYDROCHLORIDE 15 MG: 10 TABLET ORAL at 22:10

## 2020-04-16 RX ADMIN — OMEPRAZOLE 20 MG: 20 CAPSULE, DELAYED RELEASE ORAL at 05:20

## 2020-04-16 RX ADMIN — MORPHINE SULFATE 4 MG: 4 INJECTION INTRAVENOUS at 07:26

## 2020-04-16 ASSESSMENT — ENCOUNTER SYMPTOMS
CHILLS: 0
SHORTNESS OF BREATH: 0
DIARRHEA: 0
VOMITING: 0
FOCAL WEAKNESS: 1
COUGH: 0
NAUSEA: 0
FEVER: 0
MYALGIAS: 1
HEADACHES: 0
ABDOMINAL PAIN: 0
BLURRED VISION: 0
DOUBLE VISION: 0
DIZZINESS: 0
CONSTIPATION: 0

## 2020-04-16 ASSESSMENT — COGNITIVE AND FUNCTIONAL STATUS - GENERAL
DAILY ACTIVITIY SCORE: 12
HELP NEEDED FOR BATHING: A LOT
DRESSING REGULAR LOWER BODY CLOTHING: TOTAL
EATING MEALS: A LITTLE
TOILETING: TOTAL
DRESSING REGULAR UPPER BODY CLOTHING: A LOT
PERSONAL GROOMING: A LITTLE
SUGGESTED CMS G CODE MODIFIER DAILY ACTIVITY: CL

## 2020-04-16 ASSESSMENT — GAIT ASSESSMENTS: GAIT LEVEL OF ASSIST: UNABLE TO PARTICIPATE

## 2020-04-16 ASSESSMENT — ACTIVITIES OF DAILY LIVING (ADL): TOILETING: REQUIRES ASSIST

## 2020-04-16 NOTE — PROGRESS NOTES
Report received from day shift RN  Patient AAO x 4, Calls appropriately. Expressive aphasia, dysphagia, and slurred speech at baseline r/t previous CVA.  VSS, RA, Afebrile  Rating pain as 7/10. PRN oxycodone and morphine administered.   Tolerating current diet. Denies N/V this shift.  Patient has new R AKA as of today. Dressing on stump is bloody and leaking onto bed. BP and HR stable. Will reinforce dressing with ace bandage and continue to closely monitor.   PIV is in R upper arm, saline locked.  Guards in room.     Reviewed plan of care with patient. Call light within reach. All needs met at this time.

## 2020-04-16 NOTE — PROGRESS NOTES
"   Orthopaedic PA Progress Note    Interval changes:C/O pain, per RN is noncompliant with periodic movements to minimize skin breakdown, dressing reinforced overnight/early this AM    ROS - Patient denies any new issues. No chest pain, dyspnea, or fever.  Pain well controlled.    /77   Pulse (!) 104   Temp 36.9 °C (98.5 °F) (Temporal)   Resp 18   Ht 1.708 m (5' 7.24\")   Wt 90.1 kg (198 lb 10.2 oz)   SpO2 94%     Patient seen and examined  No acute distress  Breathing non labored  RRR  Proximal compartments soft  Dressing CDI    Recent Labs     04/14/20  0024 04/15/20  0030 04/16/20  0330   WBC 10.3 8.8 11.2*   RBC 4.27* 3.69* 3.70*   HEMOGLOBIN 11.0* 9.5* 9.6*   HEMATOCRIT 34.1* 29.2* 29.4*   MCV 79.9* 79.1* 79.5*   MCH 25.8* 25.7* 25.9*   MCHC 32.3* 32.5* 32.7*   RDW 42.1 39.9 40.9   PLATELETCT 306 267 269   MPV 9.6 9.7 9.4       Active Hospital Problems    Diagnosis   • Diabetic foot ulcer (HCC) [E11.621, L97.509]     Priority: High   • PAD (peripheral artery disease) ([facility]) [I73.9]     Priority: High   • Dysphagia as late effect of stroke [I69.391]     Priority: Medium   • DM (diabetes mellitus) ([facility]) [E11.9]     Priority: Medium   • History of stroke [Z86.73]     Priority: Medium   • HTN (hypertension) [I10]     Priority: Medium   • Hyponatremia [E87.1]     Priority: Medium   • Iron deficiency anemia [D50.9]       Assessment/Plan:  POD#1 S/P R AKA  Wt bearing status - NWB RLE  PT/OT-initiated  Wound care:dressing left in place  Drains - no  Vaz-no  Sutures out 21 days post operatively  Antibiotics: complete  DVT Prophylaxis- TEDS/SCDs/Foot pumps.   Lovenox: Not indicated  Future Procedures - none planned  Case Coordination for Discharge Planning - Disposition per Med.   Follow-Up: needs appointment with Dr. Hairston at [facility] in ~ 3 weeks      "

## 2020-04-16 NOTE — PROGRESS NOTES
Patient c/o pain with movement to right stump, patient extensively educated on importance of turning and not putting pressure on bony prominences. Patient with be turned Q2 hours, and in between when calling. Hx of stroke with left sided defcits baseline, dysphagia, and slurred speech.      2 RN skin check complete.   Devices in place SpO2 finger probe,  PIV line.   Skin assessed under devices, Waffle mattress in place.  Confirmed pressure ulcers found on admission, but patient under went R AKA  Q2 hour turns.      The following interventions in place   incision orthopedic with ace wrap, clarify  with surgeon RN not to remove dressing only to re-enforce.   Generalized Bilat 1+ edema noted.  Skin beneath folds checked, panus red/pink, nystatin in use, pannus and left armpit.    Preventative measures in place:  -q 2hr turns with use of pillows to support repositioning  -bony prominences floated on pillows  -prophylactic mepilex dressings over hips,   -diet regular diabetic  -mobilization  -repositioning of devices   -condom cath placed

## 2020-04-16 NOTE — PROGRESS NOTES
PT/OT worker with patient and recommended using jose r lift when getting to wheelchair which is baseline.

## 2020-04-16 NOTE — PROGRESS NOTES
Hospital Medicine Daily Progress Note    Date of Service  4/16/2020    Chief Complaint  64 y.o. male admitted 4/9/2020 with Leg wound       Hospital Course    64 y.o. male who presented 4/9/2020 with stroke, DM, HTN who presented from correctional facility with right diabetic foot ulcers that have been not healing despite antibiotics and wound Vac.  His documents show that his wound culture was growing Pseudomonas and enterococcus and he was treated with oral antibiotics.  On admission Dr. Lebron was consulted.  He underwent RLE angiogram on 4/13 showing aortoiliac occlusive disease.  He is pending AKA vs BKA.  Ortho has been consulted.  Continue IV antibiotics and tight glycemic control.    Interval Problem Update  4/14:  Pending AKA vs BKA tomorrow.  This is to be determined per patient and surgery team.  Sodium noted be 126.  He does have a history of hyponatremia which appears to be chronic.  Workup ordered.  He does have pain to RLE.  Denies shortness of breath, nausea, or vomiting.  VSS.  Afebrile.  4/15:  S/P AKA.  Discontinue antibiotics.  Does endorse pain to surgical site.  Surgical dressing in place.  Sodium 126.  Hold IVF and monitor.     4/16:  POD #1.  Pain uncontrolled.  Medications adjusted.  Bleeding from surgical site overnight, although resolved with reinforcement.  Sodium 125.  Continue fluid restriction.  Repeat BMP.  Afebrile.  VSS.     Consultants/Specialty  Vascular surgery  Ortho surgery.     Code Status  full    Disposition  TBD.     Review of Systems  Review of Systems   Constitutional: Positive for malaise/fatigue. Negative for chills and fever.   HENT: Negative for congestion.    Eyes: Negative for blurred vision and double vision.   Respiratory: Negative for cough and shortness of breath.    Cardiovascular: Negative for chest pain and leg swelling.   Gastrointestinal: Negative for abdominal pain, constipation, diarrhea, nausea and vomiting.   Genitourinary: Negative for dysuria.    Musculoskeletal: Positive for myalgias.   Skin: Negative for itching and rash.   Neurological: Positive for focal weakness (chronic left sided weakness secondary to h/o cva. ). Negative for dizziness and headaches.        Physical Exam  Temp:  [36.2 °C (97.1 °F)-37 °C (98.6 °F)] 36.9 °C (98.5 °F)  Pulse:  [] 104  Resp:  [13-19] 18  BP: (122-163)/(44-94) 147/77  SpO2:  [92 %-99 %] 94 %    Physical Exam  Constitutional:       General: He is not in acute distress.     Appearance: He is not toxic-appearing.      Comments: Chronically-ill appearing male.    HENT:      Head: Normocephalic and atraumatic.      Nose: Nose normal. No congestion.      Mouth/Throat:      Mouth: Mucous membranes are dry.      Pharynx: Oropharynx is clear. No oropharyngeal exudate.   Eyes:      General:         Right eye: No discharge.         Left eye: No discharge.      Conjunctiva/sclera: Conjunctivae normal.   Neck:      Musculoskeletal: Normal range of motion and neck supple. No neck rigidity or muscular tenderness.   Cardiovascular:      Rate and Rhythm: Normal rate and regular rhythm.      Heart sounds: Normal heart sounds. No murmur.   Pulmonary:      Effort: Pulmonary effort is normal. No respiratory distress.      Breath sounds: Normal breath sounds. No wheezing or rales.   Abdominal:      General: There is no distension.      Palpations: Abdomen is soft.      Tenderness: There is no abdominal tenderness.   Musculoskeletal: Normal range of motion.         General: Tenderness, deformity and signs of injury present.      Comments: Eschar and ulcers are noted on the  Lateral side of the right foot  Left AKA.  S/P right AKA on 4/15.  Surgical dressing place.    Skin:     General: Skin is warm and dry.      Coloration: Skin is not jaundiced or pale.   Neurological:      Mental Status: He is alert and oriented to person, place, and time. Mental status is at baseline.      Motor: Weakness present.      Comments: Chronic left  hemiparesis and dysphagia secondary to history of CVA.    Psychiatric:         Mood and Affect: Mood normal.         Fluids    Intake/Output Summary (Last 24 hours) at 4/16/2020 0948  Last data filed at 4/16/2020 0710  Gross per 24 hour   Intake 550 ml   Output 275 ml   Net 275 ml       Laboratory  Recent Labs     04/14/20  0024 04/15/20  0030 04/16/20  0330   WBC 10.3 8.8 11.2*   RBC 4.27* 3.69* 3.70*   HEMOGLOBIN 11.0* 9.5* 9.6*   HEMATOCRIT 34.1* 29.2* 29.4*   MCV 79.9* 79.1* 79.5*   MCH 25.8* 25.7* 25.9*   MCHC 32.3* 32.5* 32.7*   RDW 42.1 39.9 40.9   PLATELETCT 306 267 269   MPV 9.6 9.7 9.4     Recent Labs     04/14/20  0024 04/15/20  0030 04/16/20  0330   SODIUM 126* 126* 125*   POTASSIUM 4.5 4.1 4.3   CHLORIDE 93* 91* 89*   CO2 20 23 18*   GLUCOSE 182* 168* 252*   BUN 10 10 9   CREATININE 0.84 0.72 0.68   CALCIUM 9.1 9.1 9.0                   Imaging       Assessment/Plan  * Diabetic foot ulcer (HCC)- (present on admission)  Assessment & Plan  Multiple wounds of RLE  Not healing despite oral antibiotics and wound VAC  Wound culture with pseudomonas and enterococcus   Vascular surgery following  S/P RLE angiogram on 4/13 showing aortoiliac occlusive disease  S/P right AKA on 4/15  Pathology findings consistent with OM  Continue zosyn until first dressing change  Ortho surgery following   Continue pain control IV and oral narcotics.  Start scheduled neurontin.  PT/OT to follow.     PAD (peripheral artery disease) (HCC)- (present on admission)  Assessment & Plan  S/P RLE angiogram showing PAD and aortoiliac occlusive disease  Vascular surgery and ortho following.   Right AKA today.   Continue lipitor, aggrenox, and plavix.     Dysphagia as late effect of stroke- (present on admission)  Assessment & Plan  History of  SLP following  Continue modified diet.     Hyponatremia- (present on admission)  Assessment & Plan  Appears chronic on review of records  High urine sodium with low serum  osmo  ?SIADH  Asymptomatic  Continue fluid restriction  Recheck BMP    History of stroke- (present on admission)  Assessment & Plan  With residual left-sided weakness and dysphasia  Aggrenox, plavix, and lipitor.     HTN (hypertension)- (present on admission)  Assessment & Plan  Will monitor  continue on Norvasc, hydralazine, Imdur, atenolol, losartan    DM (diabetes mellitus) (HCC)- (present on admission)  Assessment & Plan  hema A1c is 7.1  Glucose elevated this am  Start SSI.   Continue on NPH  Attempt to maintain BG < 150 to promote healing.   accu checks are noted  Diabetic diet    Iron deficiency anemia- (present on admission)  Assessment & Plan  No evidence of active bleed  Iron 28  Continue ferrous sulfate and ascorbic acid.   Monitor hgb.        VTE prophylaxis: scds.  Anticoagulation held for surgery.

## 2020-04-16 NOTE — CARE PLAN
Problem: Discharge Barriers/Planning  Goal: Patient's continuum of care needs will be met  Outcome: PROGRESSING SLOWER THAN EXPECTED     Problem: Skin Integrity  Goal: Risk for impaired skin integrity will decrease  Outcome: PROGRESSING SLOWER THAN EXPECTED     Problem: Mobility  Goal: Risk for activity intolerance will decrease  Outcome: PROGRESSING SLOWER THAN EXPECTED

## 2020-04-17 PROBLEM — Z89.611 S/P AKA (ABOVE KNEE AMPUTATION), RIGHT (HCC): Status: ACTIVE | Noted: 2020-04-17

## 2020-04-17 LAB
ANION GAP SERPL CALC-SCNC: 13 MMOL/L (ref 7–16)
BASOPHILS # BLD AUTO: 0.3 % (ref 0–1.8)
BASOPHILS # BLD AUTO: 0.4 % (ref 0–1.8)
BASOPHILS # BLD AUTO: 0.6 % (ref 0–1.8)
BASOPHILS # BLD: 0.04 K/UL (ref 0–0.12)
BASOPHILS # BLD: 0.05 K/UL (ref 0–0.12)
BASOPHILS # BLD: 0.07 K/UL (ref 0–0.12)
BUN SERPL-MCNC: 18 MG/DL (ref 8–22)
CALCIUM SERPL-MCNC: 8.9 MG/DL (ref 8.5–10.5)
CHLORIDE SERPL-SCNC: 95 MMOL/L (ref 96–112)
CO2 SERPL-SCNC: 20 MMOL/L (ref 20–33)
CREAT SERPL-MCNC: 0.72 MG/DL (ref 0.5–1.4)
EOSINOPHIL # BLD AUTO: 0.14 K/UL (ref 0–0.51)
EOSINOPHIL # BLD AUTO: 0.15 K/UL (ref 0–0.51)
EOSINOPHIL # BLD AUTO: 0.18 K/UL (ref 0–0.51)
EOSINOPHIL NFR BLD: 1.2 % (ref 0–6.9)
EOSINOPHIL NFR BLD: 1.2 % (ref 0–6.9)
EOSINOPHIL NFR BLD: 1.3 % (ref 0–6.9)
ERYTHROCYTE [DISTWIDTH] IN BLOOD BY AUTOMATED COUNT: 41.6 FL (ref 35.9–50)
ERYTHROCYTE [DISTWIDTH] IN BLOOD BY AUTOMATED COUNT: 41.9 FL (ref 35.9–50)
ERYTHROCYTE [DISTWIDTH] IN BLOOD BY AUTOMATED COUNT: 42.9 FL (ref 35.9–50)
GLUCOSE BLD-MCNC: 182 MG/DL (ref 65–99)
GLUCOSE BLD-MCNC: 235 MG/DL (ref 65–99)
GLUCOSE BLD-MCNC: 249 MG/DL (ref 65–99)
GLUCOSE BLD-MCNC: 273 MG/DL (ref 65–99)
GLUCOSE BLD-MCNC: 277 MG/DL (ref 65–99)
GLUCOSE SERPL-MCNC: 227 MG/DL (ref 65–99)
HCT VFR BLD AUTO: 22.6 % (ref 42–52)
HCT VFR BLD AUTO: 23.8 % (ref 42–52)
HCT VFR BLD AUTO: 25.5 % (ref 42–52)
HGB BLD-MCNC: 7.4 G/DL (ref 14–18)
HGB BLD-MCNC: 7.8 G/DL (ref 14–18)
HGB BLD-MCNC: 8.3 G/DL (ref 14–18)
IMM GRANULOCYTES # BLD AUTO: 0.06 K/UL (ref 0–0.11)
IMM GRANULOCYTES # BLD AUTO: 0.08 K/UL (ref 0–0.11)
IMM GRANULOCYTES # BLD AUTO: 0.09 K/UL (ref 0–0.11)
IMM GRANULOCYTES NFR BLD AUTO: 0.5 % (ref 0–0.9)
IMM GRANULOCYTES NFR BLD AUTO: 0.6 % (ref 0–0.9)
IMM GRANULOCYTES NFR BLD AUTO: 0.7 % (ref 0–0.9)
LYMPHOCYTES # BLD AUTO: 2.13 K/UL (ref 1–4.8)
LYMPHOCYTES # BLD AUTO: 2.34 K/UL (ref 1–4.8)
LYMPHOCYTES # BLD AUTO: 2.45 K/UL (ref 1–4.8)
LYMPHOCYTES NFR BLD: 15.5 % (ref 22–41)
LYMPHOCYTES NFR BLD: 18.8 % (ref 22–41)
LYMPHOCYTES NFR BLD: 21.2 % (ref 22–41)
MCH RBC QN AUTO: 25.8 PG (ref 27–33)
MCH RBC QN AUTO: 26.1 PG (ref 27–33)
MCH RBC QN AUTO: 26.1 PG (ref 27–33)
MCHC RBC AUTO-ENTMCNC: 32.5 G/DL (ref 33.7–35.3)
MCHC RBC AUTO-ENTMCNC: 32.7 G/DL (ref 33.7–35.3)
MCHC RBC AUTO-ENTMCNC: 32.8 G/DL (ref 33.7–35.3)
MCV RBC AUTO: 79.2 FL (ref 81.4–97.8)
MCV RBC AUTO: 79.6 FL (ref 81.4–97.8)
MCV RBC AUTO: 79.9 FL (ref 81.4–97.8)
MONOCYTES # BLD AUTO: 1.03 K/UL (ref 0–0.85)
MONOCYTES # BLD AUTO: 1.15 K/UL (ref 0–0.85)
MONOCYTES # BLD AUTO: 1.22 K/UL (ref 0–0.85)
MONOCYTES NFR BLD AUTO: 8.4 % (ref 0–13.4)
MONOCYTES NFR BLD AUTO: 8.9 % (ref 0–13.4)
MONOCYTES NFR BLD AUTO: 9.8 % (ref 0–13.4)
NEUTROPHILS # BLD AUTO: 10.13 K/UL (ref 1.82–7.42)
NEUTROPHILS # BLD AUTO: 7.84 K/UL (ref 1.82–7.42)
NEUTROPHILS # BLD AUTO: 8.56 K/UL (ref 1.82–7.42)
NEUTROPHILS NFR BLD: 67.9 % (ref 44–72)
NEUTROPHILS NFR BLD: 68.9 % (ref 44–72)
NEUTROPHILS NFR BLD: 73.8 % (ref 44–72)
NRBC # BLD AUTO: 0 K/UL
NRBC BLD-RTO: 0 /100 WBC
PLATELET # BLD AUTO: 262 K/UL (ref 164–446)
PLATELET # BLD AUTO: 289 K/UL (ref 164–446)
PLATELET # BLD AUTO: 306 K/UL (ref 164–446)
PMV BLD AUTO: 9.6 FL (ref 9–12.9)
PMV BLD AUTO: 9.7 FL (ref 9–12.9)
PMV BLD AUTO: 9.8 FL (ref 9–12.9)
POTASSIUM SERPL-SCNC: 4.2 MMOL/L (ref 3.6–5.5)
RBC # BLD AUTO: 2.83 M/UL (ref 4.7–6.1)
RBC # BLD AUTO: 2.99 M/UL (ref 4.7–6.1)
RBC # BLD AUTO: 3.22 M/UL (ref 4.7–6.1)
SODIUM SERPL-SCNC: 128 MMOL/L (ref 135–145)
WBC # BLD AUTO: 11.6 K/UL (ref 4.8–10.8)
WBC # BLD AUTO: 12.4 K/UL (ref 4.8–10.8)
WBC # BLD AUTO: 13.7 K/UL (ref 4.8–10.8)

## 2020-04-17 PROCEDURE — A9270 NON-COVERED ITEM OR SERVICE: HCPCS | Performed by: PHYSICIAN ASSISTANT

## 2020-04-17 PROCEDURE — A9270 NON-COVERED ITEM OR SERVICE: HCPCS | Performed by: FAMILY MEDICINE

## 2020-04-17 PROCEDURE — 770001 HCHG ROOM/CARE - MED/SURG/GYN PRIV*

## 2020-04-17 PROCEDURE — 700102 HCHG RX REV CODE 250 W/ 637 OVERRIDE(OP): Performed by: INTERNAL MEDICINE

## 2020-04-17 PROCEDURE — 99233 SBSQ HOSP IP/OBS HIGH 50: CPT | Performed by: INTERNAL MEDICINE

## 2020-04-17 PROCEDURE — 700111 HCHG RX REV CODE 636 W/ 250 OVERRIDE (IP): Performed by: NURSE PRACTITIONER

## 2020-04-17 PROCEDURE — A9270 NON-COVERED ITEM OR SERVICE: HCPCS | Performed by: INTERNAL MEDICINE

## 2020-04-17 PROCEDURE — A9270 NON-COVERED ITEM OR SERVICE: HCPCS | Performed by: NURSE PRACTITIONER

## 2020-04-17 PROCEDURE — 700102 HCHG RX REV CODE 250 W/ 637 OVERRIDE(OP): Performed by: NURSE PRACTITIONER

## 2020-04-17 PROCEDURE — 82962 GLUCOSE BLOOD TEST: CPT | Mod: 91

## 2020-04-17 PROCEDURE — 36415 COLL VENOUS BLD VENIPUNCTURE: CPT

## 2020-04-17 PROCEDURE — A9270 NON-COVERED ITEM OR SERVICE: HCPCS | Performed by: SURGERY

## 2020-04-17 PROCEDURE — 85025 COMPLETE CBC W/AUTO DIFF WBC: CPT

## 2020-04-17 PROCEDURE — 700102 HCHG RX REV CODE 250 W/ 637 OVERRIDE(OP): Performed by: PHYSICIAN ASSISTANT

## 2020-04-17 PROCEDURE — 700102 HCHG RX REV CODE 250 W/ 637 OVERRIDE(OP): Performed by: SURGERY

## 2020-04-17 PROCEDURE — 80048 BASIC METABOLIC PNL TOTAL CA: CPT

## 2020-04-17 PROCEDURE — 99232 SBSQ HOSP IP/OBS MODERATE 35: CPT | Performed by: NURSE PRACTITIONER

## 2020-04-17 PROCEDURE — 700102 HCHG RX REV CODE 250 W/ 637 OVERRIDE(OP): Performed by: FAMILY MEDICINE

## 2020-04-17 PROCEDURE — 700105 HCHG RX REV CODE 258: Performed by: NURSE PRACTITIONER

## 2020-04-17 RX ORDER — DEXTROSE MONOHYDRATE 25 G/50ML
50 INJECTION, SOLUTION INTRAVENOUS
Status: DISCONTINUED | OUTPATIENT
Start: 2020-04-17 | End: 2020-04-17

## 2020-04-17 RX ORDER — INSULIN GLARGINE 100 [IU]/ML
25 INJECTION, SOLUTION SUBCUTANEOUS EVERY EVENING
Status: DISCONTINUED | OUTPATIENT
Start: 2020-04-17 | End: 2020-04-18

## 2020-04-17 RX ORDER — DEXTROSE MONOHYDRATE 25 G/50ML
50 INJECTION, SOLUTION INTRAVENOUS
Status: DISCONTINUED | OUTPATIENT
Start: 2020-04-17 | End: 2020-04-19

## 2020-04-17 RX ADMIN — Medication 500 MG: at 06:16

## 2020-04-17 RX ADMIN — OMEPRAZOLE 20 MG: 20 CAPSULE, DELAYED RELEASE ORAL at 06:09

## 2020-04-17 RX ADMIN — PREGABALIN 75 MG: 75 CAPSULE ORAL at 06:12

## 2020-04-17 RX ADMIN — INSULIN HUMAN 14 UNITS: 100 INJECTION, SUSPENSION SUBCUTANEOUS at 06:27

## 2020-04-17 RX ADMIN — AMITRIPTYLINE HYDROCHLORIDE 50 MG: 25 TABLET, FILM COATED ORAL at 21:08

## 2020-04-17 RX ADMIN — SPIRONOLACTONE 50 MG: 50 TABLET ORAL at 18:00

## 2020-04-17 RX ADMIN — AMLODIPINE BESYLATE 10 MG: 10 TABLET ORAL at 06:13

## 2020-04-17 RX ADMIN — INSULIN HUMAN 5 UNITS: 100 INJECTION, SOLUTION PARENTERAL at 09:12

## 2020-04-17 RX ADMIN — PIPERACILLIN AND TAZOBACTAM 4.5 G: 4; .5 INJECTION, POWDER, LYOPHILIZED, FOR SOLUTION INTRAVENOUS; PARENTERAL at 21:14

## 2020-04-17 RX ADMIN — HYDRALAZINE HYDROCHLORIDE 100 MG: 25 TABLET, FILM COATED ORAL at 06:08

## 2020-04-17 RX ADMIN — PIPERACILLIN AND TAZOBACTAM 4.5 G: 4; .5 INJECTION, POWDER, LYOPHILIZED, FOR SOLUTION INTRAVENOUS; PARENTERAL at 13:30

## 2020-04-17 RX ADMIN — OXYCODONE HYDROCHLORIDE 15 MG: 10 TABLET ORAL at 06:49

## 2020-04-17 RX ADMIN — OXYCODONE HYDROCHLORIDE 15 MG: 10 TABLET ORAL at 16:45

## 2020-04-17 RX ADMIN — ATENOLOL 100 MG: 50 TABLET ORAL at 06:09

## 2020-04-17 RX ADMIN — ISOSORBIDE MONONITRATE 30 MG: 30 TABLET, EXTENDED RELEASE ORAL at 18:30

## 2020-04-17 RX ADMIN — LOSARTAN POTASSIUM 50 MG: 50 TABLET, FILM COATED ORAL at 06:15

## 2020-04-17 RX ADMIN — ASPIRIN AND DIPYRIDAMOLE 1 CAPSULE: 25; 200 CAPSULE, EXTENDED RELEASE ORAL at 18:33

## 2020-04-17 RX ADMIN — FERROUS SULFATE TAB 325 MG (65 MG ELEMENTAL FE) 325 MG: 325 (65 FE) TAB at 06:12

## 2020-04-17 RX ADMIN — PREGABALIN 75 MG: 75 CAPSULE ORAL at 18:30

## 2020-04-17 RX ADMIN — ISOSORBIDE MONONITRATE 30 MG: 30 TABLET, EXTENDED RELEASE ORAL at 06:15

## 2020-04-17 RX ADMIN — PREGABALIN 75 MG: 75 CAPSULE ORAL at 13:30

## 2020-04-17 RX ADMIN — SPIRONOLACTONE 50 MG: 50 TABLET ORAL at 06:13

## 2020-04-17 RX ADMIN — ATORVASTATIN CALCIUM 40 MG: 40 TABLET, FILM COATED ORAL at 21:08

## 2020-04-17 RX ADMIN — HYDRALAZINE HYDROCHLORIDE 100 MG: 25 TABLET, FILM COATED ORAL at 18:30

## 2020-04-17 RX ADMIN — PIPERACILLIN AND TAZOBACTAM 4.5 G: 4; .5 INJECTION, POWDER, LYOPHILIZED, FOR SOLUTION INTRAVENOUS; PARENTERAL at 06:22

## 2020-04-17 RX ADMIN — SENNOSIDES AND DOCUSATE SODIUM 2 TABLET: 8.6; 5 TABLET ORAL at 18:30

## 2020-04-17 RX ADMIN — OXYCODONE HYDROCHLORIDE 15 MG: 10 TABLET ORAL at 02:49

## 2020-04-17 RX ADMIN — ASPIRIN AND DIPYRIDAMOLE 1 CAPSULE: 25; 200 CAPSULE, EXTENDED RELEASE ORAL at 06:13

## 2020-04-17 RX ADMIN — NYSTATIN: 100000 POWDER TOPICAL at 18:40

## 2020-04-17 RX ADMIN — INSULIN GLARGINE 25 UNITS: 100 INJECTION, SOLUTION SUBCUTANEOUS at 18:33

## 2020-04-17 RX ADMIN — CLOPIDOGREL BISULFATE 75 MG: 75 TABLET ORAL at 06:15

## 2020-04-17 RX ADMIN — NYSTATIN: 100000 POWDER TOPICAL at 06:16

## 2020-04-17 ASSESSMENT — ENCOUNTER SYMPTOMS
PALPITATIONS: 0
DIARRHEA: 0
EYE DISCHARGE: 0
HEADACHES: 0
MYALGIAS: 1
SORE THROAT: 0
TINGLING: 0
VOMITING: 0
DOUBLE VISION: 0
FEVER: 0
DIZZINESS: 0
NAUSEA: 0
BLURRED VISION: 0
ABDOMINAL PAIN: 0
SHORTNESS OF BREATH: 0
FOCAL WEAKNESS: 1

## 2020-04-17 NOTE — CARE PLAN
Problem: Communication  Goal: The ability to communicate needs accurately and effectively will improve  Outcome: PROGRESSING AS EXPECTED    Pt updated on POC and all questions answered at this time. Hourly rounding in place.      Problem: Safety  Goal: Will remain free from injury    Proper fall precautions in place. Call light within reach and encouraged to use. Hourly rounding in practice.

## 2020-04-17 NOTE — PROGRESS NOTES
Hospital Medicine Daily Progress Note    Date of Service  4/17/2020    Chief Complaint  64 y.o. male admitted 4/9/2020 with Leg wound       Hospital Course    64 y.o. male who presented 4/9/2020 with stroke, DM, HTN who presented from correctional facility with right diabetic foot ulcers that have been not healing despite antibiotics and wound Vac.  His documents show that his wound culture was growing Pseudomonas and enterococcus and he was treated with oral antibiotics.  On admission Dr. Lebron was consulted.  He underwent RLE angiogram on 4/13 showing aortoiliac occlusive disease.  He is pending AKA vs BKA.  Ortho has been consulted.  Continue IV antibiotics and tight glycemic control.    Interval Problem Update  4/14:  Pending AKA vs BKA tomorrow.  This is to be determined per patient and surgery team.  Sodium noted be 126.  He does have a history of hyponatremia which appears to be chronic.  Workup ordered.  He does have pain to RLE.  Denies shortness of breath, nausea, or vomiting.  VSS.  Afebrile.  4/15:  S/P AKA.  Discontinue antibiotics.  Does endorse pain to surgical site.  Surgical dressing in place.  Sodium 126.  Hold IVF and monitor.     4/16:  POD #1.  Pain uncontrolled.  Medications adjusted.  Bleeding from surgical site overnight, although resolved with reinforcement.  Sodium 125.  Continue fluid restriction.  Repeat BMP.  Afebrile.  VSS.   4/17:  Hgb 7.4 this am.  No evidence of active bleed.  Suspect aspect of blood loss anemia from recent surgery.  Hemodynamically stable.  Trend hgb.  More somnolent this am.  Will de-escalate pain meds if this persists.  Awaiting first dressing change per ortho surgery.     Consultants/Specialty  Vascular surgery  Ortho surgery.     Code Status  full    Disposition  Anticipate transfer back to [facility] with jose r lift when medically clear.      Review of Systems  Review of Systems   Constitutional: Positive for malaise/fatigue. Negative for fever.   HENT: Negative  for congestion and sore throat.    Eyes: Negative for blurred vision, double vision and discharge.   Respiratory: Negative for shortness of breath.    Cardiovascular: Negative for chest pain, palpitations and leg swelling.   Gastrointestinal: Negative for abdominal pain, diarrhea, nausea and vomiting.   Genitourinary: Negative for dysuria and hematuria.   Musculoskeletal: Positive for joint pain and myalgias.   Skin: Negative for rash.   Neurological: Positive for focal weakness (chronic left sided weakness secondary to h/o cva. ). Negative for dizziness, tingling and headaches.        Physical Exam  Temp:  [36.1 °C (97 °F)-36.9 °C (98.5 °F)] 36.3 °C (97.4 °F)  Pulse:  [67-75] 70  Resp:  [16-18] 16  BP: (103-158)/(56-74) 122/56  SpO2:  [93 %-97 %] 96 %    Physical Exam  Constitutional:       General: He is not in acute distress.     Appearance: He is ill-appearing.      Comments: Chronically-ill appearing male.    HENT:      Head: Normocephalic and atraumatic.      Nose: Nose normal.      Mouth/Throat:      Mouth: Mucous membranes are dry.      Pharynx: Oropharynx is clear.   Eyes:      General: No scleral icterus.     Conjunctiva/sclera: Conjunctivae normal.   Neck:      Musculoskeletal: Normal range of motion and neck supple. No neck rigidity.   Cardiovascular:      Rate and Rhythm: Normal rate and regular rhythm.      Heart sounds: Normal heart sounds.   Pulmonary:      Effort: Pulmonary effort is normal. No respiratory distress.      Breath sounds: Normal breath sounds. No wheezing.   Abdominal:      General: There is no distension.      Palpations: Abdomen is soft.      Tenderness: There is no abdominal tenderness. There is no guarding.   Musculoskeletal: Normal range of motion.         General: Tenderness, deformity and signs of injury present.      Comments: Eschar and ulcers are noted on the  Lateral side of the right foot  Left AKA.  S/P right AKA on 4/15.  Surgical dressing place.    Skin:     General:  Skin is warm and dry.      Coloration: Skin is not jaundiced.      Findings: No bruising.   Neurological:      Mental Status: He is alert and oriented to person, place, and time. Mental status is at baseline.      Comments: Chronic left hemiparesis and dysphagia secondary to history of CVA.    Psychiatric:         Mood and Affect: Mood normal.         Fluids    Intake/Output Summary (Last 24 hours) at 4/17/2020 1435  Last data filed at 4/17/2020 0951  Gross per 24 hour   Intake 1545 ml   Output 50 ml   Net 1495 ml       Laboratory  Recent Labs     04/16/20  0330 04/17/20  0059 04/17/20  1215   WBC 11.2* 12.4* 11.6*   RBC 3.70* 2.99* 2.83*   HEMOGLOBIN 9.6* 7.8* 7.4*   HEMATOCRIT 29.4* 23.8* 22.6*   MCV 79.5* 79.6* 79.9*   MCH 25.9* 26.1* 26.1*   MCHC 32.7* 32.8* 32.7*   RDW 40.9 41.6 42.9   PLATELETCT 269 289 262   MPV 9.4 9.8 9.6     Recent Labs     04/16/20  0330 04/16/20  1223 04/17/20  0059   SODIUM 125* 124* 128*   POTASSIUM 4.3 4.5 4.2   CHLORIDE 89* 89* 95*   CO2 18* 19* 20   GLUCOSE 252* 324* 227*   BUN 9 12 18   CREATININE 0.68 0.69 0.72   CALCIUM 9.0 8.9 8.9                   Imaging       Assessment/Plan  * Diabetic foot ulcer (HCC)- (present on admission)  Assessment & Plan  Multiple wounds of RLE  Not healing despite oral antibiotics and wound VAC  Wound culture with pseudomonas and enterococcus   Vascular surgery following  S/P RLE angiogram on 4/13 showing aortoiliac occlusive disease  S/P right AKA on 4/15  Pathology findings consistent with OM  Continue zosyn until first dressing change  Ortho surgery following   Continue pain control IV and oral narcotics.    Continue neurontin.  PT/OT followin    S/P AKA (above knee amputation), right ([facility])  Assessment & Plan  Secondary to OM  PT/OT following  Will need jose r lift at discharge.   Continue pain control     PAD (peripheral artery disease) ([facility])- (present on admission)  Assessment & Plan  S/P RLE angiogram showing PAD and aortoiliac occlusive  disease  Vascular surgery and ortho following.   Right AKA 4/15.   Continue lipitor, aggrenox, and plavix.     Dysphagia as late effect of stroke- (present on admission)  Assessment & Plan  History of  SLP following  Continue modified diet.     Hyponatremia- (present on admission)  Assessment & Plan  Appears chronic on review of records  High urine sodium with low serum osmo  ?SIADH  Asymptomatic  Improved this am  Follow bmp.     History of stroke- (present on admission)  Assessment & Plan  With residual left-sided weakness and dysphasia  Aggrenox, plavix, and lipitor.     HTN (hypertension)- (present on admission)  Assessment & Plan  Will monitor  continue on Norvasc, hydralazine, Imdur, atenolol, losartan    DM (diabetes mellitus) (HCC)- (present on admission)  Assessment & Plan  hema A1c is 7.1  Glucose poorly controlled.  Transition to lantus 25 units every evening.   SSI  Attempt to maintain BG < 150 to promote healing.   accu checks are noted  Diabetic diet    Iron deficiency anemia- (present on admission)  Assessment & Plan  No evidence of active bleed  Iron 28  Continue ferrous sulfate and ascorbic acid.   Hgb 7.4 today.  Aspect of blood loss anemia secondary to surgery  Continue to follow cbc  Transfuse for hgb < 7.        VTE prophylaxis: scds.  Anticoagulation held secondary to anemia.

## 2020-04-17 NOTE — PROGRESS NOTES
2 RN skin check complete.    Left AKA stump is healed w/ new mepilex in place   Skin is CDI      Right AKA stump dressing re-wrapped by PT; surgical dressing, reinforcement, and ace wrap present; slightly saturated underneath w/ old, dried blood     Sacrum is red/excoriated w/ small skin tear present    Cleaned w/ soap and water w/ new mepilex placed    Q2 turns being completed     New bilateral elbow mepilex in place   Skin in CDI     Waffle mattress in use    SpO2 finger, PIV in place     Skin under the pannus, scrotum, and between thighs assessed; red and excoriated w/ small skin tears   Skin cleansed w/ soap + water    Condom catheter in use for incontinence   Wound care is following

## 2020-04-17 NOTE — PROGRESS NOTES
Pt is A+Ox4   Complains of 5/10 pain; medicated per MAR     Condom cath in place for urinary incontinence   Bilateral elbow mepilex + sacral mepilex changed per policy   Sacrum is excoriated/weeping; cleansed w/ soap + water   Waffle cushion in use     Tolerating a level 5 minced 7 moist diet w/ level 2 mildly thick liquids   Denies N/V/D     Bilateral AKA   Right dressing is CDI; re-wrapped today and reinforced w/ ace wrap by PT   Left stump has a new mepilex     Call light within reach, bed locked and in the lowest position, bed alarm on, all needs met at this time     Hourly rounding in place w/ Q2 turns    2 guards at bedside

## 2020-04-17 NOTE — DISCHARGE PLANNING
Anticipated Discharge Disposition: halfway    Action: This case was discussed today during IDT Rounds.  Per MD, this patient might be medically clear Monday to return to long term, the patient will need a Lisa Lift.      LSW spoke with GIACOMO Katz at [facility] [phone].  Per Gianna, [facility] will be able to provide the Lisa Lift.    Barriers to Discharge: None.    Plan: Medical Clearance.

## 2020-04-17 NOTE — PROGRESS NOTES
I have personally seen and examined / evaluated the patient, discussed the patient's evaluation & management plan with SARAH Gillette and I have reviewed the note below.     I agree with the findings, history, examination and assessment / plan as listed below with changes/addendum as listed below by me in my addendum / attestation separetely.     Patient with underlying history of diabetes mellitus, hypertension, history of CVA, history of left AKA who was admitted from the correctional facility for evaluation of right diabetic foot ulcer.  Initiated on empiric antibiotic therapy, consultation from limb preservation service, vascular surgery obtained.  Patient underwent angiographic evaluation by vascular surgery on April 13, 2020.  Findings of aortoiliac occlusive disease.  Peripheral arterial disease.  LPS involved orthopedics team for evaluation of BKA versus AKA.  Dr. Hairston took the patient to the OR on April 15, 2020 and patient underwent a right-sided above-knee amputation.     At this time postoperative care being continued by surgical team.     Patient needs ongoing pain control including the use of IV morphine, monitor for any adverse effects related to the use of IV morphine, high risk medication.  Somnolent this morning, discussed with Terry, considering weaning of narcotics.     For now continue IV antibiotic therapy, de-escalate post first dressing change.  Discussed with Terry.      Continue antiplatelet therapy, risk factor modifications including management of hypertension, dyslipidemia and diabetes mellitus.       Monitor sodium levels,?  SIADH/others.  Sodium levels improved after gentle fluid hydration.  Monitor at this time.    Improve glycemic control, transition to Lantus 25 units from NPH and sliding scale insulin 3.  Titrate to achieve normal glycemic control.    Leobardo Honeycutt M.D.  04/17/20  12:15 PM

## 2020-04-18 LAB
ANION GAP SERPL CALC-SCNC: 12 MMOL/L (ref 7–16)
BUN SERPL-MCNC: 11 MG/DL (ref 8–22)
CALCIUM SERPL-MCNC: 8.9 MG/DL (ref 8.5–10.5)
CHLORIDE SERPL-SCNC: 90 MMOL/L (ref 96–112)
CO2 SERPL-SCNC: 22 MMOL/L (ref 20–33)
CREAT SERPL-MCNC: 0.6 MG/DL (ref 0.5–1.4)
ERYTHROCYTE [DISTWIDTH] IN BLOOD BY AUTOMATED COUNT: 41.2 FL (ref 35.9–50)
GLUCOSE BLD-MCNC: 169 MG/DL (ref 65–99)
GLUCOSE BLD-MCNC: 219 MG/DL (ref 65–99)
GLUCOSE BLD-MCNC: 229 MG/DL (ref 65–99)
GLUCOSE BLD-MCNC: 245 MG/DL (ref 65–99)
GLUCOSE SERPL-MCNC: 256 MG/DL (ref 65–99)
HCT VFR BLD AUTO: 23.4 % (ref 42–52)
HGB BLD-MCNC: 7.8 G/DL (ref 14–18)
MCH RBC QN AUTO: 26.2 PG (ref 27–33)
MCHC RBC AUTO-ENTMCNC: 33.3 G/DL (ref 33.7–35.3)
MCV RBC AUTO: 78.5 FL (ref 81.4–97.8)
PLATELET # BLD AUTO: 309 K/UL (ref 164–446)
PMV BLD AUTO: 9.6 FL (ref 9–12.9)
POTASSIUM SERPL-SCNC: 4 MMOL/L (ref 3.6–5.5)
RBC # BLD AUTO: 2.98 M/UL (ref 4.7–6.1)
SODIUM SERPL-SCNC: 124 MMOL/L (ref 135–145)
WBC # BLD AUTO: 12.5 K/UL (ref 4.8–10.8)

## 2020-04-18 PROCEDURE — 700102 HCHG RX REV CODE 250 W/ 637 OVERRIDE(OP): Performed by: INTERNAL MEDICINE

## 2020-04-18 PROCEDURE — 700102 HCHG RX REV CODE 250 W/ 637 OVERRIDE(OP): Performed by: SURGERY

## 2020-04-18 PROCEDURE — 99232 SBSQ HOSP IP/OBS MODERATE 35: CPT | Performed by: NURSE PRACTITIONER

## 2020-04-18 PROCEDURE — 36415 COLL VENOUS BLD VENIPUNCTURE: CPT

## 2020-04-18 PROCEDURE — 700111 HCHG RX REV CODE 636 W/ 250 OVERRIDE (IP): Performed by: NURSE PRACTITIONER

## 2020-04-18 PROCEDURE — 700105 HCHG RX REV CODE 258: Performed by: NURSE PRACTITIONER

## 2020-04-18 PROCEDURE — 700105 HCHG RX REV CODE 258: Performed by: INTERNAL MEDICINE

## 2020-04-18 PROCEDURE — 700102 HCHG RX REV CODE 250 W/ 637 OVERRIDE(OP): Performed by: PHYSICIAN ASSISTANT

## 2020-04-18 PROCEDURE — 80048 BASIC METABOLIC PNL TOTAL CA: CPT

## 2020-04-18 PROCEDURE — A9270 NON-COVERED ITEM OR SERVICE: HCPCS | Performed by: PHYSICIAN ASSISTANT

## 2020-04-18 PROCEDURE — 770001 HCHG ROOM/CARE - MED/SURG/GYN PRIV*

## 2020-04-18 PROCEDURE — 700102 HCHG RX REV CODE 250 W/ 637 OVERRIDE(OP): Performed by: FAMILY MEDICINE

## 2020-04-18 PROCEDURE — A9270 NON-COVERED ITEM OR SERVICE: HCPCS | Performed by: NURSE PRACTITIONER

## 2020-04-18 PROCEDURE — A9270 NON-COVERED ITEM OR SERVICE: HCPCS | Performed by: SURGERY

## 2020-04-18 PROCEDURE — A9270 NON-COVERED ITEM OR SERVICE: HCPCS | Performed by: INTERNAL MEDICINE

## 2020-04-18 PROCEDURE — 85027 COMPLETE CBC AUTOMATED: CPT

## 2020-04-18 PROCEDURE — 700102 HCHG RX REV CODE 250 W/ 637 OVERRIDE(OP): Performed by: NURSE PRACTITIONER

## 2020-04-18 PROCEDURE — 82962 GLUCOSE BLOOD TEST: CPT

## 2020-04-18 PROCEDURE — A9270 NON-COVERED ITEM OR SERVICE: HCPCS | Performed by: FAMILY MEDICINE

## 2020-04-18 PROCEDURE — 99233 SBSQ HOSP IP/OBS HIGH 50: CPT | Performed by: INTERNAL MEDICINE

## 2020-04-18 RX ORDER — SODIUM CHLORIDE 9 MG/ML
INJECTION, SOLUTION INTRAVENOUS CONTINUOUS
Status: DISPENSED | OUTPATIENT
Start: 2020-04-18 | End: 2020-04-18

## 2020-04-18 RX ORDER — INSULIN GLARGINE 100 [IU]/ML
35 INJECTION, SOLUTION SUBCUTANEOUS EVERY EVENING
Status: DISCONTINUED | OUTPATIENT
Start: 2020-04-18 | End: 2020-04-19

## 2020-04-18 RX ADMIN — ISOSORBIDE MONONITRATE 30 MG: 30 TABLET, EXTENDED RELEASE ORAL at 05:40

## 2020-04-18 RX ADMIN — HYDRALAZINE HYDROCHLORIDE 100 MG: 25 TABLET, FILM COATED ORAL at 17:59

## 2020-04-18 RX ADMIN — ACETAMINOPHEN 650 MG: 325 TABLET, FILM COATED ORAL at 17:59

## 2020-04-18 RX ADMIN — ACETAMINOPHEN 650 MG: 325 TABLET, FILM COATED ORAL at 09:08

## 2020-04-18 RX ADMIN — PREGABALIN 75 MG: 75 CAPSULE ORAL at 17:59

## 2020-04-18 RX ADMIN — SENNOSIDES AND DOCUSATE SODIUM 2 TABLET: 8.6; 5 TABLET ORAL at 05:39

## 2020-04-18 RX ADMIN — PIPERACILLIN AND TAZOBACTAM 4.5 G: 4; .5 INJECTION, POWDER, LYOPHILIZED, FOR SOLUTION INTRAVENOUS; PARENTERAL at 05:40

## 2020-04-18 RX ADMIN — PREGABALIN 75 MG: 75 CAPSULE ORAL at 05:38

## 2020-04-18 RX ADMIN — PREGABALIN 75 MG: 75 CAPSULE ORAL at 13:08

## 2020-04-18 RX ADMIN — ASPIRIN AND DIPYRIDAMOLE 1 CAPSULE: 25; 200 CAPSULE, EXTENDED RELEASE ORAL at 05:41

## 2020-04-18 RX ADMIN — AMITRIPTYLINE HYDROCHLORIDE 50 MG: 25 TABLET, FILM COATED ORAL at 22:09

## 2020-04-18 RX ADMIN — MORPHINE SULFATE 2 MG: 4 INJECTION INTRAVENOUS at 23:22

## 2020-04-18 RX ADMIN — OXYCODONE HYDROCHLORIDE 15 MG: 10 TABLET ORAL at 19:40

## 2020-04-18 RX ADMIN — INSULIN GLARGINE 35 UNITS: 100 INJECTION, SOLUTION SUBCUTANEOUS at 17:55

## 2020-04-18 RX ADMIN — SENNOSIDES AND DOCUSATE SODIUM 2 TABLET: 8.6; 5 TABLET ORAL at 17:58

## 2020-04-18 RX ADMIN — FERROUS SULFATE TAB 325 MG (65 MG ELEMENTAL FE) 325 MG: 325 (65 FE) TAB at 09:00

## 2020-04-18 RX ADMIN — NYSTATIN: 100000 POWDER TOPICAL at 05:40

## 2020-04-18 RX ADMIN — ATORVASTATIN CALCIUM 40 MG: 40 TABLET, FILM COATED ORAL at 22:09

## 2020-04-18 RX ADMIN — ISOSORBIDE MONONITRATE 30 MG: 30 TABLET, EXTENDED RELEASE ORAL at 17:59

## 2020-04-18 RX ADMIN — OXYCODONE HYDROCHLORIDE 15 MG: 10 TABLET ORAL at 01:00

## 2020-04-18 RX ADMIN — OXYCODONE HYDROCHLORIDE 15 MG: 10 TABLET ORAL at 10:04

## 2020-04-18 RX ADMIN — Medication 500 MG: at 05:40

## 2020-04-18 RX ADMIN — SPIRONOLACTONE 50 MG: 50 TABLET ORAL at 05:39

## 2020-04-18 RX ADMIN — ASPIRIN AND DIPYRIDAMOLE 1 CAPSULE: 25; 200 CAPSULE, EXTENDED RELEASE ORAL at 17:58

## 2020-04-18 RX ADMIN — SPIRONOLACTONE 50 MG: 50 TABLET ORAL at 17:59

## 2020-04-18 RX ADMIN — LOSARTAN POTASSIUM 50 MG: 50 TABLET, FILM COATED ORAL at 05:40

## 2020-04-18 RX ADMIN — ATENOLOL 100 MG: 50 TABLET ORAL at 05:39

## 2020-04-18 RX ADMIN — CLOPIDOGREL BISULFATE 75 MG: 75 TABLET ORAL at 05:39

## 2020-04-18 RX ADMIN — AMLODIPINE BESYLATE 10 MG: 10 TABLET ORAL at 05:39

## 2020-04-18 RX ADMIN — SODIUM CHLORIDE: 9 INJECTION, SOLUTION INTRAVENOUS at 13:08

## 2020-04-18 RX ADMIN — NYSTATIN: 100000 POWDER TOPICAL at 17:59

## 2020-04-18 RX ADMIN — OMEPRAZOLE 20 MG: 20 CAPSULE, DELAYED RELEASE ORAL at 05:39

## 2020-04-18 RX ADMIN — HYDRALAZINE HYDROCHLORIDE 100 MG: 25 TABLET, FILM COATED ORAL at 05:38

## 2020-04-18 RX ADMIN — OXYCODONE HYDROCHLORIDE 15 MG: 10 TABLET ORAL at 05:42

## 2020-04-18 ASSESSMENT — ENCOUNTER SYMPTOMS
MYALGIAS: 1
DOUBLE VISION: 0
SORE THROAT: 0
DIZZINESS: 0
BACK PAIN: 0
FOCAL WEAKNESS: 1
FEVER: 0
SHORTNESS OF BREATH: 0
VOMITING: 0
NAUSEA: 0
CHILLS: 0
HEADACHES: 0
DIARRHEA: 0
ABDOMINAL PAIN: 0
BLURRED VISION: 0
CONSTIPATION: 0

## 2020-04-18 NOTE — CARE PLAN
Problem: Communication  Goal: The ability to communicate needs accurately and effectively will improve  Outcome: PROGRESSING AS EXPECTED     Problem: Safety  Goal: Will remain free from injury  Outcome: PROGRESSING AS EXPECTED  Goal: Will remain free from falls  Outcome: PROGRESSING AS EXPECTED     Problem: Infection  Goal: Will remain free from infection  Outcome: PROGRESSING AS EXPECTED     Problem: Venous Thromboembolism (VTW)/Deep Vein Thrombosis (DVT) Prevention:  Goal: Patient will participate in Venous Thrombosis (VTE)/Deep Vein Thrombosis (DVT)Prevention Measures  Outcome: PROGRESSING AS EXPECTED     Problem: Bowel/Gastric:  Goal: Normal bowel function is maintained or improved  Outcome: PROGRESSING AS EXPECTED  Goal: Will not experience complications related to bowel motility  Outcome: PROGRESSING AS EXPECTED     Problem: Knowledge Deficit  Goal: Knowledge of disease process/condition, treatment plan, diagnostic tests, and medications will improve  Outcome: PROGRESSING AS EXPECTED  Goal: Knowledge of the prescribed therapeutic regimen will improve  Outcome: PROGRESSING AS EXPECTED     Problem: Discharge Barriers/Planning  Goal: Patient's continuum of care needs will be met  Outcome: PROGRESSING AS EXPECTED     Problem: Pain Management  Goal: Pain level will decrease to patient's comfort goal  Outcome: PROGRESSING AS EXPECTED     Problem: Skin Integrity  Goal: Risk for impaired skin integrity will decrease  Outcome: PROGRESSING AS EXPECTED     Problem: Respiratory:  Goal: Respiratory status will improve  Outcome: PROGRESSING AS EXPECTED     Problem: Mobility  Goal: Risk for activity intolerance will decrease  Outcome: PROGRESSING AS EXPECTED     Problem: Urinary Elimination:  Goal: Ability to reestablish a normal urinary elimination pattern will improve  Outcome: PROGRESSING AS EXPECTED

## 2020-04-18 NOTE — PROGRESS NOTES
"Pt A&Ox4. VSS. Pt c/o RLE throbbing pain, 7/10. Medicated per MAR. Pt refuses turns frequently. Refused 2 RN skin check several times. Pt stated \"I just want to be comfortable.\" Pt re approached throughout night for repositioning. Pt educated on importance of turns to prevent skin breakdown. Pt has condom cath on, did fall off early in night and was replaced. Partial skin assessment done. Red, blanchable coccyx noted and covered with mepilex. Ace wrap noted to RLE AKA. LAKA covered with mepilex. Redness noted to groin and pannus, cleansed and covered with nystatin powder and interdry. Pt continues on IV Abx. Pt denies any N&V. Denies SOB. 2 Guards @ pt bedside. Pt has call light in reach, bed locked in lowest position. Waffle mattress overlay in place, Q2hr turns attempted and continued, pillows for positioning. Hourly rounding done. Pt denies any further needs @ this time. Will continue to monitor.   "

## 2020-04-18 NOTE — PROGRESS NOTES
LIMB PRESERVATION SERVICE  PROGRESS NOTE    HPI: 64 y.o.  with a past medical history that includes type 2 diabetes, CVA, obesity, left above-the-knee amputation (2017) admitted 4/9/2020 for DM ulcers RLE leg and foot.     LPS has been consulted for evaluation of diabetic ulcers to right foot and lower extremity.  The patient reports that he has had ulcers to his right foot and right lower leg for 3 weeks (3/23/2020) for which he has been managing with wound VAC and antibiotics.  His wounds have not improved despite these interventions.  Documentation show that his wound culture was growing Pseudomonas and enterococcus and he was treated with unknown antibiotics.  Arterial studies at the correctional facility reported to have been done but were not transferred.  Vascular surgery was consulted, completed repeat arterial studies which showed poor arterial blood flow to the right lower extremity.  Vascular surgery recommended angiogram before surgical intervention. The patient reports pain around his ulcers, surrounding erythema, drainage for an unknown duration of time.  The patient denies fever, chills, nausea, vomiting, chest pain, shortness of breath, headache.      PROCEDURE DATE: 4/13/20 by Dr. Dennis  PROCEDURE: SFA angioplasty      PROCEDURE DATE: 4/15/2020 by Dr. Hairston  PROCEDURE: Right above-knee amputation    4/14/2020: Patient denies fevers, chills, nausea, vomiting.  Pain controlled except when right heel is palpated. Unfortunately does not have adequate blood flow to heal ulcers. Angioplasty was successful to ensure adequate flow to heal amputation per vascular surgery. Planning for BKA vs AKA to RLE, pt undecided.   4/17/2020: Patient is POD#2 right AKA with Dr. Hairston. Large amount of sanguinous drainage to post-operative dressing,distal inferior incision oozes sanguinous drainage. Patient reports pain to medial side of right AKA that is tolerable with occasional breakthrough pain.  Patient  "denies fever, chills, n/v, CP, SOB.   4/18/2020: Patient is POD #3 right AKA with .  Patient assessed with Terry SMITH and Zion BRAVO at bedside.  Correctional facility guards x2 at bedside. small amount of sanguinous drainage to dressing.  Scant amount of sanguinous oozing with palpation of distal inferior incision.  Patient reports pain and voiced frustration with not having pain medication when he wants it.  I have reiterated that analgesics are scheduled as has nursing.  Denies fever, chills, nausea, vomiting, diarrhea, chest pain, shortness of breath.      EXAM:      /69   Pulse 76   Temp 36.3 °C (97.3 °F) (Temporal)   Resp 18   Ht 1.708 m (5' 7.24\")   Wt 90.1 kg (198 lb 10.2 oz)   SpO2 93%   BMI 30.89 kg/m²     Pedal Pulses: Palpable right femoral pulse  Sensation: sensation intact to right AKA stump      Wound :  Small amount of sanguinous drainage to right AKA dressing.  Incision is well approximated with sutures, no maceration or incisional necrosis.  There is a small area of ecchymosis approximately 1 cm² to the distal inferior incision. There is no fluctuance with palpation.  Tenderness to inferior and medial side of stump and scant amount of sanguinous drainage with palpation.  No erythema or odor.    Left AKA stump well approximated and protected with Mepilex      Right AKA        Wound Care:   Continue single-layer adaptic to incision for non-adhesion, abd pad for absorbency, roll gauze for padding and securement. Ace wrap x2 for compression.  Continue to protect left AKA with mepilex    DIABETES MANAGEMENT:    Blood glucose:   Results from last 7 days   Lab Units 04/18/20  1124 04/18/20  0836 04/17/20  2110 04/17/20  1814 04/17/20  1322 04/17/20  0859 04/17/20  0625 04/16/20  2153   ACCU CHECK GLUCOSE 788 mg/dL 229* 245* 277* 249* 235* 273* 182* 217*     A1c:   Lab Results   Component Value Date/Time    HBA1C 7.1 (H) 04/10/2020 04:25 AM          INFECTION " MANAGEMENT:    Results from last 7 days   Lab Units 04/18/20  0057 04/17/20  1845 04/17/20  1215 04/17/20  0059 04/16/20  0330 04/15/20  0030   WBC 1501 K/uL 12.5* 13.7* 11.6* 12.4* 11.2* 8.8   PLATELET COUNT 1518 K/uL 309 306 262 289 269 267     Wound culture results:   Results     Procedure Component Value Units Date/Time    CULTURE WOUND W/ GRAM STAIN [[id number]]  (Abnormal)  (Susceptibility) Collected:  04/09/20 2340    Order Status:  Completed Specimen:  Wound from Left Foot Updated:  04/12/20 0803     Significant Indicator POS     Source WND     Site LEFT FOOT     Culture Result Moderate growth mixed skin adrianna.     Gram Stain Result No organisms seen.     Culture Result Pseudomonas aeruginosa  Light growth  P.aeruginosa may develop resistance during prolonged therapy  with all antibiotics. Isolates that are initially susceptible  may become resistant within three to four days after  initiation of therapy. Testing of repeat isolates may be  warranted.      Narrative:       Collected By:[id number] NASIR MORLEY  Collected By:[id number] NASIR MORLEY    Susceptibility     Pseudomonas aeruginosa (1)     Antibiotic Interpretation Microscan Method Status    Ceftazidime Sensitive 4 mcg/mL NICOLAS Final    Ciprofloxacin Sensitive <=1 mcg/mL NICOLAS Final    Cefepime Sensitive <=2 mcg/mL NICOLAS Final    Amikacin Sensitive <=16 mcg/mL NICOLAS Final    Gentamicin Sensitive <=4 mcg/mL NICOLAS Final    Tobramycin Sensitive <=4 mcg/mL NICOLAS Final    Meropenem Sensitive <=1 mcg/mL NICOLAS Final    Pip/Tazobactam Sensitive <=16 mcg/mL NICOLAS Final                        Surgical pathology, right leg, 4/15/2020    FINAL DIAGNOSIS:    A. Right leg:         Above the knee amputation showing skin ulceration and underlying          abscess formation and soft tissue necrosis.         Positive for acute osteomyelitis.         Osteomyelitis is not seen at the otilia margin.      ASSESSMENT/PLAN:   There is decrease in amount of sanguinous drainage on dressing from right  AKA site and scant amount oozing from incision.  There is a new area of ecchymosis to the distal inferior aspect of the incision but there is no fluctuance.  There are no signs of infection.  Wound should continue to heal with wound care and compression.      Wound care:   -Wound care orders for nursing in place.  No changes from yesterday  Continue single-layer adaptic to incision for non-adhesion, abd pad for absorbency, roll gauze for padding and securement. Ace wrap x2 for compression.  Change daily.    Labs/Imaging:  -no further labs or imaging at this time    Vascular status:   -S/P right SFA angioplasty by Dr. Dennis on 4/13/2020.  Palpable right femoral pulse    Surgery:   -none at this time    Antibiotics:   -Zosyn discontinued by hospitalist    Weight Bearing Status:   -Non Weight bearing    Offloading:   Elevate right AKA.    PT/OT :   Seen 4/16/2020      Diabetes Education:   -involved, last seen 4/13/20  - Implications of loss of protective sensation (LOPS) discussed with patient- including increased risk for amputation.  Advised to check feet at least daily, moisturize feet, and to always wear protective foot wear.   -avoid trimming own nails. See podiatrist or certified foot and nail RN  -keep blood sugars <150 for improved wound healing        DISCHARGE PLAN:    Disposition: return to [facility] ([facility]) with f/u with ortho 3 weeks post op on discharge.  LPS to continue to follow while patient remains inpatient.    Discussed with: patient, Cathy bedside RN, [facility] hospitalist Zion SMITH    Please note that this dictation was created using voice recognition software. I have  worked with technical experts from [employer] to optimize the interface.  I have made every reasonable attempt to correct obvious errors, but there may be errors of grammar and possibly content that I did not discover before finalizing the note.    Gena Muñoz, A.P.R.N.    If any questions or concerns, please call [phone]

## 2020-04-18 NOTE — PROGRESS NOTES
2 RN skin check complete    Ears free of redness or breakdown  mepilex to BL elbows, CDI.   Skin of pannus/abdomen red. Nystatin powder and interdry in place,  Left healed AKA with mepilex, CDI  Right new AKA with surgical dressing, CDI. Changed today by limb preservation APRN  Sacrum red with slow blanching, small areas of excoriation/drainage, Mepilex in place, CDI.     Waffle mattress in place, Q2H turns, mepilex in place, condom cath in place,     Patient intermittently refuses turns.

## 2020-04-18 NOTE — PROGRESS NOTES
LIMB PRESERVATION SERVICE  PROGRESS NOTE    HPI: 64 y.o.  with a past medical history that includes type 2 diabetes, CVA, obesity, left above-the-knee amputation (2017) admitted 4/9/2020 for DM ulcers RLE leg and foot.     LPS has been consulted for evaluation of diabetic ulcers to right foot and lower extremity.  The patient reports that he has had ulcers to his right foot and right lower leg for 3 weeks (3/23/2020) for which he has been managing with wound VAC and antibiotics.  His wounds have not improved despite these interventions.  Documentation show that his wound culture was growing Pseudomonas and enterococcus and he was treated with unknown antibiotics.  Arterial studies at the correctional facility reported to have been done but were not transferred.  Vascular surgery was consulted, completed repeat arterial studies which showed poor arterial blood flow to the right lower extremity.  Vascular surgery recommended angiogram before surgical intervention. The patient reports pain around his ulcers, surrounding erythema, drainage for an unknown duration of time.  The patient denies fever, chills, nausea, vomiting, chest pain, shortness of breath, headache.      PROCEDURE DATE: 4/13/20 by Dr. Dennis  PROCEDURE: SFA angioplasty      PROCEDURE DATE: 4/15/2020 by Dr. Hairston  PROCEDURE: Right above-knee amputation    4/14/2020: Patient denies fevers, chills, nausea, vomiting.  Pain controlled except when right heel is palpated. Unfortunately does not have adequate blood flow to heal ulcers. Angioplasty was successful to ensure adequate flow to heal amputation per vascular surgery. Planning for BKA vs AKA to RLE, pt undecided.   4/17/2020: Patient is POD#2 right AKA with Dr. Hairston. Large amount of sanguinous drainage to post-operative dressing,distal inferior incision oozes sanguinous drainage. Patient reports pain to medial side of right AKA that is tolerable with occasional breakthrough pain.  Patient  "denies fever, chills, n/v, CP, SOB.         EXAM:      /69   Pulse 65   Temp 36.3 °C (97.4 °F) (Temporal)   Resp 19   Ht 1.708 m (5' 7.24\")   Wt 90.1 kg (198 lb 10.2 oz)   SpO2 97%   BMI 30.89 kg/m²     Pedal Pulses: Palpable right femoral pulse  Sensation: sensation intact to right AKA stump      Wound :  Large amount of sanguinous drainage to right AKA post-op dressing. Incision is well approximated with sutures, no maceration or incisional necrosis seen. Mild fluctuance and moderate tenderness with palpation to distal inferior incision, site oozes sanguinous drainage.  No additional drainage manually expressed by APRN. No erythema, ecchymosis, or odor.     Left AKA stump well approximated and protected with Mepilex      Right AKA lateral       Right AKA distal      Wound Care:   Adaptic to incision for non-adhesion, abd pad for absorbency, roll gauze for padding and securement. Ace wrap x2 for compression.  Continue to protect left AKA with mepilex    DIABETES MANAGEMENT:    Blood glucose:   Results from last 7 days   Lab Units 04/17/20  1322 04/17/20  0859 04/17/20  0625 04/16/20  2153 04/16/20  1737 04/16/20  1019 04/16/20  0518 04/15/20  1708   ACCU CHECK GLUCOSE 788 mg/dL 235* 273* 182* 217* 264* 273* 230* 168*     A1c:   Lab Results   Component Value Date/Time    HBA1C 7.1 (H) 04/10/2020 04:25 AM          INFECTION MANAGEMENT:    Results from last 7 days   Lab Units 04/17/20  1215 04/17/20  0059 04/16/20  0330 04/15/20  0030 04/14/20  0024 04/12/20  0356   WBC 1501 K/uL 11.6* 12.4* 11.2* 8.8 10.3 9.4   PLATELET COUNT 1518 K/uL 262 289 269 267 306 284     Wound culture results:   Results     Procedure Component Value Units Date/Time    CULTURE WOUND W/ GRAM STAIN [[id number]]  (Abnormal)  (Susceptibility) Collected:  04/09/20 2340    Order Status:  Completed Specimen:  Wound from Left Foot Updated:  04/12/20 0803     Significant Indicator POS     Source WND     Site LEFT FOOT     Culture Result " Moderate growth mixed skin adrianna.     Gram Stain Result No organisms seen.     Culture Result Pseudomonas aeruginosa  Light growth  P.aeruginosa may develop resistance during prolonged therapy  with all antibiotics. Isolates that are initially susceptible  may become resistant within three to four days after  initiation of therapy. Testing of repeat isolates may be  warranted.      Narrative:       Collected By:[id number] NASIR MORLEY  Collected By:[id number] NASIR MORLEY    Susceptibility     Pseudomonas aeruginosa (1)     Antibiotic Interpretation Microscan Method Status    Ceftazidime Sensitive 4 mcg/mL NICOLAS Final    Ciprofloxacin Sensitive <=1 mcg/mL NICOLAS Final    Cefepime Sensitive <=2 mcg/mL NICOLAS Final    Amikacin Sensitive <=16 mcg/mL NICOLAS Final    Gentamicin Sensitive <=4 mcg/mL NICOLAS Final    Tobramycin Sensitive <=4 mcg/mL NICOLAS Final    Meropenem Sensitive <=1 mcg/mL NICOLAS Final    Pip/Tazobactam Sensitive <=16 mcg/mL NICOLAS Final                                ASSESSMENT/PLAN:   Patient had large amount of sanguinous drainage from right AKA site. There was tenderness and mild fluctuance from distal inferior aspect of the incision as well as sanguinous drainage oozing from incision. The incision itself is intact and does not appear infected. However, due to large amount of blood in dressing, continued oozing, and pain and fluctuance in distal inferior incision raises concern for possible development of hematoma vs seroma. New dressing was placed with ace wrap x2 for compression today and will recheck tomorrow. Discussed with [facility] Hospitalist SARAH, will coordinate to recheck together tomorrow and discuss with ortho. If continued drainage, will consider placement of incisional wound vac with wound care team.       Wound care:   -Wound care orders for nursing   Adaptic to incision for non-adhesion, abd pad for absorbency, roll gauze for padding and securement. Ace wrap x2 for compression.    Labs/Imaging:  -no further labs or  imaging at this time    Vascular status:   -S/P right SFA angioplasty by Dr. Dennis on 4/13/2020.  Palpable right femoral pulse    Surgery:   -none at this time    Antibiotics:   -Continue current antibiotics (zosyn).  Managed by hospitalist.    Weight Bearing Status:   -Non Weight bearing    Offloading:   Elevate right AKA.    PT/OT :   Seen 4/16/2020      Diabetes Education:   -involved, last seen 4/13/20  - Implications of loss of protective sensation (LOPS) discussed with patient- including increased risk for amputation.  Advised to check feet at least daily, moisturize feet, and to always wear protective foot wear.   -avoid trimming own nails. See podiatrist or certified foot and nail RN  -keep blood sugars <150 for improved wound healing        DISCHARGE PLAN:    Disposition: return to [facility] with f/u with ortho 3 weeks post op on discharge. Patient not clear from LPS standpoint. LPS to recheck right AKA tomorrow      Discussed with: patient, Eleanor mathis RN, and Terry SMITH    Please note that this dictation was created using voice recognition software. I have  worked with technical experts from [employer] to optimize the interface.  I have made every reasonable attempt to correct obvious errors, but there may be errors of grammar and possibly content that I did not discover before finalizing the note.    Gena Muñoz, A.P.R.N.    If any questions or concerns, please call [phone]

## 2020-04-18 NOTE — PROGRESS NOTES
[facility] Medicine Daily Progress Note    Date of Service  4/18/2020    Chief Complaint  64 y.o. male admitted 4/9/2020 with Leg wound       Hospital Course    64 y.o. male who presented 4/9/2020 with stroke, DM, HTN who presented from correctional facility with right diabetic foot ulcers that have been not healing despite antibiotics and wound Vac.  His documents show that his wound culture was growing Pseudomonas and enterococcus and he was treated with oral antibiotics.  On admission Dr. Lebron was consulted.  He underwent RLE angiogram on 4/13 showing aortoiliac occlusive disease.  He is pending AKA vs BKA.  Ortho has been consulted.  Continue IV antibiotics and tight glycemic control.    Interval Problem Update  4/14:  Pending AKA vs BKA tomorrow.  This is to be determined per patient and surgery team.  Sodium noted be 126.  He does have a history of hyponatremia which appears to be chronic.  Workup ordered.  He does have pain to RLE.  Denies shortness of breath, nausea, or vomiting.  VSS.  Afebrile.  4/15:  S/P AKA.  Discontinue antibiotics.  Does endorse pain to surgical site.  Surgical dressing in place.  Sodium 126.  Hold IVF and monitor.     4/16:  POD #1.  Pain uncontrolled.  Medications adjusted.  Bleeding from surgical site overnight, although resolved with reinforcement.  Sodium 125.  Continue fluid restriction.  Repeat BMP.  Afebrile.  VSS.   4/17:  Hgb 7.4 this am.  No evidence of active bleed.  Suspect aspect of blood loss anemia from recent surgery.  Hemodynamically stable.  Trend hgb.  More somnolent this am.  Will de-escalate pain meds if this persists.  Awaiting first dressing change per ortho surgery.   4/18:  Dressing changed at bedside.  Small amount of bloody drainage noted.  No evidence of infection.  Will change dressing in 2 days to ensure further improvement of drainage.  Continues to be aggressive with pain medication.  Attempt conservative pain management as tolerated.  Denies fever,  chills, or shortness of breath.    Consultants/Specialty  Vascular surgery  Ortho surgery.     Code Status  full    Disposition  Anticipate transfer back to [facility] with jose r lift when medically clear.      Review of Systems  Review of Systems   Constitutional: Positive for malaise/fatigue. Negative for chills and fever.   HENT: Negative for congestion and sore throat.    Eyes: Negative for blurred vision and double vision.   Respiratory: Negative for shortness of breath.    Cardiovascular: Negative for chest pain and leg swelling.   Gastrointestinal: Negative for abdominal pain, constipation, diarrhea, nausea and vomiting.   Genitourinary: Negative for dysuria.   Musculoskeletal: Positive for joint pain and myalgias. Negative for back pain.   Skin: Negative for itching and rash.   Neurological: Positive for focal weakness (chronic left sided weakness secondary to h/o cva. ). Negative for dizziness and headaches.        Physical Exam  Temp:  [36.3 °C (97.3 °F)-36.5 °C (97.7 °F)] 36.3 °C (97.3 °F)  Pulse:  [65-83] 76  Resp:  [16-19] 18  BP: (120-168)/(63-70) 120/69  SpO2:  [91 %-97 %] 93 %    Physical Exam  Constitutional:       General: He is not in acute distress.     Appearance: He is not toxic-appearing.      Comments: Chronically-ill appearing male.    HENT:      Head: Normocephalic and atraumatic.      Nose: Nose normal. No congestion.      Mouth/Throat:      Mouth: Mucous membranes are dry.      Pharynx: Oropharynx is clear. No oropharyngeal exudate.   Eyes:      General:         Right eye: No discharge.         Left eye: No discharge.      Conjunctiva/sclera: Conjunctivae normal.   Neck:      Musculoskeletal: Normal range of motion and neck supple. No neck rigidity or muscular tenderness.   Cardiovascular:      Rate and Rhythm: Normal rate and regular rhythm.      Heart sounds: Normal heart sounds. No murmur.   Pulmonary:      Effort: Pulmonary effort is normal. No respiratory distress.      Breath sounds:  Normal breath sounds. No wheezing or rales.   Abdominal:      General: There is no distension.      Palpations: Abdomen is soft.      Tenderness: There is no abdominal tenderness.   Musculoskeletal: Normal range of motion.         General: Tenderness, deformity and signs of injury present.      Comments: Eschar and ulcers are noted on the  Lateral side of the right foot  Left AKA.  S/P right AKA on 4/15.  Surgical dressing place with small amount of bloody drainage.    Skin:     General: Skin is warm and dry.      Coloration: Skin is pale. Skin is not jaundiced.   Neurological:      Mental Status: He is alert and oriented to person, place, and time. Mental status is at baseline.      Sensory: Sensory deficit present.      Comments: Chronic left hemiparesis and dysphagia secondary to history of CVA.    Psychiatric:         Mood and Affect: Mood normal.         Fluids    Intake/Output Summary (Last 24 hours) at 4/18/2020 1147  Last data filed at 4/18/2020 0905  Gross per 24 hour   Intake 880 ml   Output 1350 ml   Net -470 ml       Laboratory  Recent Labs     04/17/20  1215 04/17/20  1845 04/18/20  0057   WBC 11.6* 13.7* 12.5*   RBC 2.83* 3.22* 2.98*   HEMOGLOBIN 7.4* 8.3* 7.8*   HEMATOCRIT 22.6* 25.5* 23.4*   MCV 79.9* 79.2* 78.5*   MCH 26.1* 25.8* 26.2*   MCHC 32.7* 32.5* 33.3*   RDW 42.9 41.9 41.2   PLATELETCT 262 306 309   MPV 9.6 9.7 9.6     Recent Labs     04/16/20  1223 04/17/20  0059 04/18/20  0057   SODIUM 124* 128* 124*   POTASSIUM 4.5 4.2 4.0   CHLORIDE 89* 95* 90*   CO2 19* 20 22   GLUCOSE 324* 227* 256*   BUN 12 18 11   CREATININE 0.69 0.72 0.60   CALCIUM 8.9 8.9 8.9                   Imaging       Assessment/Plan  * Diabetic foot ulcer (HCC)- (present on admission)  Assessment & Plan  Multiple wounds of RLE  Not healing despite oral antibiotics and wound VAC  Wound culture with pseudomonas and enterococcus   Vascular surgery following  S/P RLE angiogram on 4/13 showing aortoiliac occlusive disease  S/P  right AKA on 4/15  Pathology findings consistent with OM  Mild bloody drainage from surgical site, although this improving.  No evidence of infection.    Dressing to be changed in 2 days to ensure further improvement of drainage and no hematoma development.   Discontinue zosyn.  Ortho surgery and LPS following.   Continue pain control IV and oral narcotics.    Continue neurontin.  PT/OT following    S/P AKA (above knee amputation), right ([facility])  Assessment & Plan  Secondary to OM  PT/OT following  Will need jose r lift at discharge.   Continue pain control     PAD (peripheral artery disease) ([facility])- (present on admission)  Assessment & Plan  S/P RLE angiogram showing PAD and aortoiliac occlusive disease  Vascular surgery and ortho following.   Right AKA 4/15.   Continue lipitor, aggrenox, and plavix.     Dysphagia as late effect of stroke- (present on admission)  Assessment & Plan  History of  SLP following  Continue modified diet.     Hyponatremia- (present on admission)  Assessment & Plan  Appears chronic on review of records  High urine sodium with low serum osmo  ?SIADH  Asymptomatic  Follow bmp.     History of stroke- (present on admission)  Assessment & Plan  With residual left-sided weakness and dysphasia  Aggrenox, plavix, and lipitor.     HTN (hypertension)- (present on admission)  Assessment & Plan  Will monitor  continue on Norvasc, hydralazine, Imdur, atenolol, losartan    DM (diabetes mellitus) ([facility])- (present on admission)  Assessment & Plan  hema A1c is 7.1  Glucose poorly controlled.  Increase lantus to 35 units every evening.   SSI  Attempt to maintain BG < 150 to promote healing.   accu checks are noted  Diabetic diet    Iron deficiency anemia- (present on admission)  Assessment & Plan  No evidence of active bleed  Iron 28  Continue ferrous sulfate and ascorbic acid.   Aspect of blood loss anemia secondary to surgery  Hgb stable.   Continue to follow cbc  Transfuse for hgb < 7.        VTE prophylaxis:  scds.  Anticoagulation held secondary to anemia.

## 2020-04-18 NOTE — CARE PLAN
Problem: Safety  Goal: Will remain free from falls  Outcome: PROGRESSING AS EXPECTED  Note: Bed alarm on, side rails up x2, call light within reach, patient with 2 guards at bedside     Problem: Skin Integrity  Goal: Risk for impaired skin integrity will decrease  Outcome: PROGRESSING AS EXPECTED  Note: Waffle mattress in place, mepilex to L healed BKA and BL elbows Q2H turns, condom cath in place

## 2020-04-18 NOTE — PROGRESS NOTES
I have personally seen and examined / evaluated the patient, discussed the patient's evaluation & management plan with SARAH Gillette and I have reviewed the note below.      I agree with the findings, history, examination and assessment / plan as listed below with changes/addendum as listed below by me in my addendum / attestation separetely.      Patient with underlying history of diabetes mellitus, hypertension, history of CVA, history of left AKA who was admitted from the correctional facility for evaluation of right diabetic foot ulcer.  Initiated on empiric antibiotic therapy, consultation from limb preservation service, vascular surgery obtained.  Patient underwent angiographic evaluation by vascular surgery on April 13, 2020.  Findings of aortoiliac occlusive disease.  Peripheral arterial disease.  LPS involved orthopedics team for evaluation of BKA versus AKA.  Dr. Hairston took the patient to the OR on April 15, 2020 and patient underwent a right-sided above-knee amputation.     At this time postoperative care being continued by surgical team.     Patient needs ongoing pain control including the use of IV morphine, monitor for any adverse effects related to the use of IV morphine, high risk medication.     For now continue IV antibiotic therapy, de-escalate post first dressing change.  Discussed with Terry.   Antibiotics can be stopped tomorrow.      Continue antiplatelet therapy, risk factor modifications including management of hypertension, dyslipidemia and diabetes mellitus.       Monitor sodium levels,?  SIADH/others.  Sodium levels improved after gentle fluid hydration.  Monitor at this time. Repeat NS infusion x 10 hours.      Improve glycemic control, transition to Lantus 35 units from N[facility] and sliding scale insulin 3.  Titrate to achieve normal glycemic control.    Leobardo Honeycutt M.D.  04/18/20  11:20 AM

## 2020-04-19 LAB
ANION GAP SERPL CALC-SCNC: 12 MMOL/L (ref 7–16)
BASOPHILS # BLD AUTO: 0.4 % (ref 0–1.8)
BASOPHILS # BLD: 0.04 K/UL (ref 0–0.12)
BUN SERPL-MCNC: 9 MG/DL (ref 8–22)
CALCIUM SERPL-MCNC: 9 MG/DL (ref 8.5–10.5)
CHLORIDE SERPL-SCNC: 92 MMOL/L (ref 96–112)
CO2 SERPL-SCNC: 24 MMOL/L (ref 20–33)
CREAT SERPL-MCNC: 0.42 MG/DL (ref 0.5–1.4)
EOSINOPHIL # BLD AUTO: 0.08 K/UL (ref 0–0.51)
EOSINOPHIL NFR BLD: 0.7 % (ref 0–6.9)
ERYTHROCYTE [DISTWIDTH] IN BLOOD BY AUTOMATED COUNT: 43.9 FL (ref 35.9–50)
GLUCOSE BLD-MCNC: 205 MG/DL (ref 65–99)
GLUCOSE BLD-MCNC: 209 MG/DL (ref 65–99)
GLUCOSE BLD-MCNC: 231 MG/DL (ref 65–99)
GLUCOSE BLD-MCNC: 243 MG/DL (ref 65–99)
GLUCOSE SERPL-MCNC: 260 MG/DL (ref 65–99)
HCT VFR BLD AUTO: 24.6 % (ref 42–52)
HGB BLD-MCNC: 8 G/DL (ref 14–18)
IMM GRANULOCYTES # BLD AUTO: 0.09 K/UL (ref 0–0.11)
IMM GRANULOCYTES NFR BLD AUTO: 0.8 % (ref 0–0.9)
LYMPHOCYTES # BLD AUTO: 2.45 K/UL (ref 1–4.8)
LYMPHOCYTES NFR BLD: 22.3 % (ref 22–41)
MCH RBC QN AUTO: 26.3 PG (ref 27–33)
MCHC RBC AUTO-ENTMCNC: 32.5 G/DL (ref 33.7–35.3)
MCV RBC AUTO: 80.9 FL (ref 81.4–97.8)
MONOCYTES # BLD AUTO: 0.99 K/UL (ref 0–0.85)
MONOCYTES NFR BLD AUTO: 9 % (ref 0–13.4)
NEUTROPHILS # BLD AUTO: 7.33 K/UL (ref 1.82–7.42)
NEUTROPHILS NFR BLD: 66.8 % (ref 44–72)
NRBC # BLD AUTO: 0 K/UL
NRBC BLD-RTO: 0 /100 WBC
PLATELET # BLD AUTO: 302 K/UL (ref 164–446)
PMV BLD AUTO: 9.8 FL (ref 9–12.9)
POTASSIUM SERPL-SCNC: 4.2 MMOL/L (ref 3.6–5.5)
RBC # BLD AUTO: 3.04 M/UL (ref 4.7–6.1)
SODIUM SERPL-SCNC: 128 MMOL/L (ref 135–145)
WBC # BLD AUTO: 11 K/UL (ref 4.8–10.8)

## 2020-04-19 PROCEDURE — A9270 NON-COVERED ITEM OR SERVICE: HCPCS | Performed by: FAMILY MEDICINE

## 2020-04-19 PROCEDURE — 80048 BASIC METABOLIC PNL TOTAL CA: CPT

## 2020-04-19 PROCEDURE — A9270 NON-COVERED ITEM OR SERVICE: HCPCS | Performed by: INTERNAL MEDICINE

## 2020-04-19 PROCEDURE — 770001 HCHG ROOM/CARE - MED/SURG/GYN PRIV*

## 2020-04-19 PROCEDURE — 700102 HCHG RX REV CODE 250 W/ 637 OVERRIDE(OP): Performed by: INTERNAL MEDICINE

## 2020-04-19 PROCEDURE — A9270 NON-COVERED ITEM OR SERVICE: HCPCS | Performed by: SURGERY

## 2020-04-19 PROCEDURE — 700102 HCHG RX REV CODE 250 W/ 637 OVERRIDE(OP): Performed by: SURGERY

## 2020-04-19 PROCEDURE — A9270 NON-COVERED ITEM OR SERVICE: HCPCS | Performed by: PHYSICIAN ASSISTANT

## 2020-04-19 PROCEDURE — 36415 COLL VENOUS BLD VENIPUNCTURE: CPT

## 2020-04-19 PROCEDURE — 82962 GLUCOSE BLOOD TEST: CPT

## 2020-04-19 PROCEDURE — 99233 SBSQ HOSP IP/OBS HIGH 50: CPT | Performed by: INTERNAL MEDICINE

## 2020-04-19 PROCEDURE — 85025 COMPLETE CBC W/AUTO DIFF WBC: CPT

## 2020-04-19 PROCEDURE — A9270 NON-COVERED ITEM OR SERVICE: HCPCS | Performed by: NURSE PRACTITIONER

## 2020-04-19 PROCEDURE — 700102 HCHG RX REV CODE 250 W/ 637 OVERRIDE(OP): Performed by: NURSE PRACTITIONER

## 2020-04-19 PROCEDURE — 700102 HCHG RX REV CODE 250 W/ 637 OVERRIDE(OP): Performed by: PHYSICIAN ASSISTANT

## 2020-04-19 PROCEDURE — 700102 HCHG RX REV CODE 250 W/ 637 OVERRIDE(OP): Performed by: FAMILY MEDICINE

## 2020-04-19 RX ORDER — INSULIN GLARGINE 100 [IU]/ML
40 INJECTION, SOLUTION SUBCUTANEOUS EVERY EVENING
Status: DISCONTINUED | OUTPATIENT
Start: 2020-04-19 | End: 2020-04-20

## 2020-04-19 RX ORDER — OXYCODONE HCL 10 MG/1
10 TABLET, FILM COATED, EXTENDED RELEASE ORAL EVERY 12 HOURS
Status: DISCONTINUED | OUTPATIENT
Start: 2020-04-19 | End: 2020-04-21 | Stop reason: HOSPADM

## 2020-04-19 RX ORDER — DEXTROSE MONOHYDRATE 25 G/50ML
50 INJECTION, SOLUTION INTRAVENOUS
Status: DISCONTINUED | OUTPATIENT
Start: 2020-04-19 | End: 2020-04-20

## 2020-04-19 RX ORDER — INSULIN GLARGINE 100 [IU]/ML
40 INJECTION, SOLUTION SUBCUTANEOUS EVERY EVENING
Status: DISCONTINUED | OUTPATIENT
Start: 2020-04-19 | End: 2020-04-19

## 2020-04-19 RX ORDER — MORPHINE SULFATE 4 MG/ML
1-4 INJECTION, SOLUTION INTRAMUSCULAR; INTRAVENOUS
Status: DISCONTINUED | OUTPATIENT
Start: 2020-04-19 | End: 2020-04-21 | Stop reason: HOSPADM

## 2020-04-19 RX ADMIN — ACETAMINOPHEN 650 MG: 325 TABLET, FILM COATED ORAL at 06:10

## 2020-04-19 RX ADMIN — HYDRALAZINE HYDROCHLORIDE 100 MG: 25 TABLET, FILM COATED ORAL at 17:06

## 2020-04-19 RX ADMIN — Medication 500 MG: at 06:00

## 2020-04-19 RX ADMIN — OXYCODONE HYDROCHLORIDE 10 MG: 10 TABLET, FILM COATED, EXTENDED RELEASE ORAL at 11:35

## 2020-04-19 RX ADMIN — ASPIRIN AND DIPYRIDAMOLE 1 CAPSULE: 25; 200 CAPSULE, EXTENDED RELEASE ORAL at 18:04

## 2020-04-19 RX ADMIN — PREGABALIN 75 MG: 75 CAPSULE ORAL at 17:05

## 2020-04-19 RX ADMIN — OMEPRAZOLE 20 MG: 20 CAPSULE, DELAYED RELEASE ORAL at 06:01

## 2020-04-19 RX ADMIN — SPIRONOLACTONE 50 MG: 50 TABLET ORAL at 17:06

## 2020-04-19 RX ADMIN — OXYCODONE HYDROCHLORIDE 10 MG: 10 TABLET, FILM COATED, EXTENDED RELEASE ORAL at 18:08

## 2020-04-19 RX ADMIN — INSULIN LISPRO 4 UNITS: 100 INJECTION, SOLUTION INTRAVENOUS; SUBCUTANEOUS at 21:40

## 2020-04-19 RX ADMIN — CLOPIDOGREL BISULFATE 75 MG: 75 TABLET ORAL at 06:01

## 2020-04-19 RX ADMIN — OXYCODONE HYDROCHLORIDE 15 MG: 10 TABLET ORAL at 17:05

## 2020-04-19 RX ADMIN — SENNOSIDES AND DOCUSATE SODIUM 2 TABLET: 8.6; 5 TABLET ORAL at 06:01

## 2020-04-19 RX ADMIN — HYDRALAZINE HYDROCHLORIDE 100 MG: 25 TABLET, FILM COATED ORAL at 06:01

## 2020-04-19 RX ADMIN — OXYCODONE HYDROCHLORIDE 15 MG: 10 TABLET ORAL at 06:10

## 2020-04-19 RX ADMIN — PREGABALIN 75 MG: 75 CAPSULE ORAL at 06:00

## 2020-04-19 RX ADMIN — ASPIRIN AND DIPYRIDAMOLE 1 CAPSULE: 25; 200 CAPSULE, EXTENDED RELEASE ORAL at 06:00

## 2020-04-19 RX ADMIN — PREGABALIN 75 MG: 75 CAPSULE ORAL at 11:35

## 2020-04-19 RX ADMIN — INSULIN GLARGINE 40 UNITS: 100 INJECTION, SOLUTION SUBCUTANEOUS at 18:04

## 2020-04-19 RX ADMIN — OXYCODONE HYDROCHLORIDE 15 MG: 10 TABLET ORAL at 22:10

## 2020-04-19 RX ADMIN — FERROUS SULFATE TAB 325 MG (65 MG ELEMENTAL FE) 325 MG: 325 (65 FE) TAB at 08:16

## 2020-04-19 RX ADMIN — INSULIN LISPRO 4 UNITS: 100 INJECTION, SOLUTION INTRAVENOUS; SUBCUTANEOUS at 18:07

## 2020-04-19 RX ADMIN — ATORVASTATIN CALCIUM 40 MG: 40 TABLET, FILM COATED ORAL at 21:26

## 2020-04-19 RX ADMIN — AMITRIPTYLINE HYDROCHLORIDE 50 MG: 25 TABLET, FILM COATED ORAL at 21:26

## 2020-04-19 RX ADMIN — NYSTATIN: 100000 POWDER TOPICAL at 06:06

## 2020-04-19 RX ADMIN — NYSTATIN: 100000 POWDER TOPICAL at 18:08

## 2020-04-19 RX ADMIN — ISOSORBIDE MONONITRATE 30 MG: 30 TABLET, EXTENDED RELEASE ORAL at 17:05

## 2020-04-19 RX ADMIN — LOSARTAN POTASSIUM 50 MG: 50 TABLET, FILM COATED ORAL at 06:01

## 2020-04-19 RX ADMIN — ATENOLOL 100 MG: 50 TABLET ORAL at 06:00

## 2020-04-19 RX ADMIN — ISOSORBIDE MONONITRATE 30 MG: 30 TABLET, EXTENDED RELEASE ORAL at 06:01

## 2020-04-19 RX ADMIN — AMLODIPINE BESYLATE 10 MG: 10 TABLET ORAL at 06:00

## 2020-04-19 RX ADMIN — SPIRONOLACTONE 50 MG: 50 TABLET ORAL at 06:01

## 2020-04-19 RX ADMIN — SENNOSIDES AND DOCUSATE SODIUM 2 TABLET: 8.6; 5 TABLET ORAL at 17:06

## 2020-04-19 ASSESSMENT — ENCOUNTER SYMPTOMS
BLURRED VISION: 0
HEADACHES: 0
TINGLING: 0
COUGH: 0
EYE DISCHARGE: 0
VOMITING: 0
NAUSEA: 0
DIZZINESS: 0
FOCAL WEAKNESS: 1
SHORTNESS OF BREATH: 0
DIARRHEA: 0
DOUBLE VISION: 0
ABDOMINAL PAIN: 0
PALPITATIONS: 0
SENSORY CHANGE: 1
MYALGIAS: 1
FEVER: 0

## 2020-04-19 NOTE — PROGRESS NOTES
Patient noted to have seeking type behavior.  Morning assessment yesterday and med pass patient reported 6/10 pain but was 45 mins for oxycodone, patient was given tylenol, within 20 mins patient calling for pain medication although he knew it was able to be given, unable to go directly to room as was passing someone elses meds. When I got in 15 mins after call I brought medication but patient now a zero on pain scale (see pain assessment). However patient was rude to me and mad I didnt come right away.  I expressed that I had given tylenol prior, it was too early and clearly the tylenol had worked as he was now a zero.  Patient stated he should get his meds when he wants them.  I explained that I cannot give them early and what resolution would he like to see to the situation.  Patient wants meds immediately when he calls (regardless of the fact he received some 20 mins prior and they hadn't even had a chance to take effect and that his other medication was too early.)    Patient encouraged to take oral meds last night for pain at dinner med pass, patient declined, was able to get tylenol. However patient when RN came for bedside handoff screaming in pain.  Patient given oral oxycodone by me, patient immediately stopped crying after he swallowed medication. Onset mechanism 30-60 mins of oral meds, clearly not capable of resolving pain after swallowing.  Patient several incidents overnight per night nurse with similar behaviors however was given morphine.  Patient several days out from surgery should be well managed with lyrica and oral oxycodone.  Discussed some of issues with team yesterday (bindu) and they offered to reduce medication, I declined at that time as patient does have dressing changes and may require IV medication at that time.  I will ask parameters to be place on morphine today for dressing changes only and to decrease interval to 2 x daily. He is daily changes however if dressing becomes soiled may  require another dressing change.

## 2020-04-19 NOTE — PROGRESS NOTES
"   Orthopaedic PA Progress Note    Interval changes:Dressing change today, seen with Hospitalist and LPS NPs.    ROS - Patient denies any new issues. No chest pain, dyspnea, or fever.  Pain well controlled.    /68   Pulse 77   Temp 36.6 °C (97.8 °F) (Temporal)   Resp 19   Ht 1.708 m (5' 7.24\")   Wt 90.1 kg (198 lb 10.2 oz)   SpO2 98%     Patient seen and examined  No acute distress  Breathing non labored  RRR  complatments soft. Dressing removed. Scant bleeding from wound incision. 2 cm dusky round appearance at posterior margin, center of incision    Recent Labs     04/17/20  1215 04/17/20  1845 04/18/20  0057   WBC 11.6* 13.7* 12.5*   RBC 2.83* 3.22* 2.98*   HEMOGLOBIN 7.4* 8.3* 7.8*   HEMATOCRIT 22.6* 25.5* 23.4*   MCV 79.9* 79.2* 78.5*   MCH 26.1* 25.8* 26.2*   MCHC 32.7* 32.5* 33.3*   RDW 42.9 41.9 41.2   PLATELETCT 262 306 309   MPV 9.6 9.7 9.6       Active Hospital Problems    Diagnosis   • S/P AKA (above knee amputation), right ([facility]) [Z89.611]     Priority: High   • Diabetic foot ulcer ([facility]) [E11.621, L97.509]     Priority: High   • PAD (peripheral artery disease) ([facility]) [I73.9]     Priority: High   • Dysphagia as late effect of stroke [I69.391]     Priority: Medium   • DM (diabetes mellitus) ([facility]) [E11.9]     Priority: Medium   • History of stroke [Z86.73]     Priority: Medium   • HTN (hypertension) [I10]     Priority: Medium   • Hyponatremia [E87.1]     Priority: Medium   • Iron deficiency anemia [D50.9]       Assessment/Plan:  POD#3 S/P R AKA  Wt bearing status - NWB  PT/OT-initiated  Wound care:per LPS..   Drains - none  Vaz-no  Sutures/Staples out- 10-14 days post operatively  Antibiotics: completed  DVT Prophylaxis- TEDS/SCDs/Foot pumps.   Lovenox: per med  Future Procedures - none  Case Coordination for Discharge Planning - Disposition pending, needs serial wound surveillance at least QOD. Follow labs. Elevate leg.      "

## 2020-04-19 NOTE — CARE PLAN
Problem: Bowel/Gastric:  Goal: Normal bowel function is maintained or improved  Outcome: PROGRESSING SLOWER THAN EXPECTED  Goal: Will not experience complications related to bowel motility  Outcome: PROGRESSING SLOWER THAN EXPECTED   MOM today    Problem: Knowledge Deficit  Goal: Knowledge of disease process/condition, treatment plan, diagnostic tests, and medications will improve  Outcome: PROGRESSING SLOWER THAN EXPECTED  Goal: Knowledge of the prescribed therapeutic regimen will improve  Outcome: PROGRESSING SLOWER THAN EXPECTED   Discussed pain medication  Problem: Pain Management  Goal: Pain level will decrease to patient's comfort goal  Outcome: PROGRESSING SLOWER THAN EXPECTED   Patient seeking behavior discussed with team  Problem: Urinary Elimination:  Goal: Ability to reestablish a normal urinary elimination pattern will improve  Outcome: PROGRESSING SLOWER THAN EXPECTED   Incont. Condom cath used

## 2020-04-19 NOTE — PROGRESS NOTES
"Received report from AM RN; assumed care. Guards bedside. During bedside report, pain medication requested; administered. Per further discussion, patient stated throbbing noted. It began in AM and then good most of the day. Reoccurred at change of shift/nighttime. Grimacing/tearfulness noted; therapeutic listening/pain management effective. Patient requested reassurance that pain will be addressed immediately. Patient reassured about trying to answer call light as quickly as possible. Patient educated about pain medications available. Patient verbalized understanding. A&O x 3, disoriented to time. VSS, BP improves with pain medication administration. 98% on RA. Slurred speech from previous stroke; left sided deficits noted. Medicated for pain; see MAR. Patient denies SOB, numbness, tingling, nausea, vomiting. , education provided. IS encouraged. Abdomen obese, round, semi-firm. Mediplex to elbows, left AKA. Removed from sacrum. Marco A cream applied. Waffle overlay in place. Q2 turning being attempted. Trapeze bar in place. Patient refused assessment of Right AKA/2 RN skin check at time of assessment, will re-attempt in AM. Ice provided to Right AKA; ineffective. Patient refusing limb elevation. 1L fluid restriction; ice chips allowed between meal per verbal discussion with hospitalist per report. Patient snacking frequently at beginning of shift. Patient tolerating diet. + void, condom cath in place for skin protection. Yellow urine noted.  + flatus. LBM 04/16/2020. Patient thought \"soiled\" himself, no BM noted. HFR; bed alarm on/engaged. POC discussed. Questions answered. Bed locked/lowest position. Call light/personal belongings within reach. All needs met at present time.   "

## 2020-04-19 NOTE — CARE PLAN
Problem: Safety  Goal: Will remain free from injury  Outcome: PROGRESSING AS EXPECTED  HFR; bed alarm on/engaged.     Problem: Bowel/Gastric:  Goal: Normal bowel function is maintained or improved  Outcome: PROGRESSING AS EXPECTED  Normoactive BS x 4. Patient tolerating diet/snacking. LBM 4/16.     Problem: Pain Management  Goal: Pain level will decrease to patient's comfort goal  Outcome: PROGRESSING AS EXPECTED  Medicated for pain; see MAR.     Problem: Skin Integrity  Goal: Risk for impaired skin integrity will decrease  Outcome: PROGRESSING AS EXPECTED  Jan < 17. 2 RN skin check refused. Will re-attempt. Marco A cream applied to sacrum.     Problem: Respiratory:  Goal: Respiratory status will improve  Outcome: PROGRESSING AS EXPECTED  IS demonstrated. 500 mL. Encouraged use.     Problem: Psychosocial Needs:  Goal: Level of anxiety will decrease  Outcome: PROGRESSING AS EXPECTED  Therapeutic listening provided. Calmed with pain management.

## 2020-04-19 NOTE — PROGRESS NOTES
[facility] Medicine Daily Progress Note    Date of Service  4/19/2020    Chief Complaint  64 y.o. male admitted 4/9/2020 with Leg wound       Hospital Course    64 y.o. male who presented 4/9/2020 with stroke, DM, HTN who presented from correctional facility with right diabetic foot ulcers that have been not healing despite antibiotics and wound Vac.  His documents show that his wound culture was growing Pseudomonas and enterococcus and he was treated with oral antibiotics.  On admission Dr. Lebron was consulted.  He underwent RLE angiogram on 4/13 showing aortoiliac occlusive disease.  He is pending AKA vs BKA.  Ortho has been consulted.  Continue IV antibiotics and tight glycemic control.    Interval Problem Update  4/14:  Pending AKA vs BKA tomorrow.  This is to be determined per patient and surgery team.  Sodium noted be 126.  He does have a history of hyponatremia which appears to be chronic.  Workup ordered.  He does have pain to RLE.  Denies shortness of breath, nausea, or vomiting.  VSS.  Afebrile.  4/15:  S/P AKA.  Discontinue antibiotics.  Does endorse pain to surgical site.  Surgical dressing in place.  Sodium 126.  Hold IVF and monitor.     4/16:  POD #1.  Pain uncontrolled.  Medications adjusted.  Bleeding from surgical site overnight, although resolved with reinforcement.  Sodium 125.  Continue fluid restriction.  Repeat BMP.  Afebrile.  VSS.   4/17:  Hgb 7.4 this am.  No evidence of active bleed.  Suspect aspect of blood loss anemia from recent surgery.  Hemodynamically stable.  Trend hgb.  More somnolent this am.  Will de-escalate pain meds if this persists.  Awaiting first dressing change per ortho surgery.   4/18:  Dressing changed at bedside.  Small amount of bloody drainage noted.  No evidence of infection.  Will change dressing in 2 days to ensure further improvement of drainage.  Continues to be aggressive with pain medication.  Attempt conservative pain management as tolerated.  Denies fever,  chills, or shortness of breath.  4/19:  Pain to surgical site uncontrolled. Start scheduled oxycontin.  No drainage noted to surgical dressing.  Sodium improved with IVF.  VSS.     Consultants/Specialty  Vascular surgery  Ortho surgery.     Code Status  full    Disposition  Anticipate transfer back to long-term with jose r lift when medically clear.      Review of Systems  Review of Systems   Constitutional: Positive for malaise/fatigue. Negative for fever.   HENT: Negative for congestion.    Eyes: Negative for blurred vision, double vision and discharge.   Respiratory: Negative for cough and shortness of breath.    Cardiovascular: Negative for chest pain, palpitations and leg swelling.   Gastrointestinal: Negative for abdominal pain, diarrhea, nausea and vomiting.   Genitourinary: Negative for dysuria and hematuria.   Musculoskeletal: Positive for joint pain and myalgias.   Skin: Negative for rash.   Neurological: Positive for sensory change (Phantom limb pain) and focal weakness (chronic left sided weakness secondary to h/o cva. ). Negative for dizziness, tingling and headaches.        Physical Exam  Temp:  [36.1 °C (97 °F)-37.2 °C (99 °F)] 37.2 °C (99 °F)  Pulse:  [75-90] 78  Resp:  [15-19] 18  BP: (115-148)/(61-73) 125/61  SpO2:  [95 %-99 %] 99 %    Physical Exam  Constitutional:       General: He is not in acute distress.     Appearance: He is not diaphoretic.      Comments: Chronically-ill appearing male.    HENT:      Head: Normocephalic and atraumatic.      Nose: Nose normal.      Mouth/Throat:      Mouth: Mucous membranes are dry.      Pharynx: Oropharynx is clear.   Eyes:      General: No scleral icterus.     Conjunctiva/sclera: Conjunctivae normal.   Neck:      Musculoskeletal: Normal range of motion and neck supple. No neck rigidity.   Cardiovascular:      Rate and Rhythm: Normal rate and regular rhythm.      Heart sounds: Normal heart sounds.   Pulmonary:      Effort: Pulmonary effort is normal. No  respiratory distress.      Breath sounds: Normal breath sounds. No wheezing.   Abdominal:      General: There is no distension.      Palpations: Abdomen is soft.      Tenderness: There is no abdominal tenderness. There is no guarding.   Musculoskeletal: Normal range of motion.         General: Tenderness, deformity and signs of injury present. No swelling.      Comments: Eschar and ulcers are noted on the  Lateral side of the right foot  Left AKA.  S/P right AKA on 4/15.  Surgical dressing in place.    Skin:     General: Skin is warm and dry.      Coloration: Skin is pale. Skin is not jaundiced.      Findings: No bruising.   Neurological:      Mental Status: He is alert and oriented to person, place, and time. Mental status is at baseline.      Sensory: Sensory deficit present.      Motor: Weakness present.      Comments: Chronic left hemiparesis and dysphagia secondary to history of CVA.    Psychiatric:         Mood and Affect: Mood normal.         Fluids    Intake/Output Summary (Last 24 hours) at 4/19/2020 1019  Last data filed at 4/19/2020 0959  Gross per 24 hour   Intake 1673.33 ml   Output 1400 ml   Net 273.33 ml       Laboratory  Recent Labs     04/17/20  1845 04/18/20  0057 04/19/20  0530   WBC 13.7* 12.5* 11.0*   RBC 3.22* 2.98* 3.04*   HEMOGLOBIN 8.3* 7.8* 8.0*   HEMATOCRIT 25.5* 23.4* 24.6*   MCV 79.2* 78.5* 80.9*   MCH 25.8* 26.2* 26.3*   MCHC 32.5* 33.3* 32.5*   RDW 41.9 41.2 43.9   PLATELETCT 306 309 302   MPV 9.7 9.6 9.8     Recent Labs     04/17/20  0059 04/18/20  0057 04/19/20  0530   SODIUM 128* 124* 128*   POTASSIUM 4.2 4.0 4.2   CHLORIDE 95* 90* 92*   CO2 20 22 24   GLUCOSE 227* 256* 260*   BUN 18 11 9   CREATININE 0.72 0.60 0.42*   CALCIUM 8.9 8.9 9.0                   Imaging       Assessment/Plan  * Diabetic foot ulcer ([facility])- (present on admission)  Assessment & Plan  Multiple wounds of RLE  Not healing despite oral antibiotics and wound VAC  Wound culture with pseudomonas and enterococcus    Vascular surgery following  S/P RLE angiogram on 4/13 showing aortoiliac occlusive disease  S/P right AKA on 4/15  Pathology findings consistent with OM  Mild bloody drainage from surgical site, although this improving.  No evidence of infection.    Dressing to be changed tomorrow to ensure further improvement of drainage and no hematoma development.   Discontinue zosyn.  Ortho surgery and LPS following.   Pain uncontrolled  Start oxycontin BID  Continue pain control IV and oral narcotics.    Continue neurontin.  PT/OT following    S/P AKA (above knee amputation), right ([facility])  Assessment & Plan  Secondary to OM  PT/OT following  Will need jose r lift at discharge.   Continue pain control     PAD (peripheral artery disease) ([facility])- (present on admission)  Assessment & Plan  S/P RLE angiogram showing PAD and aortoiliac occlusive disease  Vascular surgery and ortho following.   Right AKA 4/15.   Continue lipitor, aggrenox, and plavix.     Dysphagia as late effect of stroke- (present on admission)  Assessment & Plan  History of  SLP following  Continue modified diet.     Hyponatremia- (present on admission)  Assessment & Plan  Appears chronic on review of records  High urine sodium with low serum osmo  Improved with IVF  Follow bmp.     History of stroke- (present on admission)  Assessment & Plan  With residual left-sided weakness and dysphasia  Aggrenox, plavix, and lipitor.     HTN (hypertension)- (present on admission)  Assessment & Plan  Will monitor  continue on Norvasc, hydralazine, Imdur, atenolol, losartan    DM (diabetes mellitus) ([facility])- (present on admission)  Assessment & Plan  hema A1c is 7.1  Glucose poorly controlled.  Continue lantus 35 units every evening.   SSI  Attempt to maintain BG < 150 to promote healing.   accu checks are noted  Diabetic diet    Iron deficiency anemia- (present on admission)  Assessment & Plan  No evidence of active bleed  Iron 28  Continue ferrous sulfate and ascorbic acid.    Aspect of blood loss anemia secondary to surgery  Hgb stable.   Continue to follow cbc  Transfuse for hgb < 7.        VTE prophylaxis: scds.  Anticoagulation held secondary to anemia.

## 2020-04-20 ENCOUNTER — PATIENT OUTREACH (OUTPATIENT)
Dept: HEALTH INFORMATION MANAGEMENT | Facility: OTHER | Age: 65
End: 2020-04-20

## 2020-04-20 LAB
BASOPHILS # BLD AUTO: 0.4 % (ref 0–1.8)
BASOPHILS # BLD: 0.05 K/UL (ref 0–0.12)
EOSINOPHIL # BLD AUTO: 0.11 K/UL (ref 0–0.51)
EOSINOPHIL NFR BLD: 0.9 % (ref 0–6.9)
ERYTHROCYTE [DISTWIDTH] IN BLOOD BY AUTOMATED COUNT: 43.2 FL (ref 35.9–50)
GLUCOSE BLD-MCNC: 148 MG/DL (ref 65–99)
GLUCOSE BLD-MCNC: 168 MG/DL (ref 65–99)
GLUCOSE BLD-MCNC: 178 MG/DL (ref 65–99)
GLUCOSE BLD-MCNC: 181 MG/DL (ref 65–99)
HCT VFR BLD AUTO: 24.3 % (ref 42–52)
HGB BLD-MCNC: 8 G/DL (ref 14–18)
IMM GRANULOCYTES # BLD AUTO: 0.1 K/UL (ref 0–0.11)
IMM GRANULOCYTES NFR BLD AUTO: 0.8 % (ref 0–0.9)
LYMPHOCYTES # BLD AUTO: 2.6 K/UL (ref 1–4.8)
LYMPHOCYTES NFR BLD: 21.2 % (ref 22–41)
MCH RBC QN AUTO: 26.3 PG (ref 27–33)
MCHC RBC AUTO-ENTMCNC: 32.9 G/DL (ref 33.7–35.3)
MCV RBC AUTO: 79.9 FL (ref 81.4–97.8)
MONOCYTES # BLD AUTO: 1.14 K/UL (ref 0–0.85)
MONOCYTES NFR BLD AUTO: 9.3 % (ref 0–13.4)
NEUTROPHILS # BLD AUTO: 8.25 K/UL (ref 1.82–7.42)
NEUTROPHILS NFR BLD: 67.4 % (ref 44–72)
NRBC # BLD AUTO: 0 K/UL
NRBC BLD-RTO: 0 /100 WBC
PLATELET # BLD AUTO: 353 K/UL (ref 164–446)
PMV BLD AUTO: 9.6 FL (ref 9–12.9)
RBC # BLD AUTO: 3.04 M/UL (ref 4.7–6.1)
WBC # BLD AUTO: 12.3 K/UL (ref 4.8–10.8)

## 2020-04-20 PROCEDURE — 770001 HCHG ROOM/CARE - MED/SURG/GYN PRIV*

## 2020-04-20 PROCEDURE — 700102 HCHG RX REV CODE 250 W/ 637 OVERRIDE(OP): Performed by: NURSE PRACTITIONER

## 2020-04-20 PROCEDURE — 700111 HCHG RX REV CODE 636 W/ 250 OVERRIDE (IP): Performed by: NURSE PRACTITIONER

## 2020-04-20 PROCEDURE — 700102 HCHG RX REV CODE 250 W/ 637 OVERRIDE(OP): Performed by: INTERNAL MEDICINE

## 2020-04-20 PROCEDURE — A9270 NON-COVERED ITEM OR SERVICE: HCPCS | Performed by: FAMILY MEDICINE

## 2020-04-20 PROCEDURE — A9270 NON-COVERED ITEM OR SERVICE: HCPCS | Performed by: SURGERY

## 2020-04-20 PROCEDURE — A9270 NON-COVERED ITEM OR SERVICE: HCPCS | Performed by: INTERNAL MEDICINE

## 2020-04-20 PROCEDURE — A9270 NON-COVERED ITEM OR SERVICE: HCPCS | Performed by: NURSE PRACTITIONER

## 2020-04-20 PROCEDURE — A9270 NON-COVERED ITEM OR SERVICE: HCPCS | Performed by: PHYSICIAN ASSISTANT

## 2020-04-20 PROCEDURE — 36415 COLL VENOUS BLD VENIPUNCTURE: CPT

## 2020-04-20 PROCEDURE — 700102 HCHG RX REV CODE 250 W/ 637 OVERRIDE(OP): Performed by: FAMILY MEDICINE

## 2020-04-20 PROCEDURE — 99232 SBSQ HOSP IP/OBS MODERATE 35: CPT | Performed by: NURSE PRACTITIONER

## 2020-04-20 PROCEDURE — 99232 SBSQ HOSP IP/OBS MODERATE 35: CPT | Performed by: INTERNAL MEDICINE

## 2020-04-20 PROCEDURE — 82962 GLUCOSE BLOOD TEST: CPT | Mod: 91

## 2020-04-20 PROCEDURE — 700102 HCHG RX REV CODE 250 W/ 637 OVERRIDE(OP): Performed by: SURGERY

## 2020-04-20 PROCEDURE — 92526 ORAL FUNCTION THERAPY: CPT

## 2020-04-20 PROCEDURE — 85025 COMPLETE CBC W/AUTO DIFF WBC: CPT

## 2020-04-20 PROCEDURE — 700102 HCHG RX REV CODE 250 W/ 637 OVERRIDE(OP): Performed by: PHYSICIAN ASSISTANT

## 2020-04-20 RX ORDER — FERROUS SULFATE 325(65) MG
325 TABLET ORAL
Qty: 30 TAB | Refills: 0
Start: 2020-04-21

## 2020-04-20 RX ORDER — ACETAMINOPHEN 325 MG/1
650 TABLET ORAL EVERY 6 HOURS PRN
Qty: 30 TAB | Refills: 0 | Status: SHIPPED | OUTPATIENT
Start: 2020-04-20

## 2020-04-20 RX ORDER — DEXTROSE MONOHYDRATE 25 G/50ML
50 INJECTION, SOLUTION INTRAVENOUS
Status: DISCONTINUED | OUTPATIENT
Start: 2020-04-20 | End: 2020-04-21 | Stop reason: HOSPADM

## 2020-04-20 RX ORDER — OXYCODONE HCL 10 MG/1
10 TABLET, FILM COATED, EXTENDED RELEASE ORAL EVERY 12 HOURS
Qty: 6 TAB | Refills: 0
Start: 2020-04-20 | End: 2020-04-23

## 2020-04-20 RX ORDER — ASCORBIC ACID 500 MG
500 TABLET ORAL DAILY
Qty: 30 TAB | Refills: 3 | Status: SHIPPED | OUTPATIENT
Start: 2020-04-21

## 2020-04-20 RX ORDER — PREGABALIN 75 MG/1
75 CAPSULE ORAL 3 TIMES DAILY
Qty: 90 CAP | Refills: 0
Start: 2020-04-20 | End: 2020-05-20

## 2020-04-20 RX ORDER — INSULIN GLARGINE 100 [IU]/ML
40 INJECTION, SOLUTION SUBCUTANEOUS EVERY EVENING
Status: DISCONTINUED | OUTPATIENT
Start: 2020-04-20 | End: 2020-04-21 | Stop reason: HOSPADM

## 2020-04-20 RX ORDER — CLOPIDOGREL BISULFATE 75 MG/1
75 TABLET ORAL DAILY
Qty: 30 TAB | Refills: 0
Start: 2020-04-21

## 2020-04-20 RX ORDER — ATORVASTATIN CALCIUM 40 MG/1
40 TABLET, FILM COATED ORAL
Qty: 30 TAB | Refills: 0
Start: 2020-04-20

## 2020-04-20 RX ORDER — INSULIN GLARGINE 100 [IU]/ML
40 INJECTION, SOLUTION SUBCUTANEOUS EVERY EVENING
Qty: 10 ML | Refills: 0
Start: 2020-04-20

## 2020-04-20 RX ADMIN — ATENOLOL 100 MG: 50 TABLET ORAL at 05:10

## 2020-04-20 RX ADMIN — CLOPIDOGREL BISULFATE 75 MG: 75 TABLET ORAL at 05:10

## 2020-04-20 RX ADMIN — PREGABALIN 75 MG: 75 CAPSULE ORAL at 05:09

## 2020-04-20 RX ADMIN — OXYCODONE HYDROCHLORIDE 10 MG: 10 TABLET, FILM COATED, EXTENDED RELEASE ORAL at 18:17

## 2020-04-20 RX ADMIN — INSULIN LISPRO 3 UNITS: 100 INJECTION, SOLUTION INTRAVENOUS; SUBCUTANEOUS at 12:49

## 2020-04-20 RX ADMIN — NYSTATIN: 100000 POWDER TOPICAL at 05:10

## 2020-04-20 RX ADMIN — INSULIN GLARGINE 40 UNITS: 100 INJECTION, SOLUTION SUBCUTANEOUS at 18:13

## 2020-04-20 RX ADMIN — OXYCODONE HYDROCHLORIDE 15 MG: 10 TABLET ORAL at 20:49

## 2020-04-20 RX ADMIN — NYSTATIN: 100000 POWDER TOPICAL at 18:19

## 2020-04-20 RX ADMIN — ASPIRIN AND DIPYRIDAMOLE 1 CAPSULE: 25; 200 CAPSULE, EXTENDED RELEASE ORAL at 05:19

## 2020-04-20 RX ADMIN — SPIRONOLACTONE 50 MG: 50 TABLET ORAL at 18:17

## 2020-04-20 RX ADMIN — LOSARTAN POTASSIUM 50 MG: 50 TABLET, FILM COATED ORAL at 05:10

## 2020-04-20 RX ADMIN — INSULIN LISPRO 3 UNITS: 100 INJECTION, SOLUTION INTRAVENOUS; SUBCUTANEOUS at 08:24

## 2020-04-20 RX ADMIN — SPIRONOLACTONE 50 MG: 50 TABLET ORAL at 05:10

## 2020-04-20 RX ADMIN — AMITRIPTYLINE HYDROCHLORIDE 50 MG: 25 TABLET, FILM COATED ORAL at 20:50

## 2020-04-20 RX ADMIN — SENNOSIDES AND DOCUSATE SODIUM 2 TABLET: 8.6; 5 TABLET ORAL at 18:18

## 2020-04-20 RX ADMIN — AMLODIPINE BESYLATE 10 MG: 10 TABLET ORAL at 05:10

## 2020-04-20 RX ADMIN — ISOSORBIDE MONONITRATE 30 MG: 30 TABLET, EXTENDED RELEASE ORAL at 18:17

## 2020-04-20 RX ADMIN — FERROUS SULFATE TAB 325 MG (65 MG ELEMENTAL FE) 325 MG: 325 (65 FE) TAB at 08:25

## 2020-04-20 RX ADMIN — Medication 500 MG: at 05:09

## 2020-04-20 RX ADMIN — OMEPRAZOLE 20 MG: 20 CAPSULE, DELAYED RELEASE ORAL at 05:09

## 2020-04-20 RX ADMIN — MORPHINE SULFATE 4 MG: 4 INJECTION INTRAVENOUS at 09:42

## 2020-04-20 RX ADMIN — PREGABALIN 75 MG: 75 CAPSULE ORAL at 18:17

## 2020-04-20 RX ADMIN — MAGNESIUM HYDROXIDE 30 ML: 400 SUSPENSION ORAL at 05:10

## 2020-04-20 RX ADMIN — OXYCODONE HYDROCHLORIDE 10 MG: 10 TABLET, FILM COATED, EXTENDED RELEASE ORAL at 05:10

## 2020-04-20 RX ADMIN — HYDRALAZINE HYDROCHLORIDE 100 MG: 25 TABLET, FILM COATED ORAL at 18:18

## 2020-04-20 RX ADMIN — ATORVASTATIN CALCIUM 40 MG: 40 TABLET, FILM COATED ORAL at 20:50

## 2020-04-20 RX ADMIN — SENNOSIDES AND DOCUSATE SODIUM 2 TABLET: 8.6; 5 TABLET ORAL at 05:10

## 2020-04-20 RX ADMIN — ISOSORBIDE MONONITRATE 30 MG: 30 TABLET, EXTENDED RELEASE ORAL at 05:09

## 2020-04-20 RX ADMIN — ASPIRIN AND DIPYRIDAMOLE 1 CAPSULE: 25; 200 CAPSULE, EXTENDED RELEASE ORAL at 18:32

## 2020-04-20 RX ADMIN — PREGABALIN 75 MG: 75 CAPSULE ORAL at 12:49

## 2020-04-20 RX ADMIN — OXYCODONE HYDROCHLORIDE 15 MG: 10 TABLET ORAL at 08:27

## 2020-04-20 RX ADMIN — HYDRALAZINE HYDROCHLORIDE 100 MG: 25 TABLET, FILM COATED ORAL at 05:09

## 2020-04-20 ASSESSMENT — ENCOUNTER SYMPTOMS
SORE THROAT: 0
FEVER: 0
BACK PAIN: 0
DIZZINESS: 0
HEADACHES: 0
MYALGIAS: 1
ABDOMINAL PAIN: 0
VOMITING: 0
CONSTIPATION: 0
DOUBLE VISION: 0
FOCAL WEAKNESS: 1
DIARRHEA: 0
SHORTNESS OF BREATH: 0
CHILLS: 0
NAUSEA: 0
SENSORY CHANGE: 1
BLURRED VISION: 0

## 2020-04-20 NOTE — PROGRESS NOTES
Left AKA is healed with new mepilex in place; changed per policy     Right BKA dressing changed    Site is slightly edematous, red, blood clotting on the suture lines   Cleansed with NS, adaptic placed over suture lines, abd pad on top, dry roll gauze + ACE wrap applied     Sacrum is red/blanching/fragile; barrier cream applied     New bilateral elbow mepilex in place; changed per policy     Condom catheter in place for urinary incontinence     Skin under the pannus, scrotum, and between thighs assessed; red and excoriated w/ small skin tears    Skin cleansed w/ soap + water     Waffle mattress in use, Q2 turns in place, wound care following

## 2020-04-20 NOTE — CARE PLAN
Problem: Knowledge Deficit  Goal: Knowledge of disease process/condition, treatment plan, diagnostic tests, and medications will improve  Outcome: PROGRESSING AS EXPECTED  Discussion regarding skin integrity/turning discussed/encouraged. Verbalized understanding.     Problem: Pain Management  Goal: Pain level will decrease to patient's comfort goal  Outcome: PROGRESSING AS EXPECTED  Medicated for pain; see MAR. Improved pain control/reports with change in formulary.     Problem: Skin Integrity  Goal: Risk for impaired skin integrity will decrease  Outcome: PROGRESSING AS EXPECTED  Multiple skin protection interventions in place/utilized. Patient complying with turning this shift.     Problem: Psychosocial Needs:  Goal: Level of anxiety will decrease  Outcome: PROGRESSING AS EXPECTED  Improved mood noted with improved pain control.

## 2020-04-20 NOTE — PROGRESS NOTES
Assumed care at 0700    Received report from NOC shift RN.    Reviewed recent lab results, notes, orders, and MAR  POC discussed and updated on care board  Bed is in the lowest and locked position, call light within reach      A&Ox4  C/o pain medicated per MAR   Dressing on right AKA changed this AM   IV morphine given  Site is well approximated with stitches   Little to no drainage   Adaptic placed, ABD pad and Kerlix and ace wrap    Per limb preservation   RA  Tolerating diet  Q2 turns, waffle mattress, pillows used for repositioning  Patient has a condom cath due to incontinence    LBM 4/16/2020  Previous CVA with left sided weakness

## 2020-04-20 NOTE — DISCHARGE PLANNING
This RN MANISH spoke with Adarsh of [facility] and per Adarsh fax Discharge Summary to [facility] [phone].    13:00 This RN MANISH faxed Pt's Discharge Summary and LPS notes to [facility], to Rachel GOODEN at [facility] [phone]. Per Rachel, Dr Us still has to review these medical Records first    13:05 Per Adarsh, once Dr Us is done with the Review, they will call me back to inform me of Pt's transport and exact time. Pt is discharging to [facility] [address] [facility] [place].  Informed GIACOMO Dunbar of this update.    1515 Spoke with Rachel GOODEN again and per Rachel, Dr Us has not reviewed Pt's Discharge Summary and LPS notes yet. GIACOMO Ramos to call me before 16:00 to confirm if Pt is discharging today. Per Rachel the plan is Pt will be transported using the [facility]'s  [facility].     15:55 This GIACOMO HAMMOND spoke with GIACOMO Aquino at the [facility] who states that Dr Us has not reviewed Pt's Discharge Summary yet. Informed GIACOMO Dunbar that we will discharge Pt tomorrow. Informed SARAH Canela via tiger text.

## 2020-04-20 NOTE — PROGRESS NOTES
LIMB PRESERVATION SERVICE  PROGRESS NOTE    HPI: 64 y.o.  with a past medical history that includes type 2 diabetes, CVA, obesity, left above-the-knee amputation (2017) admitted 4/9/2020 for DM ulcers RLE leg and foot.     LPS has been consulted for evaluation of diabetic ulcers to right foot and lower extremity.  The patient reports that he has had ulcers to his right foot and right lower leg for 3 weeks (3/23/2020) for which he has been managing with wound VAC and antibiotics.  His wounds have not improved despite these interventions.  Documentation show that his wound culture was growing Pseudomonas and enterococcus and he was treated with unknown antibiotics.  Arterial studies at the correctional facility reported to have been done but were not transferred.  Vascular surgery was consulted, completed repeat arterial studies which showed poor arterial blood flow to the right lower extremity.  Vascular surgery recommended angiogram before surgical intervention. The patient reports pain around his ulcers, surrounding erythema, drainage for an unknown duration of time.  The patient denies fever, chills, nausea, vomiting, chest pain, shortness of breath, headache.      PROCEDURE DATE: 4/13/20 by Dr. Dennis  PROCEDURE: SFA angioplasty      PROCEDURE DATE: 4/15/2020 by Dr. Hairston  PROCEDURE: Right above-knee amputation    4/14/2020: Patient denies fevers, chills, nausea, vomiting.  Pain controlled except when right heel is palpated. Unfortunately does not have adequate blood flow to heal ulcers. Angioplasty was successful to ensure adequate flow to heal amputation per vascular surgery. Planning for BKA vs AKA to RLE, pt undecided.   4/17/2020: Patient is POD#2 right AKA with Dr. Hairston. Large amount of sanguinous drainage to post-operative dressing,distal inferior incision oozes sanguinous drainage. Patient reports pain to medial side of right AKA that is tolerable with occasional breakthrough pain.  Patient  "denies fever, chills, n/v, CP, SOB.   4/18/2020: Patient is POD #3 right AKA with .  Patient assessed with Terry SMITH and Zion BRAVO at bedside.  Correctional facility guards x2 at bedside. small amount of sanguinous drainage to dressing.  Scant amount of sanguinous oozing with palpation of distal inferior incision.  Patient reports pain and voiced frustration with not having pain medication when he wants it.  I have reiterated that analgesics are scheduled as has nursing.  Denies fever, chills, nausea, vomiting, diarrhea, chest pain, shortness of breath.  4/20/2020: Patient is POD #5 right AKA with .  Patient assessed with Terry SMITH and Bettina mathis RN.  Correctional facility guards x2 at bedside.  Minimal amount of sanguinous drainage in dressing.  Pain is better controlled.  Denies fever, chills, chest pain, shortness of breath.    EXAM:      /55   Pulse 73   Temp 36.1 °C (96.9 °F) (Temporal)   Resp 18   Ht 1.708 m (5' 7.24\")   Wt 90.1 kg (198 lb 10.2 oz)   SpO2 96%   BMI 30.89 kg/m²     Pedal Pulses: Palpable right femoral pulse  Sensation: sensation intact to right AKA stump      Wound :  Minimal amount of sanguinous drainage to right AKA dressing.  Incision is well approximated with sutures, no maceration or incisional necrosis.  Central distal inferior incision has small area of ecchymosis approximately 2 cm x 2 cm.  No fluctuance or expression of drainage from incision with palpation.  No surrounding erythema, odor.    Left AKA stump well approximated and protected with Mepilex      Right AKA        Wound Care:   Continue single-layer adaptic to incision for non-adhesion, abd pad for absorbency, roll gauze for padding and securement. Ace wrap x2 for compression.  Continue to protect left AKA with mepilex    DIABETES MANAGEMENT:    Blood glucose:   Results from last 7 days   Lab Units 04/20/20  0820 04/19/20  2135 04/19/20  1710 04/19/20  1134 04/19/20  0815 " 04/18/20  2210 04/18/20  1750 04/18/20  1124   ACCU CHECK GLUCOSE 788 mg/dL 181* 205* 243* 209* 231* 219* 169* 229*     A1c:   Lab Results   Component Value Date/Time    HBA1C 7.1 (H) 04/10/2020 04:25 AM          INFECTION MANAGEMENT:    Results from last 7 days   Lab Units 04/20/20  0021 04/19/20  0530 04/18/20  0057 04/17/20  1845 04/17/20  1215 04/17/20  0059   WBC 1501 K/uL 12.3* 11.0* 12.5* 13.7* 11.6* 12.4*   PLATELET COUNT 1518 K/uL 353 302 309 306 262 289     Wound culture results:   Results     ** No results found for the last 168 hours. **         Surgical pathology, right leg, 4/15/2020    FINAL DIAGNOSIS:    A. Right leg:         Above the knee amputation showing skin ulceration and underlying          abscess formation and soft tissue necrosis.         Positive for acute osteomyelitis.         Osteomyelitis is not seen at the otilia margin.      ASSESSMENT/PLAN:   Drainage continues to decrease.  There is minimal amount on dressing today.  Oozing from incision has resolved.  Area of ecchymosis on the central distal inferior aspect of the incision present, there is no fluctuance or manual expression of drainage.  No signs infection wound should continue to heal with wound care and compression.      Wound care:   -Wound care orders for nursing in place.  No changes.  Continue single-layer adaptic to incision for non-adhesion, abd pad for absorbency, roll gauze for padding and securement. Ace wrap x2 for compression.  Change daily.    Labs/Imaging:  -no further labs or imaging at this time    Vascular status:   -S/P right SFA angioplasty by Dr. Dennis on 4/13/2020.  Palpable right femoral pulse.    Surgery:   -none at this time    Antibiotics:   -None at this time, Zosyn discontinued by hospitalist    Weight Bearing Status:   -Non Weight bearing    Offloading:   Elevate right AKA.    PT/OT :   Seen 4/16/2020      Diabetes Education:   -involved, last seen 4/13/20  - Implications of loss of protective  sensation (LOPS) discussed with patient- including increased risk for amputation.  Advised to check feet at least daily, moisturize feet, and to always wear protective foot wear.   -avoid trimming own nails. See podiatrist or certified foot and nail RN  -keep blood sugars <150 for improved wound healing        DISCHARGE PLAN:    Disposition: return to [facility] with f/u with ortho 3 weeks post op on discharge.  LPS to continue to follow while patient remains inpatient.    Discussed with: patient, Bettina mathis RN, [facility] hospitalist SARAH    Please note that this dictation was created using voice recognition software. I have  worked with technical experts from [employer] to optimize the interface.  I have made every reasonable attempt to correct obvious errors, but there may be errors of grammar and possibly content that I did not discover before finalizing the note.    Gena Muñoz, A.P.R.N.    If any questions or concerns, please call [phone]

## 2020-04-20 NOTE — CARE PLAN
Problem: Communication  Goal: The ability to communicate needs accurately and effectively will improve  Outcome: PROGRESSING AS EXPECTED   Participation in POC  Problem: Safety  Goal: Will remain free from injury  Outcome: PROGRESSING AS EXPECTED   Fall precautions in place  Problem: Pain Management  Goal: Pain level will decrease to patient's comfort goal  Outcome: PROGRESSING AS EXPECTED   Pain is well controlled with medication per MAR  Problem: Skin Integrity  Goal: Risk for impaired skin integrity will decrease  Outcome: PROGRESSING AS EXPECTED   Q2 turns, waffle mattress, barrier cream

## 2020-04-20 NOTE — PROGRESS NOTES
[facility] Medicine Daily Progress Note    Date of Service  4/20/2020    Chief Complaint  64 y.o. male admitted 4/9/2020 with Leg wound       Hospital Course    64 y.o. male who presented 4/9/2020 with stroke, DM, HTN who presented from correctional [facility] with right diabetic foot ulcers that have been not healing despite antibiotics and wound Vac.  His documents show that his wound culture was growing Pseudomonas and enterococcus and he was treated with oral antibiotics.  On admission Dr. Lebron was consulted.  He underwent RLE angiogram on 4/13 showing aortoiliac occlusive disease.  He is pending AKA vs BKA.  Ortho has been consulted.  Continue IV antibiotics and tight glycemic control.    Interval Problem Update  4/14:  Pending AKA vs BKA tomorrow.  This is to be determined per patient and surgery team.  Sodium noted be 126.  He does have a history of hyponatremia which appears to be chronic.  Workup ordered.  He does have pain to RLE.  Denies shortness of breath, nausea, or vomiting.  VSS.  Afebrile.  4/15:  S/P AKA.  Discontinue antibiotics.  Does endorse pain to surgical site.  Surgical dressing in place.  Sodium 126.  Hold IVF and monitor.     4/16:  POD #1.  Pain uncontrolled.  Medications adjusted.  Bleeding from surgical site overnight, although resolved with reinforcement.  Sodium 125.  Continue fluid restriction.  Repeat BMP.  Afebrile.  VSS.   4/17:  Hgb 7.4 this am.  No evidence of active bleed.  Suspect aspect of blood loss anemia from recent surgery.  Hemodynamically stable.  Trend hgb.  More somnolent this am.  Will de-escalate pain meds if this persists.  Awaiting first dressing change per ortho surgery.   4/18:  Dressing changed at bedside.  Small amount of bloody drainage noted.  No evidence of infection.  Will change dressing in 2 days to ensure further improvement of drainage.  Continues to be aggressive with pain medication.  Attempt conservative pain management as tolerated.  Denies fever,  chills, or shortness of breath.  4/19:  Pain to surgical site uncontrolled. Start scheduled oxycontin.  No drainage noted to surgical dressing.  Sodium improved with IVF.  VSS.   4/20:  Dressing changed today.  Very minimal serosanguinous drainage from wound.  No evidence of infection.  Sutures intact.  Pain better controlled on current regimen.  Glucose continues to be uncontrolled.  Insulin adjusted.     Consultants/Specialty  Vascular surgery  Ortho surgery.     Code Status  full    Disposition  Anticipate transfer back to [facility] with jose r lift when medically clear.      Review of Systems  Review of Systems   Constitutional: Negative for chills, fever and malaise/fatigue.   HENT: Negative for congestion and sore throat.    Eyes: Negative for blurred vision and double vision.   Respiratory: Negative for shortness of breath.    Cardiovascular: Negative for chest pain and leg swelling.   Gastrointestinal: Negative for abdominal pain, constipation, diarrhea, nausea and vomiting.   Genitourinary: Negative for dysuria and hematuria.   Musculoskeletal: Positive for joint pain and myalgias. Negative for back pain.   Skin: Negative for itching and rash.   Neurological: Positive for sensory change (Phantom limb pain) and focal weakness (chronic left sided weakness secondary to h/o cva. ). Negative for dizziness and headaches.        Physical Exam  Temp:  [36.1 °C (96.9 °F)-37.4 °C (99.3 °F)] 36.1 °C (96.9 °F)  Pulse:  [65-86] 73  Resp:  [18-19] 18  BP: (109-142)/(55-68) 114/55  SpO2:  [93 %-96 %] 96 %    Physical Exam  Constitutional:       General: He is not in acute distress.     Appearance: He is not ill-appearing.      Comments: Chronically-ill appearing male.    HENT:      Head: Normocephalic and atraumatic.      Nose: Nose normal. No congestion.      Mouth/Throat:      Mouth: Mucous membranes are dry.      Pharynx: Oropharynx is clear. No oropharyngeal exudate.   Eyes:      General:         Right eye: No discharge.          Left eye: No discharge.      Conjunctiva/sclera: Conjunctivae normal.   Neck:      Musculoskeletal: Normal range of motion and neck supple. No neck rigidity or muscular tenderness.   Cardiovascular:      Rate and Rhythm: Normal rate and regular rhythm.      Heart sounds: Normal heart sounds. No murmur.   Pulmonary:      Effort: Pulmonary effort is normal. No respiratory distress.      Breath sounds: Normal breath sounds. No wheezing or rales.   Abdominal:      General: There is no distension.      Palpations: Abdomen is soft.      Tenderness: There is no abdominal tenderness.   Musculoskeletal: Normal range of motion.         General: Tenderness, deformity and signs of injury present. No swelling.      Comments: Eschar and ulcers are noted on the  Lateral side of the right foot  Left AKA.  S/P right AKA on 4/15.  Surgical dressing in place.    Skin:     General: Skin is warm and dry.      Coloration: Skin is pale. Skin is not jaundiced.   Neurological:      Mental Status: He is alert and oriented to person, place, and time. Mental status is at baseline.      Sensory: Sensory deficit present.      Comments: Chronic left hemiparesis and dysphagia secondary to history of CVA.    Psychiatric:         Mood and Affect: Mood normal.         Fluids    Intake/Output Summary (Last 24 hours) at 4/20/2020 0954  Last data filed at 4/20/2020 0800  Gross per 24 hour   Intake 720 ml   Output 1400 ml   Net -680 ml       Laboratory  Recent Labs     04/18/20 0057 04/19/20  0530 04/20/20  0021   WBC 12.5* 11.0* 12.3*   RBC 2.98* 3.04* 3.04*   HEMOGLOBIN 7.8* 8.0* 8.0*   HEMATOCRIT 23.4* 24.6* 24.3*   MCV 78.5* 80.9* 79.9*   MCH 26.2* 26.3* 26.3*   MCHC 33.3* 32.5* 32.9*   RDW 41.2 43.9 43.2   PLATELETCT 309 302 353   MPV 9.6 9.8 9.6     Recent Labs     04/18/20 0057 04/19/20  0530   SODIUM 124* 128*   POTASSIUM 4.0 4.2   CHLORIDE 90* 92*   CO2 22 24   GLUCOSE 256* 260*   BUN 11 9   CREATININE 0.60 0.42*   CALCIUM 8.9 9.0                    Imaging       Assessment/Plan  * Diabetic foot ulcer (HCC)- (present on admission)  Assessment & Plan  Multiple wounds of RLE  Not healing despite oral antibiotics and wound VAC  Wound culture with pseudomonas and enterococcus   Vascular surgery following  S/P RLE angiogram on 4/13 showing aortoiliac occlusive disease  S/P right AKA on 4/15  Pathology findings consistent with OM  Discontinue zosyn.  Ortho surgery and LPS following.   Wound care.   Continue oxycontin and neurontin   Continue pain control IV and oral narcotics.    PT/OT following    S/P AKA (above knee amputation), right (HCC)  Assessment & Plan  Secondary to OM  PT/OT following  Will need jose r lift at discharge.   Continue pain control     PAD (peripheral artery disease) ([facility])- (present on admission)  Assessment & Plan  S/P RLE angiogram showing PAD and aortoiliac occlusive disease  Vascular surgery and ortho following.   Right AKA 4/15.   Continue lipitor, aggrenox, and plavix.     Dysphagia as late effect of stroke- (present on admission)  Assessment & Plan  History of  SLP following  Continue modified diet.     Hyponatremia- (present on admission)  Assessment & Plan  Appears chronic on review of records  High urine sodium with low serum osmo  Improved with IVF  Follow bmp.     History of stroke- (present on admission)  Assessment & Plan  With residual left-sided weakness and dysphasia  Aggrenox, plavix, and lipitor.     HTN (hypertension)- (present on admission)  Assessment & Plan  Will monitor  continue on Norvasc, hydralazine, Imdur, atenolol, losartan    DM (diabetes mellitus) ([facility])- (present on admission)  Assessment & Plan  hema A1c is 7.1  Glucose poorly controlled.  Increase lantus to 40 units every evening  Start prandial insulin  SSI  Attempt to maintain BG < 150 to promote healing.   accu checks are noted  Diabetic diet    Iron deficiency anemia- (present on admission)  Assessment & Plan  No evidence of active bleed  Iron  28  Continue ferrous sulfate and ascorbic acid.   Aspect of blood loss anemia secondary to surgery  Hgb stable.   Continue to follow cbc  Transfuse for hgb < 7.        VTE prophylaxis:  Bilateral AKAs.

## 2020-04-20 NOTE — DISCHARGE SUMMARY
Discharge Summary    CHIEF COMPLAINT ON ADMISSION  No chief complaint on file.      Reason for Admission  DM ulcers RLE leg and foot     Admission Date  4/9/2020    CODE STATUS  Full Code    HPI & HOSPITAL COURSE  This is a 64 y.o. male prisoner with a past medical history of stroke, diabetes, hypertension, left AKA here with worsening of right diabetic foot ulcers that has failed treatment with antibiotics and wound VAC therapy.  Records indicate a history of wound cultures positive for Pseudomonas and enterococcus, which he was initiated on antibiotic therapy in treatment for.  His wound has worsened despite these measures.    On admission the patient was initiated on IV antibiotics.  He underwent extensive arterial evaluation by the vascular surgery team and ultimately underwent right lower extremity angiogram with SFA angioplasty on 4/13/2020 with findings of severe aortoiliac occlusive disease.  Due to the severity of his arterial disease and low probability of wound healing, the patient underwent right above-knee amputation by Dr. Hairston on 4/15/2020.  The patient tolerated the procedure well.  His antibiotics were discontinued.  He remained in the hospital receiving wound care and pain management.  His diabetes regimen has been adjusted for better glucose control to enhance healing.    At this time the patient's surgical wound appears to be healing appropriately.  There is no evidence of further infection.  He has worked with physical and occupational therapy with no further acute therapy needs.  He is recommended to have Lisa lift on return to long term for transfers.  His diabetes should continue to be closely managed by the physician at the long term.  He is currently medically stable and will need to follow-up with orthopedic surgery in 3 weeks for postop follow-up.      Therefore, he is discharged in good and stable condition to long term.    The patient met 2-midnight criteria for an inpatient stay at the  time of discharge.    Discharge Date  4/20/2020    FOLLOW UP ITEMS POST DISCHARGE  -Continue tight glucose control to attempt to maintain glucose less than 150.  -Recommend Lisa lift for transfers.  -Follow with orthopedic surgery in 3 weeks.    DISCHARGE DIAGNOSES  Principal Problem:    Diabetic foot ulcer (HCC) POA: Yes  Active Problems:    PAD (peripheral artery disease) (HCC) POA: Yes    S/P AKA (above knee amputation), right (HCC) POA: Unknown    DM (diabetes mellitus) (HCC) POA: Yes    HTN (hypertension) POA: Yes    History of stroke POA: Yes    Hyponatremia POA: Yes    Dysphagia as late effect of stroke POA: Yes    Iron deficiency anemia POA: Yes  Resolved Problems:    * No resolved hospital problems. *      FOLLOW UP  [facility] with [facility] on [facility].    Fredy Hairston M.D.  [address] [place] [address]  [phone]    Call in 3 weeks        MEDICATIONS ON DISCHARGE     Medication List      START taking these medications      Instructions   acetaminophen 325 MG Tabs  Commonly known as:  TYLENOL   Take 2 Tabs by mouth every 6 hours as needed (Mild Pain; (Pain scale 1-3); Temp greater than 100.5 F).  Dose:  650 mg     ascorbic acid 500 MG tablet  Start taking on:  April 21, 2020  Commonly known as:  VITAMIN C   Take 1 Tab by mouth every day.  Dose:  500 mg     atorvastatin 40 MG Tabs  Commonly known as:  LIPITOR   Take 1 Tab by mouth every bedtime.  Dose:  40 mg     clopidogrel 75 MG Tabs  Start taking on:  April 21, 2020  Commonly known as:  PLAVIX   Take 1 Tab by mouth every day.  Dose:  75 mg     ferrous sulfate 325 (65 Fe) MG tablet  Start taking on:  April 21, 2020   Take 1 Tab by mouth every morning with breakfast.  Dose:  325 mg     insulin glargine 100 UNIT/ML Soln  Commonly known as:  LANTUS   Inject 40 Units as instructed every evening.  Dose:  40 Units     insulin lispro 100 UNIT/ML  Commonly known as:  HUMALOG   Inject 10 Units as instructed 3 times a day before meals.  Dose:   10 Units     pregabalin 75 MG Caps  Commonly known as:  LYRICA   Take 1 Cap by mouth 3 times a day for 30 days.  Dose:  75 mg        CHANGE how you take these medications      Instructions   * oxycodone 15 MG immediate release tablet  What changed:  Another medication with the same name was added. Make sure you understand how and when to take each.  Commonly known as:  OXY-IR   Take 15 mg by mouth every four hours as needed for Severe Pain.  Dose:  15 mg     * oxyCODONE CR 10 MG T12a  What changed:  You were already taking a medication with the same name, and this prescription was added. Make sure you understand how and when to take each.  Commonly known as:  OXYCONTIN   Take 1 Tab by mouth every 12 hours for 3 days.  Dose:  10 mg         * This list has 2 medication(s) that are the same as other medications prescribed for you. Read the directions carefully, and ask your doctor or other care provider to review them with you.            CONTINUE taking these medications      Instructions   amitriptyline 50 MG Tabs  Commonly known as:  ELAVIL   Take 50 mg by mouth every evening.  Dose:  50 mg     amLODIPine 5 MG Tabs  Commonly known as:  NORVASC   Take 10 mg by mouth every day.  Dose:  10 mg     aspirin-dipyridamole  MG Cp12  Commonly known as:  AGGRENOX   Take 1 Cap by mouth 2 times a day.  Dose:  1 Cap     atenolol 100 MG Tabs  Commonly known as:  TENORMIN   Take 100 mg by mouth every day.  Dose:  100 mg     glipiZIDE 10 MG Tabs  Commonly known as:  GLUCOTROL   Take 10 mg by mouth 2 times a day.  Dose:  10 mg     hydrALAZINE 50 MG Tabs  Commonly known as:  APRESOLINE   Take 100 mg by mouth 2 Times a Day.  Dose:  100 mg     insulin regular 100 Unit/mL Soln  Commonly known as:  HUMULIN R   Inject 2-10 Units as instructed 2 times a day as needed for High Blood Sugar.  Dose:  2-10 Units     isosorbide mononitrate SR 30 MG Tb24  Commonly known as:  IMDUR   Take 30 mg by mouth 2 Times a Day.  Dose:  30 mg        losartan 50 MG Tabs  Commonly known as:  COZAAR   Take 50 mg by mouth every day.  Dose:  50 mg     Nuedexta 20-10 MG Caps  Generic drug:  Dextromethorphan-quiNIDine   Take 1 Tab by mouth 2 Times a Day.  Dose:  1 Tab     omeprazole 20 MG tablet  Commonly known as:  PRILOSEC OTC   Take 20 mg by mouth every day.  Dose:  20 mg     spironolactone 50 MG Tabs  Commonly known as:  ALDACTONE   Take 50 mg by mouth 2 times a day.  Dose:  50 mg        STOP taking these medications    insulin  UNIT/ML Susp  Commonly known as:  HUMULIN/NOVOLIN     lovastatin 40 MG tablet  Commonly known as:  MEVACOR            Allergies  No Known Allergies    DIET  Orders Placed This Encounter   Procedures   • Diet Order Diabetic     Standing Status:   Standing     Number of Occurrences:   1     Order Specific Question:   Diet:     Answer:   Diabetic [3]     Order Specific Question:   Calorie modifications:     Answer:   2000 kcals [5]     Order Specific Question:   Texture Modifier     Answer:   Level 5 - Minced & Moist (Dysphagia 2)     Order Specific Question:   Liquid level     Answer:   Level 2 - Mildly Thick     Order Specific Question:   Consistency/Fluid modifications:     Answer:   1000 ml Fluid Restriction [7]       ACTIVITY  As tolerated.  Weight bearing as tolerated    CONSULTATIONS  Orthopedic Surgery - Dr. Hairston  Vascular Surgery - Dr. Lebron.    PROCEDURES  4/13/2020:  Right lower extremity angiogram with SFA angioplasty    4/15/2020:   Right above-knee amputation    LABORATORY  Lab Results   Component Value Date    SODIUM 128 (L) 04/19/2020    POTASSIUM 4.2 04/19/2020    CHLORIDE 92 (L) 04/19/2020    CO2 24 04/19/2020    GLUCOSE 260 (H) 04/19/2020    BUN 9 04/19/2020    CREATININE 0.42 (L) 04/19/2020        Lab Results   Component Value Date    WBC 12.3 (H) 04/20/2020    HEMOGLOBIN 8.0 (L) 04/20/2020    HEMATOCRIT 24.3 (L) 04/20/2020    PLATELETCT 353 04/20/2020        Total time of the discharge process was 35  minutes.

## 2020-04-20 NOTE — PROGRESS NOTES
Pt A+Ox4   Complains of 5/10 pain; wishes to wait for available pain medication     Tolerating a level 5 minced 7 moist diet w/ level 2 mildly thick liquids   Thin liquids between meals   1200mL fluid restriction in place   Denies N/V/D     Condom catheter in place for urinary incontinence     Waffle cushion in place   Sacrum is red/fragile + open skin; moist mepilex removed- barrier paste placed   Pannus and left underarm are red/irriatted; skin cleaned + dried appropriately   Bilateral elbow mepilex changed per policy   Left stump mepilex changed per policy     Right stump dressing changed   Site is slightly edematous, red, blood clotting on the suture lines   Cleansed with NS, adaptic placed over suture lines, abd pad on top, dry roll gauze + ACE wrap applied   Pt tolerated poorly w/ continual complaints of pain; medicated per MAR      Q2 turns in place, bed locked and in lowest position, call light within reach, 2x upper bed rails raised   2 guards present   Hourly rounding in place

## 2020-04-20 NOTE — DISCHARGE PLANNING
10:50 this GIACOMO HAMMOND spoke with GIACOMO Ramos of [facility] at Tel [phone]. Per Aliya, the jose r lift for Pt's transfer is already available. Will call [facility] again for Pt's transport once Pt is medically cleared for discharge.

## 2020-04-21 VITALS
RESPIRATION RATE: 19 BRPM | DIASTOLIC BLOOD PRESSURE: 52 MMHG | WEIGHT: 198.63 LBS | TEMPERATURE: 97.7 F | HEIGHT: 67 IN | BODY MASS INDEX: 31.18 KG/M2 | OXYGEN SATURATION: 97 % | HEART RATE: 70 BPM | SYSTOLIC BLOOD PRESSURE: 102 MMHG

## 2020-04-21 PROBLEM — E87.1 HYPONATREMIA: Status: RESOLVED | Noted: 2017-09-07 | Resolved: 2020-04-21

## 2020-04-21 PROBLEM — R33.9 URINARY RETENTION: Status: ACTIVE | Noted: 2020-04-21

## 2020-04-21 PROBLEM — L97.509 DIABETIC FOOT ULCER (HCC): Status: RESOLVED | Noted: 2017-09-07 | Resolved: 2020-04-21

## 2020-04-21 PROBLEM — E11.621 DIABETIC FOOT ULCER (HCC): Status: RESOLVED | Noted: 2017-09-07 | Resolved: 2020-04-21

## 2020-04-21 LAB
ANION GAP SERPL CALC-SCNC: 12 MMOL/L (ref 7–16)
BASOPHILS # BLD AUTO: 0.5 % (ref 0–1.8)
BASOPHILS # BLD: 0.07 K/UL (ref 0–0.12)
BUN SERPL-MCNC: 16 MG/DL (ref 8–22)
CALCIUM SERPL-MCNC: 9.1 MG/DL (ref 8.5–10.5)
CHLORIDE SERPL-SCNC: 90 MMOL/L (ref 96–112)
CO2 SERPL-SCNC: 23 MMOL/L (ref 20–33)
CREAT SERPL-MCNC: 0.52 MG/DL (ref 0.5–1.4)
EOSINOPHIL # BLD AUTO: 0.14 K/UL (ref 0–0.51)
EOSINOPHIL NFR BLD: 1 % (ref 0–6.9)
ERYTHROCYTE [DISTWIDTH] IN BLOOD BY AUTOMATED COUNT: 46.8 FL (ref 35.9–50)
GLUCOSE BLD-MCNC: 148 MG/DL (ref 65–99)
GLUCOSE SERPL-MCNC: 100 MG/DL (ref 65–99)
HCT VFR BLD AUTO: 26.7 % (ref 42–52)
HGB BLD-MCNC: 8.5 G/DL (ref 14–18)
IMM GRANULOCYTES # BLD AUTO: 0.08 K/UL (ref 0–0.11)
IMM GRANULOCYTES NFR BLD AUTO: 0.6 % (ref 0–0.9)
LYMPHOCYTES # BLD AUTO: 3.12 K/UL (ref 1–4.8)
LYMPHOCYTES NFR BLD: 23.1 % (ref 22–41)
MCH RBC QN AUTO: 26 PG (ref 27–33)
MCHC RBC AUTO-ENTMCNC: 31.8 G/DL (ref 33.7–35.3)
MCV RBC AUTO: 81.7 FL (ref 81.4–97.8)
MONOCYTES # BLD AUTO: 1.22 K/UL (ref 0–0.85)
MONOCYTES NFR BLD AUTO: 9 % (ref 0–13.4)
NEUTROPHILS # BLD AUTO: 8.87 K/UL (ref 1.82–7.42)
NEUTROPHILS NFR BLD: 65.8 % (ref 44–72)
NRBC # BLD AUTO: 0 K/UL
NRBC BLD-RTO: 0 /100 WBC
PLATELET # BLD AUTO: 377 K/UL (ref 164–446)
PMV BLD AUTO: 9.6 FL (ref 9–12.9)
POTASSIUM SERPL-SCNC: 4.5 MMOL/L (ref 3.6–5.5)
RBC # BLD AUTO: 3.27 M/UL (ref 4.7–6.1)
SODIUM SERPL-SCNC: 125 MMOL/L (ref 135–145)
WBC # BLD AUTO: 13.5 K/UL (ref 4.8–10.8)

## 2020-04-21 PROCEDURE — 700102 HCHG RX REV CODE 250 W/ 637 OVERRIDE(OP): Performed by: INTERNAL MEDICINE

## 2020-04-21 PROCEDURE — A9270 NON-COVERED ITEM OR SERVICE: HCPCS | Performed by: INTERNAL MEDICINE

## 2020-04-21 PROCEDURE — 99239 HOSP IP/OBS DSCHRG MGMT >30: CPT | Performed by: HOSPITALIST

## 2020-04-21 PROCEDURE — 80048 BASIC METABOLIC PNL TOTAL CA: CPT

## 2020-04-21 PROCEDURE — A9270 NON-COVERED ITEM OR SERVICE: HCPCS | Performed by: SURGERY

## 2020-04-21 PROCEDURE — 700102 HCHG RX REV CODE 250 W/ 637 OVERRIDE(OP): Performed by: PHYSICIAN ASSISTANT

## 2020-04-21 PROCEDURE — 85025 COMPLETE CBC W/AUTO DIFF WBC: CPT

## 2020-04-21 PROCEDURE — A9270 NON-COVERED ITEM OR SERVICE: HCPCS | Performed by: PHYSICIAN ASSISTANT

## 2020-04-21 PROCEDURE — 700102 HCHG RX REV CODE 250 W/ 637 OVERRIDE(OP): Performed by: HOSPITALIST

## 2020-04-21 PROCEDURE — 36415 COLL VENOUS BLD VENIPUNCTURE: CPT

## 2020-04-21 PROCEDURE — 82962 GLUCOSE BLOOD TEST: CPT

## 2020-04-21 PROCEDURE — A9270 NON-COVERED ITEM OR SERVICE: HCPCS | Performed by: HOSPITALIST

## 2020-04-21 PROCEDURE — A9270 NON-COVERED ITEM OR SERVICE: HCPCS | Performed by: FAMILY MEDICINE

## 2020-04-21 PROCEDURE — 700102 HCHG RX REV CODE 250 W/ 637 OVERRIDE(OP): Performed by: FAMILY MEDICINE

## 2020-04-21 PROCEDURE — A9270 NON-COVERED ITEM OR SERVICE: HCPCS | Performed by: NURSE PRACTITIONER

## 2020-04-21 PROCEDURE — 700102 HCHG RX REV CODE 250 W/ 637 OVERRIDE(OP): Performed by: SURGERY

## 2020-04-21 PROCEDURE — 700102 HCHG RX REV CODE 250 W/ 637 OVERRIDE(OP): Performed by: NURSE PRACTITIONER

## 2020-04-21 RX ORDER — TAMSULOSIN HYDROCHLORIDE 0.4 MG/1
0.4 CAPSULE ORAL
Status: DISCONTINUED | OUTPATIENT
Start: 2020-04-21 | End: 2020-04-21 | Stop reason: HOSPADM

## 2020-04-21 RX ORDER — TAMSULOSIN HYDROCHLORIDE 0.4 MG/1
0.4 CAPSULE ORAL
Qty: 30 CAP | Refills: 4 | Status: SHIPPED | OUTPATIENT
Start: 2020-04-22

## 2020-04-21 RX ADMIN — ISOSORBIDE MONONITRATE 30 MG: 30 TABLET, EXTENDED RELEASE ORAL at 05:17

## 2020-04-21 RX ADMIN — ASPIRIN AND DIPYRIDAMOLE 1 CAPSULE: 25; 200 CAPSULE, EXTENDED RELEASE ORAL at 05:18

## 2020-04-21 RX ADMIN — OXYCODONE HYDROCHLORIDE 15 MG: 10 TABLET ORAL at 06:29

## 2020-04-21 RX ADMIN — OXYCODONE HYDROCHLORIDE 10 MG: 10 TABLET, FILM COATED, EXTENDED RELEASE ORAL at 05:17

## 2020-04-21 RX ADMIN — OMEPRAZOLE 20 MG: 20 CAPSULE, DELAYED RELEASE ORAL at 05:18

## 2020-04-21 RX ADMIN — SPIRONOLACTONE 50 MG: 50 TABLET ORAL at 05:18

## 2020-04-21 RX ADMIN — FERROUS SULFATE TAB 325 MG (65 MG ELEMENTAL FE) 325 MG: 325 (65 FE) TAB at 09:14

## 2020-04-21 RX ADMIN — LOSARTAN POTASSIUM 50 MG: 50 TABLET, FILM COATED ORAL at 05:18

## 2020-04-21 RX ADMIN — AMLODIPINE BESYLATE 10 MG: 10 TABLET ORAL at 05:18

## 2020-04-21 RX ADMIN — TAMSULOSIN HYDROCHLORIDE 0.4 MG: 0.4 CAPSULE ORAL at 12:28

## 2020-04-21 RX ADMIN — SENNOSIDES AND DOCUSATE SODIUM 2 TABLET: 8.6; 5 TABLET ORAL at 05:18

## 2020-04-21 RX ADMIN — OXYCODONE HYDROCHLORIDE 15 MG: 10 TABLET ORAL at 12:28

## 2020-04-21 RX ADMIN — PREGABALIN 75 MG: 75 CAPSULE ORAL at 12:28

## 2020-04-21 RX ADMIN — ATENOLOL 100 MG: 50 TABLET ORAL at 05:18

## 2020-04-21 RX ADMIN — PREGABALIN 75 MG: 75 CAPSULE ORAL at 05:18

## 2020-04-21 RX ADMIN — CLOPIDOGREL BISULFATE 75 MG: 75 TABLET ORAL at 05:17

## 2020-04-21 RX ADMIN — HYDRALAZINE HYDROCHLORIDE 100 MG: 25 TABLET, FILM COATED ORAL at 05:18

## 2020-04-21 RX ADMIN — Medication 500 MG: at 05:18

## 2020-04-21 RX ADMIN — NYSTATIN: 100000 POWDER TOPICAL at 05:17

## 2020-04-21 ASSESSMENT — ENCOUNTER SYMPTOMS
FEVER: 0
HEADACHES: 0
BLURRED VISION: 0
NAUSEA: 0
CONSTIPATION: 0
MYALGIAS: 1
BACK PAIN: 0
DIARRHEA: 0
CHILLS: 0
VOMITING: 0
SORE THROAT: 0
ABDOMINAL PAIN: 0
DIZZINESS: 0
DOUBLE VISION: 0
SHORTNESS OF BREATH: 0
SENSORY CHANGE: 1
FOCAL WEAKNESS: 1

## 2020-04-21 NOTE — DISCHARGE INSTRUCTIONS
Stump and Prosthesis Care  When an arm or leg is removed, it is important to care for the artificial body part that replaces it (prosthesis) and for the remaining end of the arm or leg (stump). Caring for the stump and prosthesis will help you to be comfortable, active, and healthy.  How to care for your stump  Cleaning Your Skin  · Wash your stump with a mild antibacterial soap at least once per day.  · Wash your stump after getting dirty or sweaty.  · After washing your stump, pat it dry and let it air-dry for 5-10 minutes.  · Do not soak your stump in a warm or hot bath for longer than 20 minutes at a time.  · Avoid shaving hair on the stump. Hair that grows out after being shaved is more easily irritated by the prosthesis.  Using Skin Care Products  · Apply ointment to your surgical scar if your health care provider instructed you to do so. This can keep the scar soft and help it heal.  · Do not put creams and lotions on your stump unless your health care provider says it is okay. If your health care provider says it is okay to put creams and lotions on your stump, do not use lotions that contain petroleum jelly.  · Do not use skin care products with an alcohol base. These products can be harmful to your skin. They can also damage the lining of the prosthesis.  · Consider using an antiperspirant spray on the skin of the stump if you are prone to sweating.  Other Instructions  · Every day, look closely at the skin on your stump. Use a mirror with a long handle to check areas you cannot see, or ask a friend or family member to check those areas. Look for areas that appear reddish, swollen, or irritated. Pay extra attention to places where the stump and prosthesis rub together.  How to care for your prosthesis  Cleaning Your Prosthesis  · Use hot water and antibacterial soap to wash your prosthesis.  Attaching Your Prosthesis  · Make sure your prosthesis is clean before you attach it to your stump. All the parts  that touch your skin should be clean and dry.  · Be sure you understand how to attach the prosthesis. A prosthetic specialist (prosthetist) can show you how to do this. It is a good idea to practice several times while he or she watches.  · If you were given wraps or socks to wear under the prosthesis, make sure to wear them.  Other Instructions  · Exercise and move your prosthesis as recommended by your physical therapist.  · Follow your health care provider's instructions about the length of time you should wear your prosthesis. You will likely need to limit the amount of time you wear your prosthesis at first. You may be instructed to increase the time you wear your prosthesis a little bit each day.  Contact a health care provider if:  · The prosthesis does not seem to fit correctly.  · You have an itchy rash or a sore on your stump.  · Sweating between the stump and the prosthesis is heavy and efforts to control the sweating do not work.  Get help right away if:  · Your stump is red, swollen, painful to the touch, or hot.  · A bad smell develops around the stump.  · There is a sore on your stump that is not healing.  · Your stump is colder than the upper part of the limb.  · Skin on your stump turns gray or black.  · There is any drainage coming from your stump.  This information is not intended to replace advice given to you by your health care provider. Make sure you discuss any questions you have with your health care provider.  Document Released: 03/14/2011 Document Revised: 08/13/2017 Document Reviewed: 12/14/2015  [employer] Interactive Patient Education © 2017 [employer]      Discharge Instructions    Discharged to other by medical transportation with escort. Discharged via wheelchair, hospital escort: Yes.  Special equipment needed: Not Applicable    Be sure to schedule a follow-up appointment with your primary care doctor or any specialists as instructed.     Discharge Plan:   Influenza Vaccine  Indication: Not indicated: Previously immunized this influenza season and > 8 years of age    I understand that a diet low in cholesterol, fat, and sodium is recommended for good health. Unless I have been given specific instructions below for another diet, I accept this instruction as my diet prescription.   Other diet: diabetic, dysphagia 2, nectar thick liquids    Special Instructions: Discharge instructions for the Orthopedic Patient    Follow up with Primary Care Physician within 2 weeks of discharge to home, regarding:  Review of medications and diagnostic testing.  Surveillance for medical complications.  Workup and treatment of osteoporosis, if appropriate.     -Is this a Hip/Knee/Shoulder Joint Replacement patient? No    -Is this patient being discharged with medication to prevent blood clots?  No    · Is patient discharged on Warfarin / Coumadin?   No     Depression / Suicide Risk    As you are discharged from this [facility] facility, it is important to learn how to keep safe from harming yourself.    Recognize the warning signs:  · Abrupt changes in personality, positive or negative- including increase in energy   · Giving away possessions  · Change in eating patterns- significant weight changes-  positive or negative  · Change in sleeping patterns- unable to sleep or sleeping all the time   · Unwillingness or inability to communicate  · Depression  · Unusual sadness, discouragement and loneliness  · Talk of wanting to die  · Neglect of personal appearance   · Rebelliousness- reckless behavior  · Withdrawal from people/activities they love  · Confusion- inability to concentrate     If you or a loved one observes any of these behaviors or has concerns about self-harm, here's what you can do:  · Talk about it- your feelings and reasons for harming yourself  · Remove any means that you might use to hurt yourself (examples: pills, rope, extension cords, firearm)  · Get professional help from the community  (Mental Health, Substance Abuse, psychological counseling)  · Do not be alone:Call your Safe Contact- someone whom you trust who will be there for you.  · Call your local CRISIS HOTLINE 381-0286 or 067-779-3731  · Call your local Children's Mobile Crisis Response Team [place] (855) 350-3627 or www.[employer]  · Call the toll free National Suicide Prevention Hotlines   · National Suicide Prevention Lifeline 414-283-FKVF (7271)  · National Hope Line Network 800-SUICIDE (271-0410)

## 2020-04-21 NOTE — PROGRESS NOTES
Pt was unable to void, bladder scan indicated 814mL in bladder, on call hospitalist was notified by this RN. Orders received to insert indwelling Vaz catheter.

## 2020-04-21 NOTE — DISCHARGE SUMMARY
Discharge Summary    CHIEF COMPLAINT ON ADMISSION  No chief complaint on file.      Reason for Admission  DM ulcers RLE leg and foot     Admission Date  4/9/2020    CODE STATUS  Full Code    HPI & HOSPITAL COURSE  This is a 64 y.o. male prisoner with a past medical history of stroke, diabetes, hypertension, left AKA here with worsening of right diabetic foot ulcers that has failed treatment with antibiotics and wound VAC therapy.  Records indicate a history of wound cultures positive for Pseudomonas and enterococcus, which he was initiated on antibiotic therapy in treatment for.  His wound has worsened despite these measures.    On admission the patient was initiated on IV antibiotics.  He underwent extensive arterial evaluation by the vascular surgery team and ultimately underwent right lower extremity angiogram with SFA angioplasty on 4/13/2020 with findings of severe aortoiliac occlusive disease.  Due to the severity of his arterial disease and low probability of wound healing, the patient underwent right above-knee amputation by Dr. Hairston on 4/15/2020.  The patient tolerated the procedure well.  His antibiotics were discontinued.  He remained in the hospital receiving wound care and pain management.  His diabetes regimen has been adjusted for better glucose control to enhance healing.    At this time the patient's surgical wound appears to be healing appropriately.  There is no evidence of further infection.  He has worked with physical and occupational therapy with no further acute therapy needs.  He is recommended to have Lisa lift on return to shelter for transfers.  His diabetes should continue to be closely managed by the physician at the shelter.  He is currently medically stable and will need to follow-up with orthopedic surgery in 3 weeks for postop follow-up.    This patient on the night of 4/20 developed urinary retention.  A Vaz catheter was was placed that night.  The patient was started on  Flomax on April 21st.  My recommendation is that the catheter remain for the next 5 days and after 5 days a voiding trial can be attempted.  If urinary tension persist patient should follow-up with urology in the outpatient setting      Therefore, he is [facility] in [facility] and stable condition [facility].    The patient met 2-midnight criteria for an inpatient stay at the time of discharge.    Discharge Date  4/20/2020    FOLLOW UP ITEMS POST DISCHARGE  -Continue tight glucose control to attempt to maintain glucose less than 150.  -Recommend Lisa lift for transfers.  -Follow with orthopedic surgery in 3 weeks.    DISCHARGE DIAGNOSES  Principal Problem:    Diabetic foot ulcer (HCC) POA: Yes  Active Problems:    PAD (peripheral artery disease) (HCC) POA: Yes    S/P AKA (above knee amputation), right (HCC) POA: Unknown    DM (diabetes mellitus) (HCC) POA: Yes    HTN (hypertension) POA: Yes    History of stroke POA: Yes    Hyponatremia POA: Yes    Dysphagia as late effect of stroke POA: Yes    Iron deficiency anemia POA: Yes    Urinary retention POA: No  Resolved Problems:    * No resolved hospital problems. *      FOLLOW UP  Follow with physician at prison on [facility].    Fredy Hairston M.D.  [address] [facility] [address]  [phone]    Call in 3 weeks      [address] [address] [phone]  Schedule an appointment as soon as possible for a visit  Please call to establish with a Primary Care Physicain and schedule your hospital follow up. Thank you.      MEDICATIONS ON DISCHARGE     Medication List      START taking these medications      Instructions   acetaminophen 325 MG Tabs  Commonly known as:  TYLENOL   Take 2 Tabs by mouth every 6 hours as needed (Mild Pain; (Pain scale 1-3); Temp greater than 100.5 F).  Dose:  650 mg     ascorbic acid 500 MG tablet  Commonly known as:  VITAMIN C   Take 1 Tab by mouth every day.  Dose:  500 mg     atorvastatin 40 MG  Tabs  Commonly known as:  LIPITOR   Take 1 Tab by mouth every bedtime.  Dose:  40 mg     clopidogrel 75 MG Tabs  Commonly known as:  PLAVIX   Take 1 Tab by mouth every day.  Dose:  75 mg     ferrous sulfate 325 (65 Fe) MG tablet   Take 1 Tab by mouth every morning with breakfast.  Dose:  325 mg     insulin glargine 100 UNIT/ML Soln  Commonly known as:  LANTUS   Inject 40 Units as instructed every evening.  Dose:  40 Units     insulin lispro 100 UNIT/ML  Commonly known as:  HUMALOG   Inject 10 Units as instructed 3 times a day before meals.  Dose:  10 Units     pregabalin 75 MG Caps  Commonly known as:  LYRICA   Take 1 Cap by mouth 3 times a day for 30 days.  Dose:  75 mg        CHANGE how you take these medications      Instructions   * oxycodone 15 MG immediate release tablet  What changed:  Another medication with the same name was added. Make sure you understand how and when to take each.  Commonly known as:  OXY-IR   Take 15 mg by mouth every four hours as needed for Severe Pain.  Dose:  15 mg     * oxyCODONE CR 10 MG T12a  What changed:  You were already taking a medication with the same name, and this prescription was added. Make sure you understand how and when to take each.  Commonly known as:  OXYCONTIN   Take 1 Tab by mouth every 12 hours for 3 days.  Dose:  10 mg         * This list has 2 medication(s) that are the same as other medications prescribed for you. Read the directions carefully, and ask your doctor or other care provider to review them with you.            CONTINUE taking these medications      Instructions   amitriptyline 50 MG Tabs  Commonly known as:  ELAVIL   Take 50 mg by mouth every evening.  Dose:  50 mg     amLODIPine 5 MG Tabs  Commonly known as:  NORVASC   Take 10 mg by mouth every day.  Dose:  10 mg     aspirin-dipyridamole  MG Cp12  Commonly known as:  AGGRENOX   Take 1 Cap by mouth 2 times a day.  Dose:  1 Cap     atenolol 100 MG Tabs  Commonly known as:  TENORMIN   Take 100  mg by mouth every day.  Dose:  100 mg     glipiZIDE 10 MG Tabs  Commonly known as:  GLUCOTROL   Take 10 mg by mouth 2 times a day.  Dose:  10 mg     hydrALAZINE 50 MG Tabs  Commonly known as:  APRESOLINE   Take 100 mg by mouth 2 Times a Day.  Dose:  100 mg     insulin regular 100 Unit/mL Soln  Commonly known as:  HUMULIN R   Inject 2-10 Units as instructed 2 times a day as needed for High Blood Sugar.  Dose:  2-10 Units     isosorbide mononitrate SR 30 MG Tb24  Commonly known as:  IMDUR   Take 30 mg by mouth 2 Times a Day.  Dose:  30 mg     losartan 50 MG Tabs  Commonly known as:  COZAAR   Take 50 mg by mouth every day.  Dose:  50 mg     Nuedexta 20-10 MG Caps  Generic drug:  Dextromethorphan-quiNIDine   Take 1 Tab by mouth 2 Times a Day.  Dose:  1 Tab     omeprazole 20 MG tablet  Commonly known as:  PRILOSEC OTC   Take 20 mg by mouth every day.  Dose:  20 mg     spironolactone 50 MG Tabs  Commonly known as:  ALDACTONE   Take 50 mg by mouth 2 times a day.  Dose:  50 mg        STOP taking these medications    insulin  UNIT/ML Susp  Commonly known as:  HUMULIN/NOVOLIN     lovastatin 40 MG tablet  Commonly known as:  MEVACOR            Allergies  No Known Allergies    DIET  Orders Placed This Encounter   Procedures   • Diet Order Diabetic     Standing Status:   Standing     Number of Occurrences:   1     Order Specific Question:   Diet:     Answer:   Diabetic [3]     Order Specific Question:   Calorie modifications:     Answer:   2000 kcals [5]     Order Specific Question:   Texture Modifier     Answer:   Level 5 - Minced & Moist (Dysphagia 2)     Order Specific Question:   Liquid level     Answer:   Level 2 - Mildly Thick     Order Specific Question:   Consistency/Fluid modifications:     Answer:   1000 ml Fluid Restriction [7]       ACTIVITY  As tolerated.  Weight bearing as tolerated    CONSULTATIONS  Orthopedic Surgery - Dr. Hairston  Vascular Surgery - Dr. Lebron.    PROCEDURES  4/13/2020:  Right lower  extremity angiogram with SFA angioplasty    4/15/2020:   Right above-knee amputation    LABORATORY  Lab Results   Component Value Date    SODIUM 125 (L) 04/21/2020    POTASSIUM 4.5 04/21/2020    CHLORIDE 90 (L) 04/21/2020    CO2 23 04/21/2020    GLUCOSE 100 (H) 04/21/2020    BUN 16 04/21/2020    CREATININE 0.52 04/21/2020        Lab Results   Component Value Date    WBC 13.5 (H) 04/21/2020    HEMOGLOBIN 8.5 (L) 04/21/2020    HEMATOCRIT 26.7 (L) 04/21/2020    PLATELETCT 377 04/21/2020        Total time of the discharge process was 35 minutes.

## 2020-04-21 NOTE — PROGRESS NOTES
Hospital Medicine Daily Progress Note    Date of Service  4/21/2020    Chief Complaint  64 y.o. male admitted 4/9/2020 with Leg wound       Hospital Course    64 y.o. male who presented 4/9/2020 with stroke, DM, HTN who [facility] with right diabetic foot ulcers that have been not healing despite antibiotics and wound Vac.  His documents show that his wound culture was growing Pseudomonas and enterococcus and he was treated with oral antibiotics.  On admission Dr. Lebron was consulted.  He underwent RLE angiogram on 4/13 showing aortoiliac occlusive disease.  He is pending AKA vs BKA.  Ortho has been consulted.  Continue IV antibiotics and tight glycemic control.    Interval Problem Update    4/18:  Dressing changed at bedside.  Small amount of bloody drainage noted.  No evidence of infection.  Will change dressing in 2 days to ensure further improvement of drainage.  Continues to be aggressive with pain medication.  Attempt conservative pain management as tolerated.  Denies fever, chills, or shortness of breath.  4/19:  Pain to surgical site uncontrolled. Start scheduled oxycontin.  No drainage noted to surgical dressing.  Sodium improved with IVF.  VSS.   4/20:  Dressing changed today.  Very minimal serosanguinous drainage from wound.  No evidence of infection.  Sutures intact.  Pain better controlled on current regimen.  Glucose continues to be uncontrolled.  Insulin adjusted.     4/21 new urinary retention last night    Vaz placed last night    Low sodium of 125     elevatedd wbc of 13.5    Low hb at 8.5    Afebrile    Right stump pain, intensity , 4/10 dull ache    Consultants/Specialty  Vascular surgery  Ortho surgery.     Code Status  full    Disposition  Anticipate transfer back to halfway with jose r lift when medically clear.      Review of Systems  Review of Systems   Constitutional: Negative for chills, fever and malaise/fatigue.   HENT: Negative for congestion and sore  throat.    Eyes: Negative for blurred vision and double vision.   Respiratory: Negative for shortness of breath.    Cardiovascular: Negative for chest pain and leg swelling.   Gastrointestinal: Negative for abdominal pain, constipation, diarrhea, nausea and vomiting.   Genitourinary: Negative for dysuria and hematuria.   Musculoskeletal: Positive for joint pain and myalgias. Negative for back pain.   Skin: Negative for itching and rash.   Neurological: Positive for sensory change (Phantom limb pain) and focal weakness (chronic left sided weakness secondary to h/o cva. ). Negative for dizziness and headaches.        Physical Exam  Temp:  [36.1 °C (97 °F)-37.7 °C (99.9 °F)] 36.5 °C (97.7 °F)  Pulse:  [70-77] 70  Resp:  [18-19] 19  BP: (102-127)/(52-70) 102/52  SpO2:  [93 %-97 %] 97 %    Physical Exam  Constitutional:       General: He is not in acute distress.     Appearance: He is not ill-appearing.      Comments: Chronically-ill appearing male.    HENT:      Head: Normocephalic and atraumatic.      Nose: Nose normal. No congestion.      Mouth/Throat:      Mouth: Mucous membranes are dry.      Pharynx: Oropharynx is clear. No oropharyngeal exudate.   Eyes:      General:         Right eye: No discharge.         Left eye: No discharge.      Conjunctiva/sclera: Conjunctivae normal.   Neck:      Musculoskeletal: Normal range of motion and neck supple. No neck rigidity or muscular tenderness.   Cardiovascular:      Rate and Rhythm: Normal rate and regular rhythm.      Heart sounds: Normal heart sounds. No murmur.   Pulmonary:      Effort: Pulmonary effort is normal. No respiratory distress.      Breath sounds: Normal breath sounds. No wheezing or rales.   Abdominal:      General: There is no distension.      Palpations: Abdomen is soft.      Tenderness: There is no abdominal tenderness.   Genitourinary:     Comments: reid  Musculoskeletal: Normal range of motion.         General: Tenderness, deformity and signs of injury  present. No swelling.      Comments: Eschar and ulcers are noted on the  Lateral side of the right foot  Left AKA.  S/P right AKA on 4/15.  Surgical dressing in place.    Skin:     General: Skin is warm and dry.      Coloration: Skin is pale. Skin is not jaundiced.   Neurological:      Mental Status: He is alert and oriented to person, place, and time. Mental status is at baseline.      Sensory: Sensory deficit present.      Comments: Chronic left hemiparesis and dysphagia secondary to history of CVA.    Psychiatric:         Mood and Affect: Mood normal.         Fluids    Intake/Output Summary (Last 24 hours) at 4/21/2020 1030  Last data filed at 4/21/2020 0800  Gross per 24 hour   Intake 650 ml   Output 1125 ml   Net -475 ml       Laboratory  Recent Labs     04/19/20  0530 04/20/20  0021 04/21/20  0607   WBC 11.0* 12.3* 13.5*   RBC 3.04* 3.04* 3.27*   HEMOGLOBIN 8.0* 8.0* 8.5*   HEMATOCRIT 24.6* 24.3* 26.7*   MCV 80.9* 79.9* 81.7   MCH 26.3* 26.3* 26.0*   MCHC 32.5* 32.9* 31.8*   RDW 43.9 43.2 46.8   PLATELETCT 302 353 377   MPV 9.8 9.6 9.6     Recent Labs     04/19/20  0530 04/21/20  0607   SODIUM 128* 125*   POTASSIUM 4.2 4.5   CHLORIDE 92* 90*   CO2 24 23   GLUCOSE 260* 100*   BUN 9 16   CREATININE 0.42* 0.52   CALCIUM 9.0 9.1                   Imaging       Assessment/Plan  * Diabetic foot ulcer (HCC)- (present on admission)  Assessment & Plan  Multiple wounds of RLE  Not healing despite oral antibiotics and wound VAC  Wound culture with pseudomonas and enterococcus   Vascular surgery following  S/P RLE angiogram on 4/13 showing aortoiliac occlusive disease  S/P right AKA on 4/15  Pathology findings consistent with OM  Discontinue zosyn.  Ortho surgery and LPS following.   Wound care.   Continue oxycontin and neurontin   Continue pain control IV and oral narcotics.    PT/OT following    S/P AKA (above knee amputation), right (HCC)  Assessment & Plan  Secondary to OM  PT/OT following  Will need jose r lift at  discharge.   Continue pain control     PAD (peripheral artery disease) ([facility])- (present on admission)  Assessment & Plan  S/P RLE angiogram showing PAD and aortoiliac occlusive disease  Vascular surgery and ortho following.   Right AKA 4/15.   Continue lipitor, aggrenox, and plavix.     Dysphagia as late effect of stroke- (present on admission)  Assessment & Plan  History of  SLP following  Continue modified diet.     Hyponatremia- (present on admission)  Assessment & Plan  Appears chronic on review of records  High urine sodium with low serum osmo  Improved with IVF  Follow bmp.     History of stroke- (present on admission)  Assessment & Plan  With residual left-sided weakness and dysphasia  Aggrenox, plavix, and lipitor.     HTN (hypertension)- (present on admission)  Assessment & Plan  Will monitor  continue on Norvasc, hydralazine, Imdur, atenolol, losartan    DM (diabetes mellitus) ([facility])- (present on admission)  Assessment & Plan  hema A1c is 7.1  Glucose poorly controlled.  Increase lantus to 40 units every evening  t prandial insulin  SSI  Attempt to maintain BG < 150 to promote healing.   accu checks are noted  Diabetic diet    Urinary retention  Assessment & Plan  Vaz in place    Started flomax on 4/21    Recommend voiding trial in 5 to 7 days    Iron deficiency anemia- (present on admission)  Assessment & Plan  No evidence of active bleed  Iron 28  Continue ferrous sulfate and ascorbic acid.   Aspect of blood loss anemia secondary to surgery  Hgb stable.   Continue to follow cbc  Transfuse for hgb < 7.          Check am cbc, bmp  VTE prophylaxis:  Bilateral AKAs.

## 2020-04-21 NOTE — PROGRESS NOTES
RN reviewed discharge instructions and medications with patient at bedside. All questions addressed. Copy of AVS sent with patient for alf healthcare staff.    Patient discharged at 1245 via wheelchair with guards. Vaz remains in place at discharge.

## 2020-04-21 NOTE — DISCHARGE PLANNING
This GIACOMO HAMMOND informed GIACOMO Philip to wait for the updated Discharge Summary before discharging Pt to the skilled nursing.    12:34 Updated Discharge Summary endorsed to GIACOMO Philip.

## 2020-04-21 NOTE — DISCHARGE PLANNING
This GIACOMO HAMMOND spoke with GIACOMO Ramos of the [facility] at Tel [phone]. Per Rachel, Pt has been cleared to go back to the retirement and to send a copy of Pt's Discharge Summary and LPS notes with the Pt.   Informed GIACOMO Philip that the snf Guards can call the [facility] and that Pt will transport using the retirement's [facility]. Copy of Discharge Summary and LPS notes endorsed to GIACOMO Philip.

## 2020-05-11 NOTE — CONSULTS
DATE OF SERVICE:  04/14/2020    HISTORY OF PRESENT ILLNESS:  This is a 64-year-old gentleman who presented   with a left foot wound positive for Pseudomonas, Enterococcus, who now has a   necrotic foot and I was consulted for amputation.      PAST MEDICAL HISTORY:  Significant for CVA, diabetes, dysphagia, hypertension.        PAST SURGICAL HISTORY:  Vitrectomy, left AKA.      MEDICATIONS:  Nuedexta, [place], Norvasc, Tenormin, Aggrenox, hydralazine,   Cozaar, Imdur, Prilosec.      ALLERGIES:  No known drug allergies.      SOCIAL HISTORY:  He does not smoke or use IV drugs.      FAMILY HISTORY:  Negative for hypertension, diabetes.      REVIEW OF SYSTEMS:  Positive for right necrotic foot.  All other systems   reviewed and found to be negative.      PHYSICAL EXAMINATION:    VITAL SIGNS:  Temperature is 96, pulse 76, respirations 16.    GENERAL:  This is a well-developed, well-nourished male in no acute distress.      MENTAL STATUS:  The patient is alert and oriented x3.  GCS of 15.    HEAD:  Normocephalic, atraumatic.    EYES:  Anicteric.  Pupils equal, round, reactive to light and accommodation.    OROPHARYNX:  Clear.  Mucosa moist.    NECK:  Supple, nontender, no masses.    PULMONARY:  The patient is breathing spontaneously, it is unlabored.    LUNGS:  Clear to auscultation.    HEART:  Regular rhythm.  No murmurs, rubs or gallops.    ABDOMEN:  Soft, nontender, nondistended.    PELVIS:  Stable to AP and lateral compression.    MUSCULOSKELETAL:  Examination of the right leg demonstrated necrotic right   foot.      ASSESSMENT:  Necrotic, right foot and leg.      PLAN:  At this point, he has been evaluated by a vascular and is not a   candidate for below-knee amputation.  He needs to proceed with above-knee   amputation and already has on his left side and he is agreeable to this.    Risks and benefits were discussed, which include but not limited to bleeding,   infection, neurovascular damage, pain, stiffness, and need  for further surgery   including DVT, MI, PE, stroke, and death.  He understands all these risks and   wishes to proceed.       ____________________________________     SIMRAN ISABEL MD PLA / SUNNY    DD:  05/11/2020 09:04:34  DT:  05/11/2020 10:27:50    D#:  [id number]  Job#:  [id number]

## 2021-10-06 NOTE — THERAPY
"Occupational Therapy Evaluation completed.   Functional Status: Pt is a 63 y/o inmate admitted with R leg ulcer, now s/p R AKA. He has a hx of stroke w/ residual L deficits, also with L AKA. He was willing to participate to a degree. ModA Bed mobility. Unable to scoot EOB due to LUE deficits at baseline. Reports that two friends were lifting him into his w/c at alf. TotalA LB cares. Setup grooming seated. Pt reported that he wanted to go on palliative care when backin alf and use the lisa lift that's available.   Plan of Care:Patient will not be actively followed for occupational therapy services at this time, however may be seen if requested by physician for 1 more visit within 30 days to address any discharge or equipment needs.     Discharge Recommendations:  Equipment: W/c, Lisa. Likely back to [facility]    See \"Rehab Therapy-Acute\" Patient Summary Report for complete documentation.    "
"Pt w/ hx of left AKA.  He is now also right AKA.  He reports that two \"friends lift him into his chair\".  [facility]  He does need assist to sit edge of bed, but is able to maintain once assisted.  Recommend eob sitting for meals.  Attempted to \"scoot\" along the side of the bed, however pt w/ left sided deficits from a prior cva, and is unable to assist w/ his LUE.  He also makes it very clear that he is not interested in therapy, wishing to d/c back to jail on palliative care.  Both pt and guards report that the jail has a jose r lift and will assist to pt into his chair and to the toilet.  I would strongly recommend that the jose r be the means of getting him out of bed, for both his safety and the staff.  I do not anticipate that he will move beyond dependant transfers due to his co morbidities.  PT will follow for d/c needs only at this time.     Physical Therapy Evaluation completed.   Bed Mobility:   Mod assist  Transfers:  LISSETTE, recommend jose r  Gait: Level Of Assist: Unable to Participate         Plan of Care: Patient with no further skilled PT needs in the acute care setting at this time  Discharge Recommendations: Equipment: Will Continue to Assess for Equipment Needs. Post-acute therapy  D/c back to jail w/ jose r lift for transfers  See \"Rehab Therapy-Acute\" Patient Summary Report for complete documentation.     "
"Speech Language Therapy Clinical Swallow Evaluation completed.  Functional Status: Pt was A&Ox2, not to date. Oral mech exam was WFL. Pt has only several mandibular frontal teeth. He states he has dentures but doesn't wear them. Vocal quality was strained, hoarse with transient ?diplophonia at times. Pt reports this is new since this morning. He was also noted to wheeze on exhalation with exertion. RN informed. Pt was presented with ice chips (5), mildly thick liquids via tsp/cup (MT2), thins via tsp/cup (TN0), Liquidised Food (LQ3), Pureed Solids (PU4), Soft & Bite Sized Solids (SB6). Oral phase was c/b reduced mastication of soft solids with vertical mashing pattern noted. Pocketing on L noted x1 with soft peaches, but otherwise, pt was able to clear the oral cavity after the first swallow. Pharyngeal swallow response was generally timely. Hyolaryngeal excursion was palpated as complete but sluggish. Overt coughing occurred with cup sip of thin liquids with pt reporting \"that went down the wrong pipe.\" No other s/sx of aspiration noted. Recommend initiating a diet of Minced and Moist Solids (MM5) and Mildly Thick Liquids (MT2) with monitoring during meals. Please assist w/ tray set up and hold PO with any overt s/sx of aspiration and/or change in status.     Recommendations - Diet: Minced & Moist (5) - (Dysphagia II), Mildly Thick (2) - (Nectar Thick)                          Strategies: Monitor during meals, Assistance needed for meal tray set-up, No Straws and Head of Bed at 90 Degrees                          Medication Administration:  Crush all Medications in Puree  Plan of Care: Will benefit from Speech Therapy 3 times per week  Post-Acute Therapy: Recommend inpatient transitional care services for continued speech therapy services.        See \"Rehab Therapy-Acute\" Patient Summary Report for complete documentation.   "
"Speech Language Therapy dysphagia treatment completed.   Functional Status:  Ongoing assessment of oropharyngeal swallow skills and safe swallow strategy training conducted during breakfast meal of Minced and Moist Solids (MM5) and Mildly Thick Liquids (MT2). Of note, pt had thin liquids at bedside, which were promptly removed by SLP. Pt/guards/staff educated on need for thickened liquids to minimize risk of aspiration. Pt presented with poor PO intake at breakfast, consuming only ~10% of meal and 3 oz of mildly thick liquids with no s/sx of aspiration. Min verbal cues provided for pt to improve positioning for eating to be more upright. He independently took small bites and sips. Pt was shown laryngeal elevation, pharyngeal squeeze and BoT exercises to improve swallow function, which he was able to return demonstrate x4 each. Pt was instructed to complete exercises 3x/day x10-15 reps each after meals. Recommend continuing current diet of Minced and Moist Solids (MM5) and Mildly Thick Liquids (MT2) with monitoring during meals.     Recommendations: Minced and Moist Solids (MM5) and Mildly Thick Liquids (MT2) with monitoring during meals.   Plan of Care: Will benefit from Speech Therapy 3 times per week  Post-Acute Therapy: Recommend inpatient transitional care services for continued speech therapy services.        See \"Rehab Therapy-Acute\" Patient Summary Report for complete documentation.     "
"Speech Language Therapy dysphagia treatment completed.   Functional Status:  Patient was seen for dysphagia tx this date. Patient positioned upright in bed, guards present. Patient awake and alert, eager for PO trials of thin liquids. PO trials administered consisted of x2 tsp of thin liquids, x2 tsp of thins via cup controlled side of cup, x2 cup sips of mildly thick liquids, x3 tsp of puree. Patient demonstrates ongoing dysarthria, with reduced speech intelligibility, decreased ROM and strength in overall OME. Pt with delayed onset of swallow, audible gulping, decreased and delayed laryngeal elevation and hyolaryngeal excursion noted upon palpation. Patient with immediate and delayed coughing s/p trials of all thin liquids despite strategies. Patient coughing x1 turning red, indicative of likely penetration/aspiration during thin liquid trials. Patient tolerated PO trials of mildly thick liquids and purees with no clinical s/sx of aspiration. At this time given overt coughing with thin liquids recommend continue current diet of MM5/MT2 1:1A/suprevision for all meals. HOLD with any changes or difficulties    Recommendations: Continue current diet of MM5/MT2 assist for set up/supervision during meals. Float meds in puree.   Plan of Care: Will benefit from Speech Therapy 3 times per week  Post-Acute Therapy: Discharge to a transitional care facility for continued skilled therapy services.    See \"Rehab Therapy-Acute\" Patient Summary Report for complete documentation.     "
"Speech Language Therapy dysphagia treatment completed.   Functional Status:  Pt was seen for dysphagia tx this afternoon. Per RN, pt has been asking for straws. Of note, pt was placed on a regular texture diabetic diet yesterday after surgery, despite being on a Minced and Moist/Mildly Thick liquid diet after SLP eval on 4/13. Pt continues to present with oropharyngeal dysphagia and needs a modified diet. Tx trials of mildly thick liquids via straw, thins via tsp/cup/straw provided today. Pt demo'd delayed throat clearing x2 and cough x1 with thins, concerning for penetration/aspiration. No s/sx of aspiration occurred with mildly thick liquids via straw. He independently took small sips and went slowly. Pt was able to return demonstrate 2/4 swallowing exercises taught during prior session. Given min-mod verbal cues, pt was able to execute alternating lingual sweeps, lingual presses, pharyngeal squeeze and laryngeal elevation exercises x5 each. Recommend downgrading diet back to Minced and Moist Solids (MM5)/Mildly Thick Liquids (MT2) with tray set up and monitoring during meals; ok for sips of thin water only between meals; ok to float meds in puree; straws ok.     Recommendations: Minced and Moist Solids (MM5)/Mildly Thick Liquids (MT2) with tray set up and monitoring during meals; ok for sips of thin water only between meals; ok to float meds in puree; straws ok  Plan of Care: Will benefit from Speech Therapy 3 times per week  Post-Acute Therapy: Recommend inpatient transitional care services for continued speech therapy services.        See \"Rehab Therapy-Acute\" Patient Summary Report for complete documentation.     "
Speech Therapy Contact Note    Orders for clinical swallow evaluation received and acknowledged. Spoke to RN who reports pt is not able to sit upright until 2:30pm. SLP will round back later this afternoon.   
98

## 2023-01-13 NOTE — CARE PLAN
Problem: Communication  Goal: The ability to communicate needs accurately and effectively will improve  Outcome: PROGRESSING AS EXPECTED     Problem: Safety  Goal: Will remain free from injury  Outcome: PROGRESSING AS EXPECTED      Cigarettes

## (undated) DEVICE — GLOVE BIOGEL SZ 7.5 SURGICAL PF LTX - (50PR/BX 4BX/CA)

## (undated) DEVICE — PROTECTOR ULNA NERVE - (36PR/CA)

## (undated) DEVICE — SENSOR SPO2 NEO LNCS ADHESIVE (20/BX) SEE USER NOTES

## (undated) DEVICE — CLIP MED LG INTNL HRZN TI ESCP - (20/BX)

## (undated) DEVICE — SUTURE GENERAL

## (undated) DEVICE — HEAD HOLDER JUNIOR/ADULT

## (undated) DEVICE — LACTATED RINGERS INJ 1000 ML - (14EA/CA 60CA/PF)

## (undated) DEVICE — SPONGE GAUZESTER 4 X 4 4PLY - (128PK/CA)

## (undated) DEVICE — DRESSING PETROLEUM GAUZE 5 X 9" (50EA/BX 4BX/CA)"

## (undated) DEVICE — ELECTRODE DUAL RETURN W/ CORD - (50/PK)

## (undated) DEVICE — GLOVE BIOGEL INDICATOR SZ 8 SURGICAL PF LTX - (50/BX 4BX/CA)

## (undated) DEVICE — BANDAGE ELASTIC 6 HONEYCOMB - 6X5YD LF (20/CA)"

## (undated) DEVICE — KIT ANESTHESIA W/CIRCUIT & 3/LT BAG W/FILTER (20EA/CA)

## (undated) DEVICE — KIT ROOM DECONTAMINATION

## (undated) DEVICE — BANDAGE ELASTIC 4 HONEYCOMB - 4"X5YD LF (20/CA)"

## (undated) DEVICE — SET LEADWIRE 5 LEAD BEDSIDE DISPOSABLE ECG (1SET OF 5/EA)

## (undated) DEVICE — SUCTION INSTRUMENT YANKAUER BULBOUS TIP W/O VENT (50EA/CA)

## (undated) DEVICE — GOWN WARMING STANDARD FLEX - (30/CA)

## (undated) DEVICE — SUTURE 2-0 COATED VICRYL PLUS - 12 X 18 INCH (12/BX)

## (undated) DEVICE — CHLORAPREP 26 ML APPLICATOR - ORANGE TINT(25/CA)

## (undated) DEVICE — SODIUM CHL IRRIGATION 0.9% 1000ML (12EA/CA)

## (undated) DEVICE — TOWELS CLOTH SURGICAL - (4/PK 20PK/CA)

## (undated) DEVICE — GLOVE BIOGEL PI INDICATOR SZ 7.0 SURGICAL PF LF - (50/BX 4BX/CA)

## (undated) DEVICE — GLOVE BIOGEL SZ 6.5 SURGICAL PF LTX (50PR/BX 4BX/CA)

## (undated) DEVICE — CANISTER SUCTION 3000ML MECHANICAL FILTER AUTO SHUTOFF MEDI-VAC NONSTERILE LF DISP  (40EA/CA)

## (undated) DEVICE — SUTURE 3-0 ETHILON FS-1 - (36/BX) 30 INCH

## (undated) DEVICE — PACK LOWER EXTREMITY - (2/CA)

## (undated) DEVICE — DRAPE IOBAN II 23 IN X 33 IN - (10/BX)

## (undated) DEVICE — TUBE E-T HI-LO CUFF 8.0MM (10EA/PK)

## (undated) DEVICE — GLOVE BIOGEL PI ORTHO SZ 7 PF LF (40PR/BX)

## (undated) DEVICE — SUTURE 0 VICRYL PLUS CT-1 - 8 X 18 INCH (12/BX)

## (undated) DEVICE — DRAPE IOBAN II INCISE 23X17 - (10EA/BX 4BX/CA)

## (undated) DEVICE — SUTURE 3-0 VICRYL PLUS - 12 X 18 INCH (12/BX)

## (undated) DEVICE — SLEEVE, VASO, THIGH, MED

## (undated) DEVICE — BLADE SAGITTAL SAW DUAL CUT 75.0 X 25.0MM (1/EA)

## (undated) DEVICE — PAD LAP STERILE 18 X 18 - (5/PK 40PK/CA)

## (undated) DEVICE — BLADE SAGITTAL SAW DUAL CUT 25.0 X 90.0 X 1.27MM (1/EA)

## (undated) DEVICE — ELECTRODE 850 FOAM ADHESIVE - HYDROGEL RADIOTRNSPRNT (50/PK)

## (undated) DEVICE — GLOVE BIOGEL SZ 6 PF LATEX - (50EA/BX 4BX/CA)

## (undated) DEVICE — SUTURE 2-0 VICRYL PLUS CT-1 - 8 X 18 INCH(12/BX)

## (undated) DEVICE — PADDING CAST 6 IN STERILE - 6 X 4 YDS (24/CA)

## (undated) DEVICE — NEPTUNE 4 PORT MANIFOLD - (20/PK)

## (undated) DEVICE — TUBING CLEARLINK DUO-VENT - C-FLO (48EA/CA)

## (undated) DEVICE — GLOVE BIOGEL SZ 8 SURGICAL PF LTX - (50PR/BX 4BX/CA)

## (undated) DEVICE — BANDAGE ELASTIC 6 IN X 5 YDS - LATEX FREE (10/BX)

## (undated) DEVICE — TOURNIQUET CUFF 34 X 4 ONE PORT DISP - STERILE (10/BX)

## (undated) DEVICE — STAPLER SKIN DISP - (6/BX 10BX/CA) VISISTAT

## (undated) DEVICE — BLADE SURGICAL #10 - (50/BX)

## (undated) DEVICE — SUTURE 2-0 VICRYL PLUS SH - 8 X 18 INCH (12/BX)

## (undated) DEVICE — MASK ANESTHESIA ADULT  - (100/CA)

## (undated) DEVICE — SET EXTENSION WITH 2 PORTS (48EA/CA) ***PART #2C8610 IS A SUBSTITUTE*****

## (undated) DEVICE — CLIP LG INTNL HRZN TI ESCP LGT - (20/BX)